# Patient Record
Sex: FEMALE | Race: WHITE | NOT HISPANIC OR LATINO | Employment: OTHER | URBAN - METROPOLITAN AREA
[De-identification: names, ages, dates, MRNs, and addresses within clinical notes are randomized per-mention and may not be internally consistent; named-entity substitution may affect disease eponyms.]

---

## 2019-03-14 ENCOUNTER — RECORDS - HEALTHEAST (OUTPATIENT)
Dept: LAB | Facility: CLINIC | Age: 84
End: 2019-03-14

## 2019-03-14 LAB
ALBUMIN UR-MCNC: ABNORMAL MG/DL
APPEARANCE UR: ABNORMAL
BACTERIA #/AREA URNS HPF: ABNORMAL HPF
BILIRUB UR QL STRIP: NEGATIVE
COLOR UR AUTO: ABNORMAL
GLUCOSE UR STRIP-MCNC: NEGATIVE MG/DL
HGB UR QL STRIP: ABNORMAL
KETONES UR STRIP-MCNC: NEGATIVE MG/DL
LEUKOCYTE ESTERASE UR QL STRIP: ABNORMAL
NITRATE UR QL: NEGATIVE
PH UR STRIP: 5.5 [PH] (ref 4.5–8)
RBC #/AREA URNS AUTO: >100 HPF
SP GR UR STRIP: 1.03 (ref 1–1.03)
SQUAMOUS #/AREA URNS AUTO: ABNORMAL LPF
TRANS CELLS #/AREA URNS HPF: ABNORMAL LPF
UROBILINOGEN UR STRIP-ACNC: ABNORMAL
WBC #/AREA URNS AUTO: >100 HPF
WBC CLUMPS #/AREA URNS HPF: PRESENT /[HPF]

## 2019-03-15 LAB — BACTERIA SPEC CULT: NO GROWTH

## 2019-10-03 ENCOUNTER — RECORDS - HEALTHEAST (OUTPATIENT)
Dept: LAB | Facility: CLINIC | Age: 84
End: 2019-10-03

## 2019-10-03 LAB
ANION GAP SERPL CALCULATED.3IONS-SCNC: 9 MMOL/L (ref 5–18)
BUN SERPL-MCNC: 17 MG/DL (ref 8–28)
CALCIUM SERPL-MCNC: 10.3 MG/DL (ref 8.5–10.5)
CHLORIDE BLD-SCNC: 101 MMOL/L (ref 98–107)
CO2 SERPL-SCNC: 25 MMOL/L (ref 22–31)
CREAT SERPL-MCNC: 0.97 MG/DL (ref 0.6–1.1)
ERYTHROCYTE [DISTWIDTH] IN BLOOD BY AUTOMATED COUNT: 16.2 % (ref 11–14.5)
GFR SERPL CREATININE-BSD FRML MDRD: 55 ML/MIN/1.73M2
GLUCOSE BLD-MCNC: 86 MG/DL (ref 70–125)
HCT VFR BLD AUTO: 39.3 % (ref 35–47)
HGB BLD-MCNC: 12.3 G/DL (ref 12–16)
MCH RBC QN AUTO: 27.2 PG (ref 27–34)
MCHC RBC AUTO-ENTMCNC: 31.3 G/DL (ref 32–36)
MCV RBC AUTO: 87 FL (ref 80–100)
PLATELET # BLD AUTO: 219 THOU/UL (ref 140–440)
PMV BLD AUTO: 11.7 FL (ref 8.5–12.5)
POTASSIUM BLD-SCNC: 4.6 MMOL/L (ref 3.5–5)
RBC # BLD AUTO: 4.53 MILL/UL (ref 3.8–5.4)
SODIUM SERPL-SCNC: 135 MMOL/L (ref 136–145)
WBC: 8.9 THOU/UL (ref 4–11)

## 2019-11-26 ENCOUNTER — RECORDS - HEALTHEAST (OUTPATIENT)
Dept: LAB | Facility: CLINIC | Age: 84
End: 2019-11-26

## 2019-11-26 LAB
ALBUMIN UR-MCNC: ABNORMAL MG/DL
AMORPH CRY #/AREA URNS HPF: ABNORMAL /[HPF]
APPEARANCE UR: ABNORMAL
BACTERIA #/AREA URNS HPF: ABNORMAL HPF
BILIRUB UR QL STRIP: NEGATIVE
COLOR UR AUTO: YELLOW
GLUCOSE UR STRIP-MCNC: NEGATIVE MG/DL
HGB UR QL STRIP: ABNORMAL
KETONES UR STRIP-MCNC: NEGATIVE MG/DL
LEUKOCYTE ESTERASE UR QL STRIP: ABNORMAL
NITRATE UR QL: NEGATIVE
PH UR STRIP: 6 [PH] (ref 4.5–8)
RBC #/AREA URNS AUTO: ABNORMAL HPF
SP GR UR STRIP: 1.01 (ref 1–1.03)
SQUAMOUS #/AREA URNS AUTO: ABNORMAL LPF
UROBILINOGEN UR STRIP-ACNC: ABNORMAL
WBC #/AREA URNS AUTO: >100 HPF
WBC CLUMPS #/AREA URNS HPF: PRESENT /[HPF]

## 2019-11-28 LAB — BACTERIA SPEC CULT: ABNORMAL

## 2020-12-23 ENCOUNTER — RECORDS - HEALTHEAST (OUTPATIENT)
Dept: LAB | Facility: CLINIC | Age: 85
End: 2020-12-23

## 2020-12-23 LAB
SARS-COV-2 PCR COMMENT: NORMAL
SARS-COV-2 RNA SPEC QL NAA+PROBE: NEGATIVE
SARS-COV-2 VIRUS SPECIMEN SOURCE: NORMAL

## 2021-09-03 ENCOUNTER — LAB REQUISITION (OUTPATIENT)
Dept: LAB | Facility: CLINIC | Age: 86
End: 2021-09-03
Payer: MEDICARE

## 2021-09-03 DIAGNOSIS — R76.12 NONSPECIFIC REACTION TO CELL MEDIATED IMMUNITY MEASUREMENT OF GAMMA INTERFERON ANTIGEN RESPONSE WITHOUT ACTIVE TUBERCULOSIS: ICD-10-CM

## 2021-09-04 PROCEDURE — P9603 ONE-WAY ALLOW PRORATED MILES: HCPCS | Mod: ORL | Performed by: NURSE PRACTITIONER

## 2021-09-07 ENCOUNTER — LAB REQUISITION (OUTPATIENT)
Dept: LAB | Facility: CLINIC | Age: 86
End: 2021-09-07

## 2021-09-07 DIAGNOSIS — R76.12 NONSPECIFIC REACTION TO CELL MEDIATED IMMUNITY MEASUREMENT OF GAMMA INTERFERON ANTIGEN RESPONSE WITHOUT ACTIVE TUBERCULOSIS: ICD-10-CM

## 2021-09-08 ENCOUNTER — LAB REQUISITION (OUTPATIENT)
Dept: LAB | Facility: CLINIC | Age: 86
End: 2021-09-08
Payer: COMMERCIAL

## 2021-09-08 DIAGNOSIS — N18.9 CHRONIC KIDNEY DISEASE, UNSPECIFIED: ICD-10-CM

## 2021-09-08 DIAGNOSIS — Z51.81 ENCOUNTER FOR THERAPEUTIC DRUG LEVEL MONITORING: ICD-10-CM

## 2021-09-08 PROCEDURE — 86481 TB AG RESPONSE T-CELL SUSP: CPT | Mod: ORL | Performed by: NURSE PRACTITIONER

## 2021-09-08 PROCEDURE — 36415 COLL VENOUS BLD VENIPUNCTURE: CPT | Performed by: NURSE PRACTITIONER

## 2021-09-08 PROCEDURE — P9604 ONE-WAY ALLOW PRORATED TRIP: HCPCS | Mod: ORL | Performed by: NURSE PRACTITIONER

## 2021-09-09 LAB
ALBUMIN SERPL-MCNC: 2.4 G/DL (ref 3.5–5)
ALP SERPL-CCNC: 87 U/L (ref 45–120)
ALT SERPL W P-5'-P-CCNC: 21 U/L (ref 0–45)
ANION GAP SERPL CALCULATED.3IONS-SCNC: 10 MMOL/L (ref 5–18)
AST SERPL W P-5'-P-CCNC: 22 U/L (ref 0–40)
BILIRUB DIRECT SERPL-MCNC: <0.1 MG/DL
BILIRUB SERPL-MCNC: 0.3 MG/DL (ref 0–1)
BUN SERPL-MCNC: 22 MG/DL (ref 8–28)
CALCIUM SERPL-MCNC: 9.7 MG/DL (ref 8.5–10.5)
CHLORIDE BLD-SCNC: 99 MMOL/L (ref 98–107)
CO2 SERPL-SCNC: 25 MMOL/L (ref 22–31)
CREAT SERPL-MCNC: 0.84 MG/DL (ref 0.6–1.1)
ERYTHROCYTE [DISTWIDTH] IN BLOOD BY AUTOMATED COUNT: 15.1 % (ref 10–15)
GAMMA INTERFERON BACKGROUND BLD IA-ACNC: 0.07 IU/ML
GFR SERPL CREATININE-BSD FRML MDRD: 63 ML/MIN/1.73M2
GLUCOSE BLD-MCNC: 84 MG/DL (ref 70–125)
HCT VFR BLD AUTO: 32.7 % (ref 35–47)
HGB BLD-MCNC: 10.8 G/DL (ref 11.7–15.7)
M TB IFN-G BLD-IMP: NEGATIVE
M TB IFN-G CD4+ BCKGRND COR BLD-ACNC: 1.85 IU/ML
MCH RBC QN AUTO: 28.7 PG (ref 26.5–33)
MCHC RBC AUTO-ENTMCNC: 33 G/DL (ref 31.5–36.5)
MCV RBC AUTO: 87 FL (ref 78–100)
MITOGEN IGNF BCKGRD COR BLD-ACNC: -0.01 IU/ML
MITOGEN IGNF BCKGRD COR BLD-ACNC: 0.04 IU/ML
PLATELET # BLD AUTO: 245 10E3/UL (ref 150–450)
POTASSIUM BLD-SCNC: 4.4 MMOL/L (ref 3.5–5)
PROT SERPL-MCNC: 6.1 G/DL (ref 6–8)
QUANTIFERON MITOGEN: 1.92 IU/ML
QUANTIFERON NIL TUBE: 0.07 IU/ML
QUANTIFERON TB1 TUBE: 0.11 IU/ML
QUANTIFERON TB2 TUBE: 0.06
RBC # BLD AUTO: 3.76 10E6/UL (ref 3.8–5.2)
SODIUM SERPL-SCNC: 134 MMOL/L (ref 136–145)
WBC # BLD AUTO: 9.5 10E3/UL (ref 4–11)

## 2021-09-09 PROCEDURE — P9603 ONE-WAY ALLOW PRORATED MILES: HCPCS | Performed by: NURSE PRACTITIONER

## 2021-09-09 PROCEDURE — 80053 COMPREHEN METABOLIC PANEL: CPT | Performed by: NURSE PRACTITIONER

## 2021-09-09 PROCEDURE — 85027 COMPLETE CBC AUTOMATED: CPT | Mod: ORL | Performed by: NURSE PRACTITIONER

## 2021-09-09 PROCEDURE — 36415 COLL VENOUS BLD VENIPUNCTURE: CPT | Mod: ORL | Performed by: NURSE PRACTITIONER

## 2021-09-09 PROCEDURE — 80053 COMPREHEN METABOLIC PANEL: CPT | Mod: ORL | Performed by: NURSE PRACTITIONER

## 2021-10-09 ENCOUNTER — LAB REQUISITION (OUTPATIENT)
Dept: LAB | Facility: CLINIC | Age: 86
End: 2021-10-09
Payer: MEDICARE

## 2021-10-09 DIAGNOSIS — D64.9 ANEMIA, UNSPECIFIED: ICD-10-CM

## 2021-10-09 DIAGNOSIS — N18.9 CHRONIC KIDNEY DISEASE, UNSPECIFIED: ICD-10-CM

## 2021-10-11 LAB
ANION GAP SERPL CALCULATED.3IONS-SCNC: 10 MMOL/L (ref 5–18)
BUN SERPL-MCNC: 23 MG/DL (ref 8–28)
CALCIUM SERPL-MCNC: 9.9 MG/DL (ref 8.5–10.5)
CHLORIDE BLD-SCNC: 95 MMOL/L (ref 98–107)
CO2 SERPL-SCNC: 23 MMOL/L (ref 22–31)
CREAT SERPL-MCNC: 0.89 MG/DL (ref 0.6–1.1)
ERYTHROCYTE [DISTWIDTH] IN BLOOD BY AUTOMATED COUNT: 16 % (ref 10–15)
GFR SERPL CREATININE-BSD FRML MDRD: 58 ML/MIN/1.73M2
GLUCOSE BLD-MCNC: 95 MG/DL (ref 70–125)
HCT VFR BLD AUTO: 33.9 % (ref 35–47)
HGB BLD-MCNC: 11.5 G/DL (ref 11.7–15.7)
MCH RBC QN AUTO: 30.3 PG (ref 26.5–33)
MCHC RBC AUTO-ENTMCNC: 33.9 G/DL (ref 31.5–36.5)
MCV RBC AUTO: 89 FL (ref 78–100)
PLATELET # BLD AUTO: 244 10E3/UL (ref 150–450)
POTASSIUM BLD-SCNC: 4.4 MMOL/L (ref 3.5–5)
RBC # BLD AUTO: 3.8 10E6/UL (ref 3.8–5.2)
SODIUM SERPL-SCNC: 128 MMOL/L (ref 136–145)
WBC # BLD AUTO: 7.2 10E3/UL (ref 4–11)

## 2021-10-11 PROCEDURE — 80048 BASIC METABOLIC PNL TOTAL CA: CPT | Mod: ORL | Performed by: NURSE PRACTITIONER

## 2021-10-11 PROCEDURE — 85027 COMPLETE CBC AUTOMATED: CPT | Mod: ORL | Performed by: NURSE PRACTITIONER

## 2021-10-11 PROCEDURE — 36415 COLL VENOUS BLD VENIPUNCTURE: CPT | Performed by: NURSE PRACTITIONER

## 2021-11-02 ENCOUNTER — LAB REQUISITION (OUTPATIENT)
Dept: LAB | Facility: CLINIC | Age: 86
End: 2021-11-02
Payer: MEDICARE

## 2021-11-02 DIAGNOSIS — E55.9 VITAMIN D DEFICIENCY, UNSPECIFIED: ICD-10-CM

## 2021-11-02 DIAGNOSIS — I12.9 HYPERTENSIVE CHRONIC KIDNEY DISEASE WITH STAGE 1 THROUGH STAGE 4 CHRONIC KIDNEY DISEASE, OR UNSPECIFIED CHRONIC KIDNEY DISEASE: ICD-10-CM

## 2021-11-02 DIAGNOSIS — E53.8 DEFICIENCY OF OTHER SPECIFIED B GROUP VITAMINS: ICD-10-CM

## 2021-11-02 DIAGNOSIS — I48.0 PAROXYSMAL ATRIAL FIBRILLATION (H): ICD-10-CM

## 2021-11-02 DIAGNOSIS — E78.5 HYPERLIPIDEMIA, UNSPECIFIED: ICD-10-CM

## 2021-11-03 PROCEDURE — 36415 COLL VENOUS BLD VENIPUNCTURE: CPT | Performed by: INTERNAL MEDICINE

## 2021-11-03 PROCEDURE — 82607 VITAMIN B-12: CPT | Performed by: INTERNAL MEDICINE

## 2021-11-03 PROCEDURE — 84443 ASSAY THYROID STIM HORMONE: CPT | Performed by: INTERNAL MEDICINE

## 2021-11-03 PROCEDURE — P9604 ONE-WAY ALLOW PRORATED TRIP: HCPCS | Performed by: INTERNAL MEDICINE

## 2021-11-03 PROCEDURE — 84443 ASSAY THYROID STIM HORMONE: CPT | Mod: ORL | Performed by: INTERNAL MEDICINE

## 2021-11-03 PROCEDURE — 85025 COMPLETE CBC W/AUTO DIFF WBC: CPT | Mod: ORL | Performed by: INTERNAL MEDICINE

## 2021-11-03 PROCEDURE — P9604 ONE-WAY ALLOW PRORATED TRIP: HCPCS | Mod: ORL | Performed by: INTERNAL MEDICINE

## 2021-11-03 PROCEDURE — 82306 VITAMIN D 25 HYDROXY: CPT | Performed by: INTERNAL MEDICINE

## 2021-11-03 PROCEDURE — 80061 LIPID PANEL: CPT | Mod: ORL | Performed by: INTERNAL MEDICINE

## 2021-11-03 PROCEDURE — 85025 COMPLETE CBC W/AUTO DIFF WBC: CPT | Performed by: INTERNAL MEDICINE

## 2021-11-03 PROCEDURE — 82607 VITAMIN B-12: CPT | Mod: ORL | Performed by: INTERNAL MEDICINE

## 2021-11-03 PROCEDURE — 80053 COMPREHEN METABOLIC PANEL: CPT | Performed by: INTERNAL MEDICINE

## 2021-11-03 PROCEDURE — 80053 COMPREHEN METABOLIC PANEL: CPT | Mod: ORL | Performed by: INTERNAL MEDICINE

## 2021-11-03 PROCEDURE — 80061 LIPID PANEL: CPT | Performed by: INTERNAL MEDICINE

## 2021-11-10 LAB
ALBUMIN SERPL-MCNC: 2.9 G/DL (ref 3.5–5)
ALP SERPL-CCNC: 86 U/L (ref 45–120)
ALT SERPL W P-5'-P-CCNC: 17 U/L (ref 0–45)
ANION GAP SERPL CALCULATED.3IONS-SCNC: 12 MMOL/L (ref 5–18)
AST SERPL W P-5'-P-CCNC: 22 U/L (ref 0–40)
BASOPHILS # BLD AUTO: 0 10E3/UL (ref 0–0.2)
BASOPHILS NFR BLD AUTO: 1 %
BILIRUB SERPL-MCNC: 0.3 MG/DL (ref 0–1)
BUN SERPL-MCNC: 14 MG/DL (ref 8–28)
CALCIUM SERPL-MCNC: 9.5 MG/DL (ref 8.5–10.5)
CHLORIDE BLD-SCNC: 106 MMOL/L (ref 98–107)
CHOLEST SERPL-MCNC: 205 MG/DL
CO2 SERPL-SCNC: 19 MMOL/L (ref 22–31)
CREAT SERPL-MCNC: 0.99 MG/DL (ref 0.6–1.1)
EOSINOPHIL # BLD AUTO: 0.3 10E3/UL (ref 0–0.7)
EOSINOPHIL NFR BLD AUTO: 4 %
ERYTHROCYTE [DISTWIDTH] IN BLOOD BY AUTOMATED COUNT: 15.2 % (ref 10–15)
FASTING STATUS PATIENT QL REPORTED: ABNORMAL
GFR SERPL CREATININE-BSD FRML MDRD: 51 ML/MIN/1.73M2
GLUCOSE BLD-MCNC: 68 MG/DL (ref 70–125)
HCT VFR BLD AUTO: 38 % (ref 35–47)
HDLC SERPL-MCNC: 96 MG/DL
HGB BLD-MCNC: 11.8 G/DL (ref 11.7–15.7)
IMM GRANULOCYTES # BLD: 0 10E3/UL
IMM GRANULOCYTES NFR BLD: 0 %
LDLC SERPL CALC-MCNC: 91 MG/DL
LYMPHOCYTES # BLD AUTO: 1 10E3/UL (ref 0.8–5.3)
LYMPHOCYTES NFR BLD AUTO: 16 %
MCH RBC QN AUTO: 28.6 PG (ref 26.5–33)
MCHC RBC AUTO-ENTMCNC: 31.1 G/DL (ref 31.5–36.5)
MCV RBC AUTO: 92 FL (ref 78–100)
MONOCYTES # BLD AUTO: 0.8 10E3/UL (ref 0–1.3)
MONOCYTES NFR BLD AUTO: 13 %
NEUTROPHILS # BLD AUTO: 4 10E3/UL (ref 1.6–8.3)
NEUTROPHILS NFR BLD AUTO: 66 %
NRBC # BLD AUTO: 0 10E3/UL
NRBC BLD AUTO-RTO: 0 /100
PLATELET # BLD AUTO: 229 10E3/UL (ref 150–450)
POTASSIUM BLD-SCNC: 4.5 MMOL/L (ref 3.5–5)
PROT SERPL-MCNC: 6.5 G/DL (ref 6–8)
RBC # BLD AUTO: 4.13 10E6/UL (ref 3.8–5.2)
SODIUM SERPL-SCNC: 137 MMOL/L (ref 136–145)
TRIGL SERPL-MCNC: 90 MG/DL
TSH SERPL DL<=0.005 MIU/L-ACNC: 1.98 UIU/ML (ref 0.3–5)
VIT B12 SERPL-MCNC: 1395 PG/ML (ref 213–816)
WBC # BLD AUTO: 6.1 10E3/UL (ref 4–11)

## 2021-11-11 LAB — DEPRECATED CALCIDIOL+CALCIFEROL SERPL-MC: 31 UG/L (ref 30–80)

## 2021-12-27 ENCOUNTER — LAB REQUISITION (OUTPATIENT)
Dept: LAB | Facility: CLINIC | Age: 86
End: 2021-12-27
Payer: MEDICARE

## 2021-12-27 DIAGNOSIS — I10 ESSENTIAL (PRIMARY) HYPERTENSION: ICD-10-CM

## 2021-12-29 LAB
ANION GAP SERPL CALCULATED.3IONS-SCNC: 9 MMOL/L (ref 5–18)
BUN SERPL-MCNC: 12 MG/DL (ref 8–28)
CALCIUM SERPL-MCNC: 10.2 MG/DL (ref 8.5–10.5)
CHLORIDE BLD-SCNC: 104 MMOL/L (ref 98–107)
CO2 SERPL-SCNC: 27 MMOL/L (ref 22–31)
CREAT SERPL-MCNC: 1.02 MG/DL (ref 0.6–1.1)
GFR SERPL CREATININE-BSD FRML MDRD: 53 ML/MIN/1.73M2
GLUCOSE BLD-MCNC: 103 MG/DL (ref 70–125)
POTASSIUM BLD-SCNC: 4.2 MMOL/L (ref 3.5–5)
SODIUM SERPL-SCNC: 140 MMOL/L (ref 136–145)

## 2021-12-29 PROCEDURE — 36415 COLL VENOUS BLD VENIPUNCTURE: CPT | Mod: ORL | Performed by: INTERNAL MEDICINE

## 2021-12-29 PROCEDURE — P9603 ONE-WAY ALLOW PRORATED MILES: HCPCS | Mod: ORL | Performed by: INTERNAL MEDICINE

## 2021-12-29 PROCEDURE — 80048 BASIC METABOLIC PNL TOTAL CA: CPT | Mod: ORL | Performed by: INTERNAL MEDICINE

## 2022-01-06 ENCOUNTER — LAB REQUISITION (OUTPATIENT)
Dept: LAB | Facility: CLINIC | Age: 87
End: 2022-01-06
Payer: COMMERCIAL

## 2022-01-06 DIAGNOSIS — R30.0 DYSURIA: ICD-10-CM

## 2022-01-06 LAB
ALBUMIN UR-MCNC: 20 MG/DL
APPEARANCE UR: ABNORMAL
BACTERIA #/AREA URNS HPF: ABNORMAL /HPF
BILIRUB UR QL STRIP: NEGATIVE
COLOR UR AUTO: ABNORMAL
GLUCOSE UR STRIP-MCNC: NEGATIVE MG/DL
HGB UR QL STRIP: ABNORMAL
KETONES UR STRIP-MCNC: NEGATIVE MG/DL
LEUKOCYTE ESTERASE UR QL STRIP: ABNORMAL
NITRATE UR QL: POSITIVE
PH UR STRIP: 6 [PH] (ref 5–7)
RBC URINE: 4 /HPF
SP GR UR STRIP: 1.01 (ref 1–1.03)
SQUAMOUS EPITHELIAL: 1 /HPF
UROBILINOGEN UR STRIP-MCNC: NORMAL MG/DL
WBC CLUMPS #/AREA URNS HPF: PRESENT /HPF
WBC URINE: >182 /HPF

## 2022-01-06 PROCEDURE — 87086 URINE CULTURE/COLONY COUNT: CPT | Mod: ORL | Performed by: INTERNAL MEDICINE

## 2022-01-06 PROCEDURE — 81001 URINALYSIS AUTO W/SCOPE: CPT | Mod: ORL | Performed by: INTERNAL MEDICINE

## 2022-01-07 LAB — BACTERIA UR CULT: NORMAL

## 2022-01-10 ENCOUNTER — LAB REQUISITION (OUTPATIENT)
Dept: LAB | Facility: CLINIC | Age: 87
End: 2022-01-10
Payer: COMMERCIAL

## 2022-01-10 DIAGNOSIS — R30.0 DYSURIA: ICD-10-CM

## 2022-01-10 LAB
ALBUMIN UR-MCNC: 100 MG/DL
APPEARANCE UR: ABNORMAL
BACTERIA #/AREA URNS HPF: ABNORMAL /HPF
BILIRUB UR QL STRIP: NEGATIVE
COLOR UR AUTO: YELLOW
GLUCOSE UR STRIP-MCNC: NEGATIVE MG/DL
HGB UR QL STRIP: ABNORMAL
KETONES UR STRIP-MCNC: NEGATIVE MG/DL
LEUKOCYTE ESTERASE UR QL STRIP: ABNORMAL
NITRATE UR QL: NEGATIVE
PH UR STRIP: 6.5 [PH] (ref 5–7)
RBC URINE: 45 /HPF
SP GR UR STRIP: 1.02 (ref 1–1.03)
UROBILINOGEN UR STRIP-MCNC: <2 MG/DL
WBC CLUMPS #/AREA URNS HPF: PRESENT /HPF
WBC URINE: >182 /HPF

## 2022-01-10 PROCEDURE — 81001 URINALYSIS AUTO W/SCOPE: CPT | Mod: ORL | Performed by: INTERNAL MEDICINE

## 2022-01-10 PROCEDURE — 87086 URINE CULTURE/COLONY COUNT: CPT | Mod: ORL | Performed by: INTERNAL MEDICINE

## 2022-01-12 LAB — BACTERIA UR CULT: NORMAL

## 2022-01-14 ENCOUNTER — LAB REQUISITION (OUTPATIENT)
Dept: LAB | Facility: CLINIC | Age: 87
End: 2022-01-14
Payer: COMMERCIAL

## 2022-01-14 DIAGNOSIS — Z03.818 ENCOUNTER FOR OBSERVATION FOR SUSPECTED EXPOSURE TO OTHER BIOLOGICAL AGENTS RULED OUT: ICD-10-CM

## 2022-01-17 PROCEDURE — U0003 INFECTIOUS AGENT DETECTION BY NUCLEIC ACID (DNA OR RNA); SEVERE ACUTE RESPIRATORY SYNDROME CORONAVIRUS 2 (SARS-COV-2) (CORONAVIRUS DISEASE [COVID-19]), AMPLIFIED PROBE TECHNIQUE, MAKING USE OF HIGH THROUGHPUT TECHNOLOGIES AS DESCRIBED BY CMS-2020-01-R: HCPCS | Mod: ORL | Performed by: INTERNAL MEDICINE

## 2022-01-18 ENCOUNTER — TRANSCRIBE ORDERS (OUTPATIENT)
Dept: OTHER | Age: 87
End: 2022-01-18
Payer: COMMERCIAL

## 2022-01-18 DIAGNOSIS — N32.81 OVERACTIVE BLADDER: Primary | ICD-10-CM

## 2022-01-19 LAB — SARS-COV-2 RNA RESP QL NAA+PROBE: NEGATIVE

## 2022-01-20 ENCOUNTER — LAB REQUISITION (OUTPATIENT)
Dept: LAB | Facility: CLINIC | Age: 87
End: 2022-01-20
Payer: COMMERCIAL

## 2022-01-20 DIAGNOSIS — Z03.818 ENCOUNTER FOR OBSERVATION FOR SUSPECTED EXPOSURE TO OTHER BIOLOGICAL AGENTS RULED OUT: ICD-10-CM

## 2022-01-24 PROCEDURE — U0005 INFEC AGEN DETEC AMPLI PROBE: HCPCS | Mod: ORL | Performed by: INTERNAL MEDICINE

## 2022-01-25 ENCOUNTER — LAB REQUISITION (OUTPATIENT)
Dept: LAB | Facility: CLINIC | Age: 87
End: 2022-01-25
Payer: COMMERCIAL

## 2022-01-25 DIAGNOSIS — Z03.818 ENCOUNTER FOR OBSERVATION FOR SUSPECTED EXPOSURE TO OTHER BIOLOGICAL AGENTS RULED OUT: ICD-10-CM

## 2022-01-25 LAB — SARS-COV-2 RNA RESP QL NAA+PROBE: NOT DETECTED

## 2022-01-27 ENCOUNTER — LAB REQUISITION (OUTPATIENT)
Dept: LAB | Facility: CLINIC | Age: 87
End: 2022-01-27
Payer: COMMERCIAL

## 2022-01-27 DIAGNOSIS — Z03.818 ENCOUNTER FOR OBSERVATION FOR SUSPECTED EXPOSURE TO OTHER BIOLOGICAL AGENTS RULED OUT: ICD-10-CM

## 2022-01-27 PROCEDURE — U0003 INFECTIOUS AGENT DETECTION BY NUCLEIC ACID (DNA OR RNA); SEVERE ACUTE RESPIRATORY SYNDROME CORONAVIRUS 2 (SARS-COV-2) (CORONAVIRUS DISEASE [COVID-19]), AMPLIFIED PROBE TECHNIQUE, MAKING USE OF HIGH THROUGHPUT TECHNOLOGIES AS DESCRIBED BY CMS-2020-01-R: HCPCS | Mod: ORL

## 2022-01-28 LAB — SARS-COV-2 RNA RESP QL NAA+PROBE: NOT DETECTED

## 2022-01-31 PROCEDURE — U0005 INFEC AGEN DETEC AMPLI PROBE: HCPCS | Mod: ORL

## 2022-02-01 LAB — SARS-COV-2 RNA RESP QL NAA+PROBE: NORMAL

## 2022-02-02 ENCOUNTER — LAB REQUISITION (OUTPATIENT)
Dept: LAB | Facility: CLINIC | Age: 87
End: 2022-02-02
Payer: COMMERCIAL

## 2022-02-02 DIAGNOSIS — Z03.818 ENCOUNTER FOR OBSERVATION FOR SUSPECTED EXPOSURE TO OTHER BIOLOGICAL AGENTS RULED OUT: ICD-10-CM

## 2022-02-02 LAB — SARS-COV-2 RNA RESP QL NAA+PROBE: NOT DETECTED

## 2022-02-03 PROCEDURE — U0003 INFECTIOUS AGENT DETECTION BY NUCLEIC ACID (DNA OR RNA); SEVERE ACUTE RESPIRATORY SYNDROME CORONAVIRUS 2 (SARS-COV-2) (CORONAVIRUS DISEASE [COVID-19]), AMPLIFIED PROBE TECHNIQUE, MAKING USE OF HIGH THROUGHPUT TECHNOLOGIES AS DESCRIBED BY CMS-2020-01-R: HCPCS | Mod: ORL

## 2022-02-06 LAB — SARS-COV-2 RNA RESP QL NAA+PROBE: NEGATIVE

## 2022-03-21 ENCOUNTER — LAB REQUISITION (OUTPATIENT)
Dept: LAB | Facility: CLINIC | Age: 87
End: 2022-03-21
Payer: COMMERCIAL

## 2022-03-21 ENCOUNTER — LAB REQUISITION (OUTPATIENT)
Dept: LAB | Facility: CLINIC | Age: 87
End: 2022-03-21

## 2022-03-21 DIAGNOSIS — R30.0 DYSURIA: ICD-10-CM

## 2022-03-21 LAB
ALBUMIN UR-MCNC: 20 MG/DL
APPEARANCE UR: ABNORMAL
BACTERIA #/AREA URNS HPF: ABNORMAL /HPF
BILIRUB UR QL STRIP: NEGATIVE
COLOR UR AUTO: ABNORMAL
GLUCOSE UR STRIP-MCNC: NEGATIVE MG/DL
HGB UR QL STRIP: ABNORMAL
KETONES UR STRIP-MCNC: NEGATIVE MG/DL
LEUKOCYTE ESTERASE UR QL STRIP: ABNORMAL
MUCOUS THREADS #/AREA URNS LPF: PRESENT /LPF
NITRATE UR QL: POSITIVE
PH UR STRIP: 7 [PH] (ref 5–7)
RBC URINE: 21 /HPF
SP GR UR STRIP: 1.01 (ref 1–1.03)
SQUAMOUS EPITHELIAL: 1 /HPF
UROBILINOGEN UR STRIP-MCNC: <2 MG/DL
WBC CLUMPS #/AREA URNS HPF: PRESENT /HPF
WBC URINE: >182 /HPF

## 2022-03-21 PROCEDURE — 87088 URINE BACTERIA CULTURE: CPT | Mod: ORL | Performed by: INTERNAL MEDICINE

## 2022-03-21 PROCEDURE — 81001 URINALYSIS AUTO W/SCOPE: CPT | Mod: ORL | Performed by: INTERNAL MEDICINE

## 2022-03-23 LAB — BACTERIA UR CULT: ABNORMAL

## 2022-04-22 ENCOUNTER — APPOINTMENT (OUTPATIENT)
Dept: CT IMAGING | Facility: HOSPITAL | Age: 87
End: 2022-04-22
Attending: STUDENT IN AN ORGANIZED HEALTH CARE EDUCATION/TRAINING PROGRAM
Payer: COMMERCIAL

## 2022-04-22 ENCOUNTER — APPOINTMENT (OUTPATIENT)
Dept: RADIOLOGY | Facility: HOSPITAL | Age: 87
End: 2022-04-22
Attending: STUDENT IN AN ORGANIZED HEALTH CARE EDUCATION/TRAINING PROGRAM
Payer: COMMERCIAL

## 2022-04-22 ENCOUNTER — HOSPITAL ENCOUNTER (EMERGENCY)
Facility: HOSPITAL | Age: 87
Discharge: HOME | End: 2022-04-22
Attending: STUDENT IN AN ORGANIZED HEALTH CARE EDUCATION/TRAINING PROGRAM | Admitting: STUDENT IN AN ORGANIZED HEALTH CARE EDUCATION/TRAINING PROGRAM
Payer: COMMERCIAL

## 2022-04-22 VITALS
OXYGEN SATURATION: 90 % | DIASTOLIC BLOOD PRESSURE: 75 MMHG | TEMPERATURE: 98.6 F | RESPIRATION RATE: 30 BRPM | HEART RATE: 97 BPM | SYSTOLIC BLOOD PRESSURE: 174 MMHG

## 2022-04-22 DIAGNOSIS — U07.1 INFECTION DUE TO 2019 NOVEL CORONAVIRUS: ICD-10-CM

## 2022-04-22 DIAGNOSIS — R06.02 SHORTNESS OF BREATH: ICD-10-CM

## 2022-04-22 LAB
ANION GAP SERPL CALCULATED.3IONS-SCNC: 12 MMOL/L (ref 5–18)
BUN SERPL-MCNC: 23 MG/DL (ref 8–28)
CALCIUM SERPL-MCNC: 9.3 MG/DL (ref 8.5–10.5)
CHLORIDE BLD-SCNC: 103 MMOL/L (ref 98–107)
CO2 SERPL-SCNC: 23 MMOL/L (ref 22–31)
CREAT SERPL-MCNC: 1.17 MG/DL (ref 0.6–1.1)
D DIMER PPP FEU-MCNC: 2.36 UG/ML FEU (ref 0–0.5)
ERYTHROCYTE [DISTWIDTH] IN BLOOD BY AUTOMATED COUNT: 15.6 % (ref 10–15)
GFR SERPL CREATININE-BSD FRML MDRD: 45 ML/MIN/1.73M2
GLUCOSE BLD-MCNC: 85 MG/DL (ref 70–125)
HCT VFR BLD AUTO: 34.2 % (ref 35–47)
HGB BLD-MCNC: 11 G/DL (ref 11.7–15.7)
HOLD SPECIMEN: NORMAL
MCH RBC QN AUTO: 27.6 PG (ref 26.5–33)
MCHC RBC AUTO-ENTMCNC: 32.2 G/DL (ref 31.5–36.5)
MCV RBC AUTO: 86 FL (ref 78–100)
PLATELET # BLD AUTO: 326 10E3/UL (ref 150–450)
POTASSIUM BLD-SCNC: 4.5 MMOL/L (ref 3.5–5)
RBC # BLD AUTO: 3.99 10E6/UL (ref 3.8–5.2)
SODIUM SERPL-SCNC: 138 MMOL/L (ref 136–145)
TROPONIN I SERPL-MCNC: 0.01 NG/ML (ref 0–0.29)
WBC # BLD AUTO: 7.7 10E3/UL (ref 4–11)

## 2022-04-22 PROCEDURE — 85379 FIBRIN DEGRADATION QUANT: CPT | Performed by: STUDENT IN AN ORGANIZED HEALTH CARE EDUCATION/TRAINING PROGRAM

## 2022-04-22 PROCEDURE — 36415 COLL VENOUS BLD VENIPUNCTURE: CPT | Performed by: STUDENT IN AN ORGANIZED HEALTH CARE EDUCATION/TRAINING PROGRAM

## 2022-04-22 PROCEDURE — 250N000011 HC RX IP 250 OP 636: Performed by: STUDENT IN AN ORGANIZED HEALTH CARE EDUCATION/TRAINING PROGRAM

## 2022-04-22 PROCEDURE — 71045 X-RAY EXAM CHEST 1 VIEW: CPT

## 2022-04-22 PROCEDURE — 93005 ELECTROCARDIOGRAM TRACING: CPT | Performed by: STUDENT IN AN ORGANIZED HEALTH CARE EDUCATION/TRAINING PROGRAM

## 2022-04-22 PROCEDURE — 99285 EMERGENCY DEPT VISIT HI MDM: CPT | Mod: 25

## 2022-04-22 PROCEDURE — 85014 HEMATOCRIT: CPT | Performed by: STUDENT IN AN ORGANIZED HEALTH CARE EDUCATION/TRAINING PROGRAM

## 2022-04-22 PROCEDURE — 80048 BASIC METABOLIC PNL TOTAL CA: CPT | Performed by: STUDENT IN AN ORGANIZED HEALTH CARE EDUCATION/TRAINING PROGRAM

## 2022-04-22 PROCEDURE — 71275 CT ANGIOGRAPHY CHEST: CPT

## 2022-04-22 PROCEDURE — 84484 ASSAY OF TROPONIN QUANT: CPT | Performed by: STUDENT IN AN ORGANIZED HEALTH CARE EDUCATION/TRAINING PROGRAM

## 2022-04-22 RX ORDER — IOPAMIDOL 755 MG/ML
75 INJECTION, SOLUTION INTRAVASCULAR ONCE
Status: COMPLETED | OUTPATIENT
Start: 2022-04-22 | End: 2022-04-22

## 2022-04-22 RX ORDER — BENZONATATE 100 MG/1
100 CAPSULE ORAL 3 TIMES DAILY PRN
Qty: 15 CAPSULE | Refills: 0 | Status: SHIPPED | OUTPATIENT
Start: 2022-04-22 | End: 2022-04-27

## 2022-04-22 RX ADMIN — IOPAMIDOL 75 ML: 755 INJECTION, SOLUTION INTRAVENOUS at 19:38

## 2022-04-22 ASSESSMENT — ENCOUNTER SYMPTOMS
FREQUENCY: 0
COUGH: 1
VOMITING: 0
FEVER: 0
CONSTIPATION: 1
ABDOMINAL PAIN: 0
DIARRHEA: 0
SHORTNESS OF BREATH: 1
DYSURIA: 0
HEMATURIA: 0

## 2022-04-22 NOTE — ED NOTES
Bed: JNED-23  Expected date: 4/22/22  Expected time: 4:17 PM  Means of arrival: Ambulance  Comments:  Radha 88 yo F COVID+

## 2022-04-22 NOTE — ED TRIAGE NOTES
Patient has increased cough and phlegm production, tested positive for covid on 4/18. Lives in assisted living, staff concerned and requesting chest x ray

## 2022-04-22 NOTE — ED PROVIDER NOTES
NAME: Bessy Sevilla  AGE: 87 year old female  YOB: 1934  MRN: [id number]  EVALUATION DATE & TIME: 2022  4:24 PM    PCP: Clementine Watts    ED PROVIDER: Teena Rodriguez MD.      Chief Complaint   Patient presents with     Shortness of Breath         FINAL IMPRESSION:  1. Infection due to 2019 novel coronavirus    2. Shortness of breath        MEDICAL DECISION MAKIN:55 PM I met with the patient, obtained history, performed an initial exam, and discussed options and plan for diagnostics and treatment here in the ED.   6:01 PM I called lab, there was an issue with labs, but they are now in process.  8:23 PM I updated patient on results.  Pertinent Labs & Imaging studies reviewed. (See chart for details)  ED Course as of 22   1706 87-year-old female with history of atrial fibrillation on Xarelto, COPD, hypertension, among others who presents with cough and shortness of breath.  Patient's been feeling unwell with nonproductive cough for about a week.  On Monday tested positive for COVID.  She was sent here from her assisted living for further evaluation.  She is nontoxic-appearing on exam, she does have frequent coughing episodes and becomes tachycardic during those episodes. Dx includes covid19, PE, ACS, bacterial pneumonia, CHF among others. No wheezing or increased sputum production to suggest COPD exacerbation. Plan for EKG, labs, CXR.     Her EKG did not show any concerning ischemic changes and her troponin is negative.  She has been having no chest pain, do not suspect ACS.  BMP showed mild increase in creat otherwise this and her CBC are near baseline. Did get a D-dimer which was elevated so proceeded with CT PE scan.  Discussed results with radiologist and there is a faint opacification in the peripheral aspect of the pulmonary arteries but overall no PE seen.  Discussed the incidental findings with her including possible left renal cyst for which she  will need outpatient follow-up.  Her imaging and exam do not suggest that she is volume up or has concerning CHF.  High suspicion is her symptoms are related to her known COVID infection. She does have some coughing fits that are distressing for her we will do a short trial of Tessalon Perles.  But overall recommended very close follow-up with her primary care doctor and returning with any worsening symptoms.  She is agreement the plan, endorses understanding her questions were answered.  Of note she has been hypertensive here but her work-up is not suggesting of any endorgan dysfunction and was downtrending during ED course.  She is also due for her evening blood pressure meds which she will take when she goes home.         MEDICATIONS GIVEN IN THE EMERGENCY:  Medications   iopamidol (ISOVUE-370) solution 75 mL (75 mLs Intravenous Given 4/22/22 1938)       NEW PRESCRIPTIONS STARTED AT TODAY'S ER VISIT:  New Prescriptions    BENZONATATE (TESSALON) 100 MG CAPSULE    Take 1 capsule (100 mg) by mouth 3 times daily as needed for cough          =================================================================    HPI    Patient information was obtained from: Patient    Use of : N/A         Bessy Sevilla is a 87 year old female with a past medical history of asthma, hypertension, atrial fibrillation (Xarelto), COPD, and COVID (4/18/22) who presents for evaluation of shortness of breath.     Patient reports she started to feel unwell on 4/12 (~10 days ago). She then tested positive for COVID on 4/18 (~4 days ago). Patient endorses a bad, nonproductive cough. She has mild associative shortness of breath with coughing. No known fevers. No vomiting, diarrhea, abdominal pain. She does mention some constipation and increased urination. The increased urination has been for the past year with no changes. Patient also notes some onset of leg swelling that started last night. Denies chest pain, or additional medical  concerns or complaints at this time.       REVIEW OF SYSTEMS   Review of Systems   Constitutional: Negative for fever.   Respiratory: Positive for cough (nonproductive) and shortness of breath (with cough).    Cardiovascular: Positive for leg swelling (some starting last night). Negative for chest pain.   Gastrointestinal: Positive for constipation. Negative for abdominal pain, diarrhea and vomiting.   Genitourinary: Negative for dysuria, frequency and hematuria.   All other systems reviewed and are negative.       PAST MEDICAL HISTORY:  No past medical history on file.    PAST SURGICAL HISTORY:  Past Surgical History:   Procedure Laterality Date     IR ABSCESS DRAIN INSERTION  7/30/2016     LAPAROSCOPIC CHOLECYSTECTOMY  07/19/2016     LAPAROSCOPIC CHOLECYSTECTOMY N/A 7/18/2016    Procedure: CHOLECYSTECTOMY LAPAROSCOPIC;  Surgeon: Brianna Cast MD;  Location: [facility];  Service:      SPINAL FUSION         CURRENT MEDICATIONS:    No current facility-administered medications for this encounter.    Current Outpatient Medications:      benzonatate (TESSALON) 100 MG capsule, Take 1 capsule (100 mg) by mouth 3 times daily as needed for cough, Disp: 15 capsule, Rfl: 0     albuterol (PROVENTIL HFA;VENTOLIN HFA) 90 mcg/actuation inhaler, [ALBUTEROL (PROVENTIL HFA;VENTOLIN HFA) 90 MCG/ACTUATION INHALER] Inhale 2 puffs 2 (two) times a day., Disp: , Rfl:      amLODIPine (NORVASC) 5 MG tablet, [AMLODIPINE (NORVASC) 5 MG TABLET] Take 5 mg by mouth every morning., Disp: , Rfl:      ascorbic acid, vitamin C, (VITAMIN C) 500 MG tablet, [ASCORBIC ACID, VITAMIN C, (VITAMIN C) 500 MG TABLET] Take by mouth 2 (two) times a day., Disp: , Rfl:      atorvastatin (LIPITOR) 20 MG tablet, [ATORVASTATIN (LIPITOR) 20 MG TABLET] Take 20 mg by mouth daily., Disp: , Rfl:      cetirizine (ZYRTEC) 10 MG tablet, [CETIRIZINE (ZYRTEC) 10 MG TABLET] Take 1 tablet (10 mg total) by mouth daily., Disp: 30 tablet, Rfl: 0     cranberry 400 mg  cap, [CRANBERRY 400 MG CAP] Take by mouth 2 (two) times a day., Disp: , Rfl:      cyanocobalamin 500 MCG tablet, [CYANOCOBALAMIN 500 MCG TABLET] Take by mouth daily., Disp: , Rfl:      DULoxetine (CYMBALTA) 60 MG capsule, [DULOXETINE (CYMBALTA) 60 MG CAPSULE] Take 60 mg by mouth daily., Disp: , Rfl:      GREEN TEA LEAF & TEA LEAF EXTR (GREEN TEA COMPLEX ORAL), [GREEN TEA LEAF & TEA LEAF EXTR (GREEN TEA COMPLEX ORAL)] Take by mouth daily., Disp: , Rfl:      losartan (COZAAR) 50 MG tablet, [LOSARTAN (COZAAR) 50 MG TABLET] Take 0.5 tablets (25 mg total) by mouth every evening., Disp: 30 tablet, Rfl: 0     lysine 500 mg Tab, [LYSINE 500 MG TAB] Take by mouth every morning., Disp: , Rfl:      mirabegron (MYRBETRIQ) 50 mg Tb24 ER tablet, [MIRABEGRON (MYRBETRIQ) 50 MG TB24 ER TABLET] Take 50 mg by mouth daily., Disp: , Rfl:      omeprazole (PRILOSEC) 20 MG capsule, [OMEPRAZOLE (PRILOSEC) 20 MG CAPSULE] Take 20 mg by mouth 2 (two) times a day before meals. Before breakfast and supper, Disp: , Rfl:      pyridoxine, vitamin B6, (B-6) 25 MG tablet, [PYRIDOXINE, VITAMIN B6, (B-6) 25 MG TABLET] Take by mouth daily. Pt is not sure of strength; MD-please choose strength if you order for pt here, Disp: , Rfl:      rivaroxaban 20 mg Tab, [RIVAROXABAN 20 MG TAB] Take 20 mg by mouth daily with supper., Disp: , Rfl:      senna-docusate (PERICOLACE) 8.6-50 mg tablet, [SENNA-DOCUSATE (PERICOLACE) 8.6-50 MG TABLET] Take 1 tablet by mouth 2 (two) times a day as needed for constipation., Disp: 30 tablet, Rfl: 0     tiotropium (SPIRIVA) 18 mcg inhalation capsule, [TIOTROPIUM (SPIRIVA) 18 MCG INHALATION CAPSULE] Place 1 capsule (2 puffs total) into inhaler and inhale once daily., Disp: 30 capsule, Rfl: 1     traZODone (DESYREL) 50 MG tablet, [TRAZODONE (DESYREL) 50 MG TABLET] Take 50 mg by mouth bedtime as needed for sleep., Disp: , Rfl:      UNABLE TO FIND, [UNABLE TO FIND] Take by mouth daily. Med Name vitamin A and D:, Disp: , Rfl:       UNABLE TO FIND, [UNABLE TO FIND] daily. Med Name:, Disp: , Rfl:      vitamin E 400 UNIT capsule, [VITAMIN E 400 UNIT CAPSULE] Take 400 Units by mouth daily., Disp: , Rfl:     ALLERGIES:  Allergies   Allergen Reactions     Sulfa (Sulfonamide Antibiotics) [Sulfa Drugs] Swelling       FAMILY HISTORY:  No family history on file.    SOCIAL HISTORY:   Social History     Socioeconomic History     Marital status: Single   Tobacco Use     Smoking status: Never Smoker     Smokeless tobacco: Never Used   Substance and Sexual Activity     Alcohol use: No     Drug use: No       PHYSICAL EXAM:    Vitals: BP (!) 168/91   Pulse 81   Temp 98.6  F (37  C) (Oral)   Resp 30   SpO2 92%    Constitutional: Well developed, well nourished. Comfortable appearing, coughing periodically.  HENT: Normocephalic, atraumatic, mucous membranes moist, nose normal. Neck- Supple, gross ROM intact.   Eyes: Pupils mid-range, sclera white, no discharge  Respiratory: Clear to auscultation bilaterally, no respiratory distress, no wheezing, speaks full sentences easily.  Cardiovascular: Tachycardic rate, regular rhythm. Trace lower extremity edema  GI: Soft, no tenderness to deep palpation in all quadrants, no masses.  Musculoskeletal: Moving all 4 extremities intentionally and without pain. No obvious deformity.  Skin: Warm, dry, no rash.  Neurologic: Alert & oriented x 3, speech clear, moving all extremities spontaneously   Psychiatric: Affect normal, cooperative.     LAB:  All pertinent labs reviewed and interpreted.  Labs Ordered and Resulted from Time of ED Arrival to Time of ED Departure   CBC WITH PLATELETS - Abnormal       Result Value    WBC Count 7.7      RBC Count 3.99      Hemoglobin 11.0 (*)     Hematocrit 34.2 (*)     MCV 86      MCH 27.6      MCHC 32.2      RDW 15.6 (*)     Platelet Count 326     BASIC METABOLIC PANEL - Abnormal    Sodium 138      Potassium 4.5      Chloride 103      Carbon Dioxide (CO2) 23      Anion Gap 12      Urea  Nitrogen 23      Creatinine 1.17 (*)     Calcium 9.3      Glucose 85      GFR Estimate 45 (*)    D DIMER QUANTITATIVE - Abnormal    D-Dimer Quantitative 2.36 (*)    TROPONIN I - Normal    Troponin I 0.01         RADIOLOGY:  CT Chest Pulmonary Embolism w Contrast   Final Result   IMPRESSION:   1.  Faint opacification of the peripheral aspect of the pulmonary arteries. No pulmonary embolism is seen up to the first division segmental branches. Dilated central pulmonary arteries suggesting pulmonary hypertension.   2.  Subsegmental atelectasis lingula and left lower lobe. Lungs are otherwise clear.   3.  Moderate cardiomegaly.   4.  Partially visualized large, exophytic left renal cyst. Left renal hilar cystic structure which which may represent parapelvic cysts versus hydronephrosis. If clinically indicated this may be further assessed with ultrasonography.      XR Chest Port 1 View   Final Result   IMPRESSION: Shallow inspiration and prominent cardiac fat pads further exaggerate mild/moderate generalized cardiac enlargement and borderline pulmonary venous congestion. No focal pulmonary consolidation. No visible pleural fluid. Opacity projected over    the right lateral costophrenic sulcus is unchanged, extra-pulmonary and could represent small fat-containing diaphragmatic hernia.          EKG:   Performed at: 17:13  Impression: Sinus rhythm with frequent premature ventricular complexes; left axis deviation, low voltage QRS and possible lateral infarct cited on or before July 18, 2016.  Rate: 94  Rhythm: Sinus rhythm with frequent premature ventricular complexes  QRS Interval: 84  QTc Interval: 437  Comparison: No significant changes when compared to July 18, 2016  I have independently reviewed and interpreted the EKG(s) documented above.     PROCEDURES:   Procedures       I, Katlyn Sosa, am serving as a scribe to document services personally performed by Dr. Teena Rodriguez based on my observation and the  provider's statements to me. I, Teena Rodriguez MD attest that Katlyn Sosa is acting in a scribe capacity, has observed my performance of the services and has documented them in accordance with my direction.      Teena Rodriguez M.D.  Emergency Medicine  [facility] EMERGENCY DEPARTMENT  [address] [place] [id number]  [phone]  Dept: [phone]       Teena Rodriguez MD  04/22/22 2111

## 2022-04-23 LAB
ATRIAL RATE - MUSE: 94 BPM
DIASTOLIC BLOOD PRESSURE - MUSE: 95 MMHG
INTERPRETATION ECG - MUSE: NORMAL
P AXIS - MUSE: 34 DEGREES
PR INTERVAL - MUSE: 182 MS
QRS DURATION - MUSE: 84 MS
QT - MUSE: 350 MS
QTC - MUSE: 437 MS
R AXIS - MUSE: -50 DEGREES
SYSTOLIC BLOOD PRESSURE - MUSE: 206 MMHG
T AXIS - MUSE: 52 DEGREES
VENTRICULAR RATE- MUSE: 94 BPM

## 2022-04-23 NOTE — DISCHARGE INSTRUCTIONS
Your workup today was generally reassuring.  Imaging did not show any obvious pneumonia or blood clots in the lungs. It did show cardiomegaly (enlarged heart) and possible left renal cyst (will need ultrasound to further evaluate).    For fevers  You can take 650 mg of tylenol every 6-8 hours (no more than 3000 mg in 24 hours).    I will start you on some tessalon pearles for cough as well    Follow-up closely with a primary care doctor and return to the emergency department with worsening difficulty breathing, unable to keep down food or fluids, chest pain or any other concerns.

## 2022-04-23 NOTE — ED NOTES
Spoke with Kaitlin at [facility] and updated staff that patient will be return to facility. Asked patient if she would like family updated and patient declined.

## 2022-04-27 ENCOUNTER — OFFICE VISIT (OUTPATIENT)
Dept: FAMILY MEDICINE | Facility: CLINIC | Age: 87
End: 2022-04-27
Attending: STUDENT IN AN ORGANIZED HEALTH CARE EDUCATION/TRAINING PROGRAM
Payer: COMMERCIAL

## 2022-04-27 VITALS — DIASTOLIC BLOOD PRESSURE: 88 MMHG | HEART RATE: 104 BPM | OXYGEN SATURATION: 95 % | SYSTOLIC BLOOD PRESSURE: 122 MMHG

## 2022-04-27 DIAGNOSIS — U07.1 SARS-COV-2 POSITIVE: ICD-10-CM

## 2022-04-27 DIAGNOSIS — R05.9 COUGH: Primary | ICD-10-CM

## 2022-04-27 DIAGNOSIS — J44.9 OBSTRUCTIVE LUNG DISEASE (H): ICD-10-CM

## 2022-04-27 DIAGNOSIS — R06.02 SHORTNESS OF BREATH: ICD-10-CM

## 2022-04-27 PROCEDURE — 99204 OFFICE O/P NEW MOD 45 MIN: CPT | Performed by: FAMILY MEDICINE

## 2022-04-27 RX ORDER — DOXYCYCLINE HYCLATE 100 MG
100 TABLET ORAL 2 TIMES DAILY
Qty: 20 TABLET | Refills: 0 | Status: SHIPPED | OUTPATIENT
Start: 2022-04-27 | End: 2022-08-14

## 2022-04-27 NOTE — PROGRESS NOTES
Assessment & Plan     Cough  - XR Chest 2 Views  - doxycycline hyclate (VIBRA-TABS) 100 MG tablet  Dispense: 20 tablet; Refill: 0    Shortness of breath  - Primary Care Referral    SARS-CoV-2 positive    Obstructive lung disease (H)  - doxycycline hyclate (VIBRA-TABS) 100 MG tablet  Dispense: 20 tablet; Refill: 0  She continues to cough, and the cough does sound distressing.  Examination did show some crepitations noted on the left side.  Based off on that I am going to have her started on antibiotics.  I did review the evaluations that was done at the emergency room.  CT scan did show probable atelectasis on the left lower zone.  I did encourage her to use her medication as prescribed at the emergency room.  If she is not feeling better within the next couple of days or getting worse she will need to be seen for further evaluation.  I did offer to get an x-ray but she does not want to do that at this time.  I did not cancel it and if she does want to do the x-ray another time she can do that.                  Return in about 2 weeks (around 5/11/2022) for Follow up.    Regine Hamilton MD  [facility]    Chely Doherty is a 87 year old who presents for the following health issues     HPI   She comes in today for follow-up of emergency room visit where she had evaluation for shortness of breath and was found to have positive COVID-19 infection.  She was managed symptomatically and discharged home.  Evaluation included some lab including D-dimer that was slightly high as well as CT scan and x-ray.  CT scan did show fibrosis and subsegmental atelectasis around the inferior aspect of the lingula and the left lower lobe.  She otherwise did not have pulmonary embolism.  She noted that she has been having some cough since then and cough had been very constant and distressing.  She noted no chest pain, does not feel that she is short of breath more than she was before.  She does  have a history of COPD.  She does have runny nose as well as some congestion.  Has had no fevers and no chills.  Appetite is normal.    No family history on file.   Social History     Socioeconomic History     Marital status: Single     Spouse name: Not on file     Number of children: Not on file     Years of education: Not on file     Highest education level: Not on file   Occupational History     Not on file   Tobacco Use     Smoking status: Never Smoker     Smokeless tobacco: Never Used   Substance and Sexual Activity     Alcohol use: No     Drug use: No     Sexual activity: Not on file   Other Topics Concern     Not on file   Social History Narrative     Not on file     Social Determinants of Health     Financial Resource Strain: Not on file   Food Insecurity: Not on file   Transportation Needs: Not on file   Physical Activity: Not on file   Stress: Not on file   Social Connections: Not on file   Intimate Partner Violence: Not on file   Housing Stability: Not on file      Past Surgical History:   Procedure Laterality Date     IR ABSCESS DRAIN INSERTION  7/30/2016     LAPAROSCOPIC CHOLECYSTECTOMY  07/19/2016     LAPAROSCOPIC CHOLECYSTECTOMY N/A 7/18/2016    Procedure: CHOLECYSTECTOMY LAPAROSCOPIC;  Surgeon: Brianna Cast MD;  Location: [facility];  Service:      SPINAL FUSION        No past medical history on file.         Review of Systems   CONSTITUTIONAL: NEGATIVE for fever, chills, change in weight  ENT/MOUTH: POSITIVE for hoarseness and postnasal drainage and NEGATIVE for fever  RESP:POSITIVE for cough-non productive  CV: NEGATIVE for chest pain, palpitations or peripheral edema  PSYCHIATRIC: NEGATIVE for changes in mood or affect  ROS otherwise negative      Objective    /88 (BP Location: Left arm, Patient Position: Sitting, Cuff Size: Adult Large)   Pulse 104   SpO2 95%   There is no height or weight on file to calculate BMI.  Physical Exam   GENERAL: healthy, alert, obese and does have  consistent coughing that is dry sounding.  NECK: no adenopathy, no asymmetry, masses, or scars and thyroid normal to palpation  RESP: Auscultation did show some crepitations noted on the left lung zones.  There was also diffuse wheezing.  CV: regular rate and rhythm, normal S1 S2,no murmur, click or rub, no peripheral edema and peripheral pulses strong  ABDOMEN: soft, nontender, no hepatosplenomegaly, no masses and bowel sounds normal  MS: no gross musculoskeletal defects noted, no edema

## 2022-08-14 ENCOUNTER — APPOINTMENT (OUTPATIENT)
Dept: RADIOLOGY | Facility: HOSPITAL | Age: 87
DRG: 988 | End: 2022-08-14
Attending: UROLOGY
Payer: COMMERCIAL

## 2022-08-14 ENCOUNTER — ANESTHESIA EVENT (OUTPATIENT)
Dept: SURGERY | Facility: HOSPITAL | Age: 87
DRG: 988 | End: 2022-08-14
Payer: COMMERCIAL

## 2022-08-14 ENCOUNTER — HOSPITAL ENCOUNTER (INPATIENT)
Facility: HOSPITAL | Age: 87
LOS: 4 days | Discharge: NURSING, REHAB OR LONG-TERM CARE | DRG: 988 | End: 2022-08-18
Attending: EMERGENCY MEDICINE | Admitting: INTERNAL MEDICINE
Payer: COMMERCIAL

## 2022-08-14 ENCOUNTER — APPOINTMENT (OUTPATIENT)
Dept: CT IMAGING | Facility: HOSPITAL | Age: 87
DRG: 988 | End: 2022-08-14
Attending: EMERGENCY MEDICINE
Payer: COMMERCIAL

## 2022-08-14 ENCOUNTER — ANESTHESIA (OUTPATIENT)
Dept: SURGERY | Facility: HOSPITAL | Age: 87
DRG: 988 | End: 2022-08-14
Payer: COMMERCIAL

## 2022-08-14 DIAGNOSIS — N43.3 LEFT HYDROCELE: Primary | ICD-10-CM

## 2022-08-14 DIAGNOSIS — K92.2 GASTROINTESTINAL HEMORRHAGE, UNSPECIFIED GASTROINTESTINAL HEMORRHAGE TYPE: ICD-10-CM

## 2022-08-14 DIAGNOSIS — N30.00 ACUTE CYSTITIS WITHOUT HEMATURIA: ICD-10-CM

## 2022-08-14 DIAGNOSIS — N13.30 HYDRONEPHROSIS OF LEFT KIDNEY: ICD-10-CM

## 2022-08-14 LAB
ABO/RH(D): NORMAL
ALBUMIN SERPL-MCNC: 3.1 G/DL (ref 3.5–5)
ALBUMIN UR-MCNC: 100 MG/DL
ALP SERPL-CCNC: 114 U/L (ref 45–120)
ALT SERPL W P-5'-P-CCNC: 51 U/L (ref 0–45)
ANION GAP SERPL CALCULATED.3IONS-SCNC: 16 MMOL/L (ref 5–18)
ANION GAP SERPL CALCULATED.3IONS-SCNC: 23 MMOL/L (ref 5–18)
ANTIBODY SCREEN: NEGATIVE
APPEARANCE UR: ABNORMAL
APTT PPP: 32 SECONDS (ref 22–38)
AST SERPL W P-5'-P-CCNC: 110 U/L (ref 0–40)
BASOPHILS # BLD AUTO: 0.1 10E3/UL (ref 0–0.2)
BASOPHILS NFR BLD AUTO: 0 %
BILIRUB SERPL-MCNC: 0.3 MG/DL (ref 0–1)
BILIRUB UR QL STRIP: NEGATIVE
BUN SERPL-MCNC: 37 MG/DL (ref 8–28)
BUN SERPL-MCNC: 38 MG/DL (ref 8–28)
CALCIUM SERPL-MCNC: 8.9 MG/DL (ref 8.5–10.5)
CALCIUM SERPL-MCNC: 9.6 MG/DL (ref 8.5–10.5)
CHLORIDE BLD-SCNC: 101 MMOL/L (ref 98–107)
CHLORIDE BLD-SCNC: 104 MMOL/L (ref 98–107)
CO2 SERPL-SCNC: 14 MMOL/L (ref 22–31)
CO2 SERPL-SCNC: 18 MMOL/L (ref 22–31)
COLOR UR AUTO: YELLOW
CREAT SERPL-MCNC: 1.89 MG/DL (ref 0.6–1.1)
CREAT SERPL-MCNC: 2.28 MG/DL (ref 0.6–1.1)
EOSINOPHIL # BLD AUTO: 0.1 10E3/UL (ref 0–0.7)
EOSINOPHIL NFR BLD AUTO: 0 %
ERYTHROCYTE [DISTWIDTH] IN BLOOD BY AUTOMATED COUNT: 15.4 % (ref 10–15)
GFR SERPL CREATININE-BSD FRML MDRD: 20 ML/MIN/1.73M2
GFR SERPL CREATININE-BSD FRML MDRD: 25 ML/MIN/1.73M2
GLUCOSE BLD-MCNC: 112 MG/DL (ref 70–125)
GLUCOSE BLD-MCNC: 214 MG/DL (ref 70–125)
GLUCOSE UR STRIP-MCNC: NEGATIVE MG/DL
HCT VFR BLD AUTO: 33.6 % (ref 35–47)
HGB BLD-MCNC: 10 G/DL (ref 11.7–15.7)
HGB BLD-MCNC: 10.6 G/DL (ref 11.7–15.7)
HGB BLD-MCNC: 9.2 G/DL (ref 11.7–15.7)
HGB BLD-MCNC: 9.5 G/DL (ref 11.7–15.7)
HGB UR QL STRIP: ABNORMAL
IMM GRANULOCYTES # BLD: 0.8 10E3/UL
IMM GRANULOCYTES NFR BLD: 4 %
INR PPP: 2.04 (ref 0.85–1.15)
KETONES UR STRIP-MCNC: NEGATIVE MG/DL
LEUKOCYTE ESTERASE UR QL STRIP: ABNORMAL
LYMPHOCYTES # BLD AUTO: 1.8 10E3/UL (ref 0.8–5.3)
LYMPHOCYTES NFR BLD AUTO: 9 %
MAGNESIUM SERPL-MCNC: 1.7 MG/DL (ref 1.8–2.6)
MCH RBC QN AUTO: 29.1 PG (ref 26.5–33)
MCHC RBC AUTO-ENTMCNC: 31.5 G/DL (ref 31.5–36.5)
MCV RBC AUTO: 92 FL (ref 78–100)
MONOCYTES # BLD AUTO: 0.8 10E3/UL (ref 0–1.3)
MONOCYTES NFR BLD AUTO: 4 %
NEUTROPHILS # BLD AUTO: 18 10E3/UL (ref 1.6–8.3)
NEUTROPHILS NFR BLD AUTO: 83 %
NITRATE UR QL: NEGATIVE
NRBC # BLD AUTO: 0 10E3/UL
NRBC BLD AUTO-RTO: 0 /100
PH UR STRIP: 6 [PH] (ref 5–7)
PLATELET # BLD AUTO: 396 10E3/UL (ref 150–450)
POTASSIUM BLD-SCNC: 4.9 MMOL/L (ref 3.5–5)
POTASSIUM BLD-SCNC: 5.2 MMOL/L (ref 3.5–5)
PROT SERPL-MCNC: 7.5 G/DL (ref 6–8)
RBC # BLD AUTO: 3.64 10E6/UL (ref 3.8–5.2)
RBC URINE: 71 /HPF
SARS-COV-2 RNA RESP QL NAA+PROBE: NEGATIVE
SODIUM SERPL-SCNC: 138 MMOL/L (ref 136–145)
SODIUM SERPL-SCNC: 138 MMOL/L (ref 136–145)
SP GR UR STRIP: 1.04 (ref 1–1.03)
SPECIMEN EXPIRATION DATE: NORMAL
UROBILINOGEN UR STRIP-MCNC: <2 MG/DL
WBC # BLD AUTO: 20.6 10E3/UL (ref 4–11)
WBC CLUMPS #/AREA URNS HPF: PRESENT /HPF
WBC URINE: >182 /HPF

## 2022-08-14 PROCEDURE — 370N000017 HC ANESTHESIA TECHNICAL FEE, PER MIN: Performed by: UROLOGY

## 2022-08-14 PROCEDURE — BW111ZZ FLUOROSCOPY OF ABDOMEN AND PELVIS USING LOW OSMOLAR CONTRAST: ICD-10-PCS | Performed by: UROLOGY

## 2022-08-14 PROCEDURE — 250N000009 HC RX 250: Performed by: EMERGENCY MEDICINE

## 2022-08-14 PROCEDURE — 258N000003 HC RX IP 258 OP 636: Performed by: INTERNAL MEDICINE

## 2022-08-14 PROCEDURE — 250N000011 HC RX IP 250 OP 636: Performed by: INTERNAL MEDICINE

## 2022-08-14 PROCEDURE — 250N000013 HC RX MED GY IP 250 OP 250 PS 637: Performed by: INTERNAL MEDICINE

## 2022-08-14 PROCEDURE — P9045 ALBUMIN (HUMAN), 5%, 250 ML: HCPCS | Performed by: NURSE ANESTHETIST, CERTIFIED REGISTERED

## 2022-08-14 PROCEDURE — 0T778DZ DILATION OF LEFT URETER WITH INTRALUMINAL DEVICE, VIA NATURAL OR ARTIFICIAL OPENING ENDOSCOPIC: ICD-10-PCS | Performed by: UROLOGY

## 2022-08-14 PROCEDURE — 86923 COMPATIBILITY TEST ELECTRIC: CPT | Performed by: INTERNAL MEDICINE

## 2022-08-14 PROCEDURE — 99223 1ST HOSP IP/OBS HIGH 75: CPT | Performed by: INTERNAL MEDICINE

## 2022-08-14 PROCEDURE — 86901 BLOOD TYPING SEROLOGIC RH(D): CPT | Performed by: EMERGENCY MEDICINE

## 2022-08-14 PROCEDURE — 86920 COMPATIBILITY TEST SPIN: CPT | Performed by: HOSPITALIST

## 2022-08-14 PROCEDURE — C1769 GUIDE WIRE: HCPCS | Performed by: UROLOGY

## 2022-08-14 PROCEDURE — 258N000003 HC RX IP 258 OP 636: Performed by: EMERGENCY MEDICINE

## 2022-08-14 PROCEDURE — 85730 THROMBOPLASTIN TIME PARTIAL: CPT | Performed by: EMERGENCY MEDICINE

## 2022-08-14 PROCEDURE — 85610 PROTHROMBIN TIME: CPT | Performed by: EMERGENCY MEDICINE

## 2022-08-14 PROCEDURE — C9803 HOPD COVID-19 SPEC COLLECT: HCPCS

## 2022-08-14 PROCEDURE — C2617 STENT, NON-COR, TEM W/O DEL: HCPCS | Performed by: UROLOGY

## 2022-08-14 PROCEDURE — 86920 COMPATIBILITY TEST SPIN: CPT | Performed by: INTERNAL MEDICINE

## 2022-08-14 PROCEDURE — C9113 INJ PANTOPRAZOLE SODIUM, VIA: HCPCS | Performed by: EMERGENCY MEDICINE

## 2022-08-14 PROCEDURE — 83735 ASSAY OF MAGNESIUM: CPT | Performed by: INTERNAL MEDICINE

## 2022-08-14 PROCEDURE — G0378 HOSPITAL OBSERVATION PER HR: HCPCS

## 2022-08-14 PROCEDURE — 250N000009 HC RX 250: Performed by: NURSE ANESTHETIST, CERTIFIED REGISTERED

## 2022-08-14 PROCEDURE — 250N000011 HC RX IP 250 OP 636: Performed by: EMERGENCY MEDICINE

## 2022-08-14 PROCEDURE — 74174 CTA ABD&PLVS W/CONTRAST: CPT

## 2022-08-14 PROCEDURE — 85025 COMPLETE CBC W/AUTO DIFF WBC: CPT | Performed by: EMERGENCY MEDICINE

## 2022-08-14 PROCEDURE — 96365 THER/PROPH/DIAG IV INF INIT: CPT | Mod: 59

## 2022-08-14 PROCEDURE — 250N000011 HC RX IP 250 OP 636: Performed by: NURSE ANESTHETIST, CERTIFIED REGISTERED

## 2022-08-14 PROCEDURE — 36415 COLL VENOUS BLD VENIPUNCTURE: CPT | Performed by: EMERGENCY MEDICINE

## 2022-08-14 PROCEDURE — 82310 ASSAY OF CALCIUM: CPT | Performed by: EMERGENCY MEDICINE

## 2022-08-14 PROCEDURE — 80053 COMPREHEN METABOLIC PANEL: CPT | Performed by: EMERGENCY MEDICINE

## 2022-08-14 PROCEDURE — 255N000002 HC RX 255 OP 636: Performed by: UROLOGY

## 2022-08-14 PROCEDURE — 96361 HYDRATE IV INFUSION ADD-ON: CPT

## 2022-08-14 PROCEDURE — 36415 COLL VENOUS BLD VENIPUNCTURE: CPT | Performed by: INTERNAL MEDICINE

## 2022-08-14 PROCEDURE — 87040 BLOOD CULTURE FOR BACTERIA: CPT | Performed by: INTERNAL MEDICINE

## 2022-08-14 PROCEDURE — 272N000001 HC OR GENERAL SUPPLY STERILE: Performed by: UROLOGY

## 2022-08-14 PROCEDURE — 210N000001 HC R&B IMCU HEART CARE

## 2022-08-14 PROCEDURE — 85018 HEMOGLOBIN: CPT | Performed by: INTERNAL MEDICINE

## 2022-08-14 PROCEDURE — 258N000003 HC RX IP 258 OP 636: Performed by: ANESTHESIOLOGY

## 2022-08-14 PROCEDURE — 81001 URINALYSIS AUTO W/SCOPE: CPT | Performed by: INTERNAL MEDICINE

## 2022-08-14 PROCEDURE — 94640 AIRWAY INHALATION TREATMENT: CPT

## 2022-08-14 PROCEDURE — 87086 URINE CULTURE/COLONY COUNT: CPT | Performed by: INTERNAL MEDICINE

## 2022-08-14 PROCEDURE — 258N000003 HC RX IP 258 OP 636: Performed by: NURSE ANESTHETIST, CERTIFIED REGISTERED

## 2022-08-14 PROCEDURE — 99285 EMERGENCY DEPT VISIT HI MDM: CPT | Mod: 25

## 2022-08-14 PROCEDURE — 360N000082 HC SURGERY LEVEL 2 W/ FLUORO, PER MIN: Performed by: UROLOGY

## 2022-08-14 PROCEDURE — 85018 HEMOGLOBIN: CPT | Performed by: EMERGENCY MEDICINE

## 2022-08-14 PROCEDURE — U0005 INFEC AGEN DETEC AMPLI PROBE: HCPCS | Performed by: EMERGENCY MEDICINE

## 2022-08-14 PROCEDURE — 999N000180 XR SURGERY CARM FLUORO LESS THAN 5 MIN

## 2022-08-14 PROCEDURE — 96375 TX/PRO/DX INJ NEW DRUG ADDON: CPT

## 2022-08-14 PROCEDURE — 999N000141 HC STATISTIC PRE-PROCEDURE NURSING ASSESSMENT: Performed by: UROLOGY

## 2022-08-14 DEVICE — URETERAL STENT
Type: IMPLANTABLE DEVICE | Site: URETER | Status: FUNCTIONAL
Brand: PERCUFLEX™ PLUS

## 2022-08-14 RX ORDER — CETIRIZINE HYDROCHLORIDE 5 MG/1
5 TABLET ORAL DAILY
COMMUNITY

## 2022-08-14 RX ORDER — BENZONATATE 100 MG/1
100 CAPSULE ORAL 3 TIMES DAILY PRN
Status: DISCONTINUED | OUTPATIENT
Start: 2022-08-14 | End: 2022-08-18 | Stop reason: HOSPADM

## 2022-08-14 RX ORDER — PIPERACILLIN SODIUM, TAZOBACTAM SODIUM 3; .375 G/15ML; G/15ML
3.38 INJECTION, POWDER, LYOPHILIZED, FOR SOLUTION INTRAVENOUS EVERY 8 HOURS
Status: DISCONTINUED | OUTPATIENT
Start: 2022-08-14 | End: 2022-08-16

## 2022-08-14 RX ORDER — ONDANSETRON 2 MG/ML
4 INJECTION INTRAMUSCULAR; INTRAVENOUS EVERY 6 HOURS PRN
Status: DISCONTINUED | OUTPATIENT
Start: 2022-08-14 | End: 2022-08-14

## 2022-08-14 RX ORDER — SODIUM CHLORIDE 9 MG/ML
INJECTION, SOLUTION INTRAVENOUS CONTINUOUS
Status: DISCONTINUED | OUTPATIENT
Start: 2022-08-14 | End: 2022-08-14

## 2022-08-14 RX ORDER — ONDANSETRON 2 MG/ML
4 INJECTION INTRAMUSCULAR; INTRAVENOUS EVERY 30 MIN PRN
Status: DISCONTINUED | OUTPATIENT
Start: 2022-08-14 | End: 2022-08-14 | Stop reason: HOSPADM

## 2022-08-14 RX ORDER — LANOLIN ALCOHOL/MO/W.PET/CERES
3 CREAM (GRAM) TOPICAL
Status: DISCONTINUED | OUTPATIENT
Start: 2022-08-14 | End: 2022-08-18 | Stop reason: HOSPADM

## 2022-08-14 RX ORDER — BENZOCAINE/MENTHOL 6 MG-10 MG
LOZENGE MUCOUS MEMBRANE 2 TIMES DAILY PRN
Status: ON HOLD | COMMUNITY
End: 2023-03-20

## 2022-08-14 RX ORDER — DEXAMETHASONE SODIUM PHOSPHATE 10 MG/ML
INJECTION, SOLUTION INTRAMUSCULAR; INTRAVENOUS PRN
Status: DISCONTINUED | OUTPATIENT
Start: 2022-08-14 | End: 2022-08-14

## 2022-08-14 RX ORDER — CLOTRIMAZOLE 1 %
CREAM (GRAM) TOPICAL 2 TIMES DAILY
Status: ON HOLD | COMMUNITY
End: 2023-03-20

## 2022-08-14 RX ORDER — SODIUM CHLORIDE, SODIUM LACTATE, POTASSIUM CHLORIDE, CALCIUM CHLORIDE 600; 310; 30; 20 MG/100ML; MG/100ML; MG/100ML; MG/100ML
INJECTION, SOLUTION INTRAVENOUS CONTINUOUS PRN
Status: DISCONTINUED | OUTPATIENT
Start: 2022-08-14 | End: 2022-08-14

## 2022-08-14 RX ORDER — HYDROCORTISONE ACETATE 25 MG/1
25 SUPPOSITORY RECTAL 2 TIMES DAILY
Status: DISCONTINUED | OUTPATIENT
Start: 2022-08-14 | End: 2022-08-17 | Stop reason: ALTCHOICE

## 2022-08-14 RX ORDER — LIDOCAINE HYDROCHLORIDE 10 MG/ML
INJECTION, SOLUTION INFILTRATION; PERINEURAL PRN
Status: DISCONTINUED | OUTPATIENT
Start: 2022-08-14 | End: 2022-08-14

## 2022-08-14 RX ORDER — PROCHLORPERAZINE MALEATE 5 MG
5 TABLET ORAL EVERY 6 HOURS PRN
Status: DISCONTINUED | OUTPATIENT
Start: 2022-08-14 | End: 2022-08-18 | Stop reason: HOSPADM

## 2022-08-14 RX ORDER — LOSARTAN POTASSIUM 100 MG/1
100 TABLET ORAL DAILY
Status: ON HOLD | COMMUNITY
End: 2023-03-21

## 2022-08-14 RX ORDER — AMOXICILLIN 250 MG
1 CAPSULE ORAL AT BEDTIME
Status: ON HOLD | COMMUNITY
End: 2023-03-20

## 2022-08-14 RX ORDER — ONDANSETRON 2 MG/ML
INJECTION INTRAMUSCULAR; INTRAVENOUS PRN
Status: DISCONTINUED | OUTPATIENT
Start: 2022-08-14 | End: 2022-08-14

## 2022-08-14 RX ORDER — SODIUM CHLORIDE, SODIUM LACTATE, POTASSIUM CHLORIDE, CALCIUM CHLORIDE 600; 310; 30; 20 MG/100ML; MG/100ML; MG/100ML; MG/100ML
INJECTION, SOLUTION INTRAVENOUS CONTINUOUS
Status: DISCONTINUED | OUTPATIENT
Start: 2022-08-14 | End: 2022-08-14 | Stop reason: HOSPADM

## 2022-08-14 RX ORDER — AMOXICILLIN 250 MG
1 CAPSULE ORAL 2 TIMES DAILY PRN
Status: ON HOLD | COMMUNITY
End: 2023-03-21

## 2022-08-14 RX ORDER — FENTANYL CITRATE 50 UG/ML
25 INJECTION, SOLUTION INTRAMUSCULAR; INTRAVENOUS EVERY 5 MIN PRN
Status: DISCONTINUED | OUTPATIENT
Start: 2022-08-14 | End: 2022-08-14 | Stop reason: HOSPADM

## 2022-08-14 RX ORDER — ACETAMINOPHEN 500 MG
1000 TABLET ORAL 3 TIMES DAILY PRN
COMMUNITY

## 2022-08-14 RX ORDER — ESTRADIOL 0.1 MG/G
1 CREAM VAGINAL
Status: ON HOLD | COMMUNITY
End: 2023-03-20

## 2022-08-14 RX ORDER — NITROGLYCERIN 0.4 MG/1
0.4 TABLET SUBLINGUAL EVERY 5 MIN PRN
Status: DISCONTINUED | OUTPATIENT
Start: 2022-08-14 | End: 2022-08-18 | Stop reason: HOSPADM

## 2022-08-14 RX ORDER — ALBUTEROL SULFATE 90 UG/1
2 AEROSOL, METERED RESPIRATORY (INHALATION) EVERY 4 HOURS PRN
Status: DISCONTINUED | OUTPATIENT
Start: 2022-08-14 | End: 2022-08-18 | Stop reason: HOSPADM

## 2022-08-14 RX ORDER — OXYCODONE HYDROCHLORIDE 5 MG/1
5 TABLET ORAL EVERY 4 HOURS PRN
Status: DISCONTINUED | OUTPATIENT
Start: 2022-08-14 | End: 2022-08-14 | Stop reason: HOSPADM

## 2022-08-14 RX ORDER — PROCHLORPERAZINE 25 MG
12.5 SUPPOSITORY, RECTAL RECTAL EVERY 12 HOURS PRN
Status: DISCONTINUED | OUTPATIENT
Start: 2022-08-14 | End: 2022-08-18 | Stop reason: HOSPADM

## 2022-08-14 RX ORDER — ATORVASTATIN CALCIUM 10 MG/1
20 TABLET, FILM COATED ORAL AT BEDTIME
Status: DISCONTINUED | OUTPATIENT
Start: 2022-08-14 | End: 2022-08-18 | Stop reason: HOSPADM

## 2022-08-14 RX ORDER — LOSARTAN POTASSIUM 50 MG/1
100 TABLET ORAL DAILY
Status: DISCONTINUED | OUTPATIENT
Start: 2022-08-14 | End: 2022-08-18 | Stop reason: HOSPADM

## 2022-08-14 RX ORDER — IPRATROPIUM BROMIDE AND ALBUTEROL 20; 100 UG/1; UG/1
1 SPRAY, METERED RESPIRATORY (INHALATION) 4 TIMES DAILY PRN
COMMUNITY

## 2022-08-14 RX ORDER — ONDANSETRON 4 MG/1
4 TABLET, ORALLY DISINTEGRATING ORAL EVERY 6 HOURS PRN
Status: DISCONTINUED | OUTPATIENT
Start: 2022-08-14 | End: 2022-08-14

## 2022-08-14 RX ORDER — CEFAZOLIN SODIUM/WATER 3 G/30 ML
3 SYRINGE (ML) INTRAVENOUS SEE ADMIN INSTRUCTIONS
Status: DISCONTINUED | OUTPATIENT
Start: 2022-08-14 | End: 2022-08-14 | Stop reason: HOSPADM

## 2022-08-14 RX ORDER — SODIUM CHLORIDE 9 MG/ML
INJECTION, SOLUTION INTRAVENOUS CONTINUOUS
Status: DISCONTINUED | OUTPATIENT
Start: 2022-08-14 | End: 2022-08-15

## 2022-08-14 RX ORDER — ACETAMINOPHEN 325 MG/1
650 TABLET ORAL EVERY 6 HOURS PRN
Status: DISCONTINUED | OUTPATIENT
Start: 2022-08-14 | End: 2022-08-18 | Stop reason: HOSPADM

## 2022-08-14 RX ORDER — CEFAZOLIN SODIUM/WATER 3 G/30 ML
3 SYRINGE (ML) INTRAVENOUS
Status: DISCONTINUED | OUTPATIENT
Start: 2022-08-14 | End: 2022-08-14 | Stop reason: HOSPADM

## 2022-08-14 RX ORDER — PYRIDOXINE HCL (VITAMIN B6) 25 MG
200 TABLET ORAL DAILY
Status: DISCONTINUED | OUTPATIENT
Start: 2022-08-14 | End: 2022-08-17

## 2022-08-14 RX ORDER — LIDOCAINE 40 MG/G
CREAM TOPICAL
Status: DISCONTINUED | OUTPATIENT
Start: 2022-08-14 | End: 2022-08-14 | Stop reason: HOSPADM

## 2022-08-14 RX ORDER — CHOLECALCIFEROL (VITAMIN D3) 50 MCG
2 TABLET ORAL DAILY
COMMUNITY

## 2022-08-14 RX ORDER — ONDANSETRON 4 MG/1
4 TABLET, ORALLY DISINTEGRATING ORAL EVERY 30 MIN PRN
Status: DISCONTINUED | OUTPATIENT
Start: 2022-08-14 | End: 2022-08-14 | Stop reason: HOSPADM

## 2022-08-14 RX ORDER — BECLOMETHASONE DIPROPIONATE HFA 80 UG/1
2 AEROSOL, METERED RESPIRATORY (INHALATION) 2 TIMES DAILY
COMMUNITY

## 2022-08-14 RX ORDER — MAGNESIUM HYDROXIDE/ALUMINUM HYDROXICE/SIMETHICONE 120; 1200; 1200 MG/30ML; MG/30ML; MG/30ML
30 SUSPENSION ORAL EVERY 4 HOURS PRN
Status: DISCONTINUED | OUTPATIENT
Start: 2022-08-14 | End: 2022-08-18 | Stop reason: HOSPADM

## 2022-08-14 RX ORDER — SODIUM CHLORIDE 9 MG/ML
INJECTION, SOLUTION INTRAVENOUS ONCE
Status: COMPLETED | OUTPATIENT
Start: 2022-08-14 | End: 2022-08-14

## 2022-08-14 RX ORDER — LOPERAMIDE HCL 2 MG
2 CAPSULE ORAL 3 TIMES DAILY PRN
Status: ON HOLD | COMMUNITY
End: 2023-03-20

## 2022-08-14 RX ORDER — IOPAMIDOL 755 MG/ML
80 INJECTION, SOLUTION INTRAVASCULAR ONCE
Status: COMPLETED | OUTPATIENT
Start: 2022-08-14 | End: 2022-08-14

## 2022-08-14 RX ORDER — PIPERACILLIN SODIUM, TAZOBACTAM SODIUM 3; .375 G/15ML; G/15ML
3.38 INJECTION, POWDER, LYOPHILIZED, FOR SOLUTION INTRAVENOUS ONCE
Status: COMPLETED | OUTPATIENT
Start: 2022-08-14 | End: 2022-08-14

## 2022-08-14 RX ORDER — PROPOFOL 10 MG/ML
INJECTION, EMULSION INTRAVENOUS CONTINUOUS PRN
Status: DISCONTINUED | OUTPATIENT
Start: 2022-08-14 | End: 2022-08-14

## 2022-08-14 RX ORDER — CALCIUM CARBONATE/VITAMIN D3 600 MG-10
500 TABLET ORAL DAILY
Status: DISCONTINUED | OUTPATIENT
Start: 2022-08-14 | End: 2022-08-18 | Stop reason: HOSPADM

## 2022-08-14 RX ORDER — CETIRIZINE HYDROCHLORIDE 5 MG/1
5 TABLET ORAL DAILY
Status: DISCONTINUED | OUTPATIENT
Start: 2022-08-14 | End: 2022-08-15

## 2022-08-14 RX ORDER — EMOLLIENT BASE
CREAM (GRAM) TOPICAL DAILY
Status: DISCONTINUED | OUTPATIENT
Start: 2022-08-14 | End: 2022-08-18 | Stop reason: HOSPADM

## 2022-08-14 RX ORDER — EMOLLIENT BASE
CREAM (GRAM) TOPICAL DAILY
Status: ON HOLD | COMMUNITY
End: 2023-03-20

## 2022-08-14 RX ORDER — DULOXETIN HYDROCHLORIDE 60 MG/1
60 CAPSULE, DELAYED RELEASE ORAL DAILY
Status: DISCONTINUED | OUTPATIENT
Start: 2022-08-14 | End: 2022-08-14

## 2022-08-14 RX ORDER — NYSTATIN 100000 [USP'U]/G
POWDER TOPICAL DAILY
Status: ON HOLD | COMMUNITY
End: 2023-03-20

## 2022-08-14 RX ORDER — AMOXICILLIN 250 MG
1 CAPSULE ORAL AT BEDTIME
Status: DISCONTINUED | OUTPATIENT
Start: 2022-08-14 | End: 2022-08-15

## 2022-08-14 RX ORDER — BENZONATATE 100 MG/1
100 CAPSULE ORAL 3 TIMES DAILY PRN
Status: ON HOLD | COMMUNITY
End: 2023-03-20

## 2022-08-14 RX ORDER — CLOTRIMAZOLE 1 %
CREAM (GRAM) TOPICAL 2 TIMES DAILY
Status: DISCONTINUED | OUTPATIENT
Start: 2022-08-14 | End: 2022-08-15

## 2022-08-14 RX ORDER — MAGNESIUM SULFATE HEPTAHYDRATE 40 MG/ML
2 INJECTION, SOLUTION INTRAVENOUS ONCE
Status: COMPLETED | OUTPATIENT
Start: 2022-08-14 | End: 2022-08-14

## 2022-08-14 RX ORDER — NITROGLYCERIN 0.4 MG/1
0.4 TABLET SUBLINGUAL EVERY 5 MIN PRN
Status: ON HOLD | COMMUNITY
End: 2023-03-20

## 2022-08-14 RX ADMIN — PHENYLEPHRINE HYDROCHLORIDE 200 MCG: 10 INJECTION INTRAVENOUS at 17:43

## 2022-08-14 RX ADMIN — LIDOCAINE HYDROCHLORIDE 20 MG: 10 INJECTION, SOLUTION INFILTRATION; PERINEURAL at 17:19

## 2022-08-14 RX ADMIN — PHENYLEPHRINE HYDROCHLORIDE 100 MCG: 10 INJECTION INTRAVENOUS at 17:39

## 2022-08-14 RX ADMIN — SODIUM CHLORIDE: 9 INJECTION, SOLUTION INTRAVENOUS at 09:49

## 2022-08-14 RX ADMIN — ATORVASTATIN CALCIUM 20 MG: 10 TABLET, FILM COATED ORAL at 22:26

## 2022-08-14 RX ADMIN — Medication 500 MCG: at 13:30

## 2022-08-14 RX ADMIN — Medication 200 MG: at 13:29

## 2022-08-14 RX ADMIN — PANTOPRAZOLE SODIUM 40 MG: 40 INJECTION, POWDER, FOR SOLUTION INTRAVENOUS at 07:44

## 2022-08-14 RX ADMIN — PIPERACILLIN AND TAZOBACTAM 3.38 G: 3; .375 INJECTION, POWDER, LYOPHILIZED, FOR SOLUTION INTRAVENOUS at 14:53

## 2022-08-14 RX ADMIN — PIPERACILLIN AND TAZOBACTAM 3.38 G: 3; .375 INJECTION, POWDER, LYOPHILIZED, FOR SOLUTION INTRAVENOUS at 22:26

## 2022-08-14 RX ADMIN — SODIUM CHLORIDE: 9 INJECTION, SOLUTION INTRAVENOUS at 03:33

## 2022-08-14 RX ADMIN — VANCOMYCIN HYDROCHLORIDE 1500 MG: 5 INJECTION, POWDER, LYOPHILIZED, FOR SOLUTION INTRAVENOUS at 20:49

## 2022-08-14 RX ADMIN — SODIUM CHLORIDE: 9 INJECTION, SOLUTION INTRAVENOUS at 14:56

## 2022-08-14 RX ADMIN — SODIUM BICARBONATE 50 MEQ: 84 INJECTION, SOLUTION INTRAVENOUS at 07:44

## 2022-08-14 RX ADMIN — UMECLIDINIUM 1 PUFF: 62.5 AEROSOL, POWDER ORAL at 13:30

## 2022-08-14 RX ADMIN — PHENYLEPHRINE HYDROCHLORIDE 100 MCG: 10 INJECTION INTRAVENOUS at 17:36

## 2022-08-14 RX ADMIN — HYDROCORTISONE ACETATE 25 MG: 25 SUPPOSITORY RECTAL at 20:50

## 2022-08-14 RX ADMIN — FLUTICASONE FUROATE 1 PUFF: 100 POWDER RESPIRATORY (INHALATION) at 13:31

## 2022-08-14 RX ADMIN — PROPOFOL 75 MCG/KG/MIN: 10 INJECTION, EMULSION INTRAVENOUS at 17:20

## 2022-08-14 RX ADMIN — PHENYLEPHRINE HYDROCHLORIDE 100 MCG: 10 INJECTION INTRAVENOUS at 17:34

## 2022-08-14 RX ADMIN — SODIUM CHLORIDE, POTASSIUM CHLORIDE, SODIUM LACTATE AND CALCIUM CHLORIDE 100 ML/HR: 600; 310; 30; 20 INJECTION, SOLUTION INTRAVENOUS at 17:01

## 2022-08-14 RX ADMIN — ALBUMIN HUMAN: 0.05 INJECTION, SOLUTION INTRAVENOUS at 17:40

## 2022-08-14 RX ADMIN — ONDANSETRON 4 MG: 2 INJECTION INTRAMUSCULAR; INTRAVENOUS at 17:20

## 2022-08-14 RX ADMIN — CETIRIZINE HYDROCHLORIDE 5 MG: 5 TABLET ORAL at 13:30

## 2022-08-14 RX ADMIN — IOPAMIDOL 75 ML: 755 INJECTION, SOLUTION INTRAVENOUS at 05:05

## 2022-08-14 RX ADMIN — MICONAZOLE NITRATE: 20 POWDER TOPICAL at 14:55

## 2022-08-14 RX ADMIN — PIPERACILLIN AND TAZOBACTAM 3.38 G: 3; .375 INJECTION, POWDER, LYOPHILIZED, FOR SOLUTION INTRAVENOUS at 07:44

## 2022-08-14 RX ADMIN — Medication: at 14:54

## 2022-08-14 RX ADMIN — MAGNESIUM SULFATE IN WATER 2 G: 40 INJECTION, SOLUTION INTRAVENOUS at 13:29

## 2022-08-14 RX ADMIN — SODIUM CHLORIDE, POTASSIUM CHLORIDE, SODIUM LACTATE AND CALCIUM CHLORIDE: 600; 310; 30; 20 INJECTION, SOLUTION INTRAVENOUS at 17:16

## 2022-08-14 ASSESSMENT — ACTIVITIES OF DAILY LIVING (ADL)
ADLS_ACUITY_SCORE: 37
ADLS_ACUITY_SCORE: 35
ADLS_ACUITY_SCORE: 35
ADLS_ACUITY_SCORE: 37
ADLS_ACUITY_SCORE: 37
ADLS_ACUITY_SCORE: 35

## 2022-08-14 ASSESSMENT — ENCOUNTER SYMPTOMS: DYSRHYTHMIAS: 1

## 2022-08-14 ASSESSMENT — COPD QUESTIONNAIRES: COPD: 1

## 2022-08-14 NOTE — CONSULTS
[facility] CONSULTATION    Type of Consult: inpatient  Place of Service: [facility]   Reason for consult: MELONY, gas in bladder, L hydroureteronephrosis  Request for consult by: Dr. VALLADARES    History of present illness:   Bessy Sevilla is a 88 year old female with a history of A. fib on Xarelto, known left hydronephrosis with a history of ureteral stone that was admitted for Gastrointestinal hemorrhage, unspecified gastrointestinal hemorrhage type. Urology is being consulted for MELONY, left hydroureteronephrosis seen on CT imaging from today.  History obtained through patient, and chart review.     Patient was brought into the emergency department today from her assisted living facility due to dark red blood saturating her bedding.  In the ED, patient hypertensive but vitals otherwise normal.  Labs notable for a drop in hemoglobin from 10.6-->9.2 following IV fluid bolus.  Now 10.0.  WBC 20.6, creatinine elevated at 2.28, now 1.89 (1.34 on 5/2/2022). UA pending at this time.  CT Abdo/pelvis does not show active GI bleed, although this is suspected to be the cause for rectal bleeding, anemia. Imaging urologically notable for progressive left renal cortical atrophy and chronic left renal pelvis and ureteral dilatation.  No discrete obstructing stone or mass lesion identified, though pelvic ureteral assessment is limited due to streak artifact from hip arthroplasties.  Left renal appearance is similar to 2016. Small nonobstructing R renal stone. Gas also seen in the bladder. Patient admitted to the hospitalist service with GI and urology consults pending. Conservative management of GI bleed per GI note at this time.     Patient follows with Dr. Barber with  Urology. She had cystolitholapaxy 8/2021 for a bladder stone (magnesium ammonium phosphate/struvite stone), as well as placement of a left ureteral stent for hydronephrosis though to be secondary to this large bladder stone near the UVJ vs ureteral  stricture in the setting of a history of radiation for cervical cancer. Other differential for hydro at that time includes noncompliant small capacity bladder as she did have evidence of reflux on intraop evaluation. Ureteral stent was removed 9/16/2021. Follow up CT IVP w/wo IV cont from 10/25/2021 showed worsening severe L hydronephrosis with tapering of the far distal ureter without definitive distal ureteral stone, possibly stricturing distally. Small nonobstructive left renal calculus. Patient was referred to [facility] for urinary incontinence and recurrent UTIs. She saw Dominique Grady PA-C, 1/11/2022. Patient ultimately decided on pelvic floor physical therapy and timed voiding for her symptoms. She has an appointment scheduled with Dominique for a recheck of symptoms on 8/16/2022.     At this time, patient states she's feeling okay. She is able to provide some history but has a little difficulty with recall. Denies flank or abdominal pain.     Past Medical History:  History reviewed. No pertinent past medical history.    Past Surgical History:  Past Surgical History:   Procedure Laterality Date     IR ABSCESS DRAIN INSERTION  7/30/2016     LAPAROSCOPIC CHOLECYSTECTOMY  07/19/2016     LAPAROSCOPIC CHOLECYSTECTOMY N/A 7/18/2016    Procedure: CHOLECYSTECTOMY LAPAROSCOPIC;  Surgeon: Brianna Cast MD;  Location: [facility];  Service:      SPINAL FUSION         Social History:  Social History     Socioeconomic History     Marital status: Single     Spouse name: Not on file     Number of children: Not on file     Years of education: Not on file     Highest education level: Not on file   Occupational History     Not on file   Tobacco Use     Smoking status: Never Smoker     Smokeless tobacco: Never Used   Substance and Sexual Activity     Alcohol use: No     Drug use: No     Sexual activity: Not on file   Other Topics Concern     Not on file   Social History Narrative     Not on file     Social  Determinants of Health     Financial Resource Strain: Not on file   Food Insecurity: Not on file   Transportation Needs: Not on file   Physical Activity: Not on file   Stress: Not on file   Social Connections: Not on file   Intimate Partner Violence: Not on file   Housing Stability: Not on file       Medications:  Current Facility-Administered Medications   Medication     alum & mag hydroxide-simethicone (MAALOX) suspension 30 mL     melatonin tablet 3 mg     ondansetron (ZOFRAN ODT) ODT tab 4 mg    Or     ondansetron (ZOFRAN) injection 4 mg     Patient is already receiving anticoagulation with heparin, enoxaparin (LOVENOX), warfarin (COUMADIN)  or other anticoagulant medication     sodium chloride 0.9% infusion     Current Outpatient Medications   Medication     albuterol (PROVENTIL HFA;VENTOLIN HFA) 90 mcg/actuation inhaler     amLODIPine (NORVASC) 5 MG tablet     ascorbic acid, vitamin C, (VITAMIN C) 500 MG tablet     atorvastatin (LIPITOR) 20 MG tablet     cetirizine (ZYRTEC) 10 MG tablet     cranberry 400 mg cap     cyanocobalamin 500 MCG tablet     doxycycline hyclate (VIBRA-TABS) 100 MG tablet     DULoxetine (CYMBALTA) 60 MG capsule     GREEN TEA LEAF & TEA LEAF EXTR (GREEN TEA COMPLEX ORAL)     losartan (COZAAR) 50 MG tablet     lysine 500 mg Tab     mirabegron (MYRBETRIQ) 50 mg Tb24 ER tablet     omeprazole (PRILOSEC) 20 MG capsule     pyridoxine, vitamin B6, (B-6) 25 MG tablet     rivaroxaban 20 mg Tab     senna-docusate (PERICOLACE) 8.6-50 mg tablet     tiotropium (SPIRIVA) 18 mcg inhalation capsule     traZODone (DESYREL) 50 MG tablet     UNABLE TO FIND     UNABLE TO FIND     vitamin E 400 UNIT capsule       Allergies:   Allergies   Allergen Reactions     Sulfa (Sulfonamide Antibiotics) [Sulfa Drugs] Swelling       Review of Systems:   A full 12 point review of systems was taken and is negative aside from what is noted above in the HPI    Physical Exam:  Temp:  [97.7  F (36.5  C)] 97.7  F (36.5   C)  Pulse:  [63-95] 95  Resp:  [16-26] 23  BP: ()/(53-98) 142/77  SpO2:  [96 %-100 %] 100 %  General: NAD, alert but somnolent, cooperative  Head: normocephalic, without abnormality / atraumatic  Abdomen: soft, non tender, non distended. No suprapubic fullness/tenderness. No CVA tenderness noted  Geniturinary: Rajput in place draining yellow urine, no clots in tubing or bag  Skin: no rashes or lesions  Psychological: alert and oriented, answers most questions appropriately, some difficulty with history recall      Labs:   Creatinine   Date Value Ref Range Status   08/14/2022 1.89 (H) 0.60 - 1.10 mg/dL Final       Lab Results   Component Value Date    WBC 20.6 08/14/2022     Lab Results   Component Value Date    HGB 9.2 08/14/2022     Lab Results   Component Value Date     08/14/2022       UA RESULTS:  Recent Labs   Lab Test 03/21/22  1630 01/06/22  1300 11/26/19  1549   COLOR Light Yellow   < > Yellow   APPEARANCE Turbid*   < > Cloudy*   URINEGLC Negative   < > Negative   URINEBILI Negative   < > Negative   URINEKETONE Negative   < > Negative   SG 1.010   < > 1.015   UBLD 0.06 mg/dL*   < > Large*   URINEPH 7.0   < > 6.0   PROTEIN 20 *   < > 30 mg/dL*   UROBILINOGEN  --   --  <2.0 E.U./dL   NITRITE Positive*   < > Negative   LEUKEST 500 Sadie/uL*   < > Large*   RBCU 21*   < > 10-25*   WBCU >182*   < > >100*    < > = values in this interval not displayed.     Color Urine (no units)   Date Value   08/14/2022 Yellow     Appearance Urine (no units)   Date Value   08/14/2022 Cloudy (A)     Glucose Urine (mg/dL)   Date Value   08/14/2022 Negative     Bilirubin Urine (no units)   Date Value   08/14/2022 Negative     Ketones Urine (mg/dL)   Date Value   08/14/2022 Negative     Specific Gravity Urine (no units)   Date Value   08/14/2022 1.037 (H)     pH Urine (no units)   Date Value   08/14/2022 6.0     Protein Albumin Urine (mg/dL)   Date Value   08/14/2022 100  (A)     Urobilinogen Urine (no units)   Date Value    11/26/2019 <2.0 E.U./dL     Nitrite Urine (no units)   Date Value   08/14/2022 Negative     Leukocyte Esterase Urine (no units)   Date Value   08/14/2022 500 Sadie/uL (A)       Urine culture: pending    Blood culture: pending    Lab Results: personally reviewed     Imaging:  EXAM: CTA ABDOMEN PELVIS WITH CONTRAST  LOCATION: [facility]  DATE/TIME: 8/14/2022 5:02 AM     FINDINGS:             AORTO-ILIAC: No intramural hematoma. No dissection or aneurysm. No significant focal stenosis or branch vessel occlusion.     There is classic hepatic arterial anatomy.      There are single bilateral renal arteries.     LOWER CHEST: Several small noncalcified pulmonary nodules, largest measuring 5 mm in the right lower lobe, axial image 107. Mild dependent atelectasis.     Moderate cardiomegaly. Atheromatous calcified location of coronary arteries and the visualized descending thoracic aorta.     HEPATOBILIARY: Liver enhances normally and is normal in size.     Cholecystectomy.     No intrahepatic or intrahepatic biliary ductal dilatation.           PANCREAS: Enhances normally. No peripancreatic inflammatory fat stranding.     SPLEEN: Enhances normally. Normal size.     ADRENAL GLANDS: Normal.     KIDNEYS: Progressive left renal cortical atrophy and chronic left renal pelvis and left ureteral dilatation with diffuse urothelial thickening and asymmetric left renal enhancement. No discrete obstructing stone or mass lesion is not identified, though   assessment within the pelvis is limited due to extensive beam hardening and streak artifact from bilateral hip arthroplasties. This appearance is similar to 2016 and may reflect sequela of previous obstruction.     Multifocal bilateral renal cortical scarring. There is a small nonobstructing right renal stone.           There is gas within the urinary bladder; the absence of any recent instrumentation, infection cannot be excluded. Correlation with urinalysis  recommended.     PELVIC ORGANS: Uterus is surgically absent.     BOWEL: No evidence of acute gastrointestinal inflammation or obstruction.      Colonic diverticulosis. No evidence of active gastrointestinal bleeding. Normal appendix.     No intraperitoneal free fluid or free air.     LYMPH NODES: No suspicious abdominal or pelvic lymphadenopathy.     VASCULATURE: See arterial findings, as detailed above. Mild to moderate atheromatous disease.     MUSCULOSKELETAL: Bilateral sacroiliac joint erosions, which are age indeterminate. Instrumented posterior spinal fusion from L3 to L5.     OTHER: No additionally significant abnormalities.                                                                      IMPRESSION:   1.  No CT evidence of acute gastrointestinal bleeding.  2.  Gas within the urinary bladder. In the absence of any recent instrumentation, this may reflect underlying cystitis. Correlation urinalysis recommended.  3.  Capacious appearance of the left renal pelvis and left ureter, similar to prior and likely a chronic finding. Discrete obstructing stone or mass lesion is not identified. Of note, the distal left ureter is difficult to assess, due to extensive beam   hardening and streak artifact from bilateral hip arthroplasties.  4.  Age-indeterminate, bilateral sacroiliitis.  5.  Diverticulosis.  6.  Small, nonobstructing right renal stone.  7.  Small noncalcified pulmonary nodules, measuring no greater than 5 mm. Follow-up is recommended according to Fleischner criteria, as detailed below.    I have personally reviewed the imaging reports above.     Assessment / Plan : Bessy Sevilla is being seen by [facility] for MELONY, gas in bladder, L hydroureteronephrosis    - 88-year-old female on chronic anticoagulation is admitted with suspected GI bleed, acute blood loss anemia. Hemoglobin drop 10.6-->9.2 in the ED, hgb now 10 without transfusion. No sign of recurrent GI bleed here. GI consulted and  recommending consersative measures at this time.    - Patient underwent cystolitholapaxy and L ureteral stent placement 8/2021 with  urology. Ureteral stent removed 9/2021. Repeat CT (10/21) showed worsening L hydroureteronephrosis with possible distal ureteral stricture. No further measures taken then.   - CT today shows chronic left hydroureteronephrosis and renal atrophy. No discrete obstructing stone or mass lesion identified, though pelvic ureteral assessment is limited due to streak artifact from hip arthroplasties. Small nonobstructing R renal stone.  - CT also showing gas in the bladder, no recent instrumentation. UA infectious, UC/BC pending. Patient with emphysematous cystitis. Please place alex catheter for optimal drainage.  - Recommend urgent L renal decompression given hydro, UTI, MELONY (creat 2.28, now 1.89), WBC of 20. Plans for cystoscopy, L ureteral stent placement, L retrograde pyelogram at 5p this evening with Dr. Viveros. Continue NPO status.  - Agree with broad spectrum abx, appreciate primary team to follow UC/BC and adjust abx as needed.   - Urology will continue to follow.    This case was discussed with:  Dr Hilario Viveros     Thank you for consulting [facility] regarding this patient's care. Please contact us with questions or concerns.     Corazon Dawkins PA-C  [facility]   [phone]

## 2022-08-14 NOTE — PHARMACY-ADMISSION MEDICATION HISTORY
Pharmacy Note - Admission Medication History    Pertinent Provider Information: none     ______________________________________________________________________    Prior To Admission (PTA) med list completed and updated in EMR.       PTA Med List   Medication Sig Last Dose     acetaminophen (TYLENOL) 500 MG tablet Take 1,000 mg by mouth 3 times daily as needed for mild pain Unknown at Unknown time     albuterol (PROVENTIL HFA;VENTOLIN HFA) 90 mcg/actuation inhaler Inhale 2 puffs into the lungs every 4 hours as needed Unknown at Unknown time     ascorbic acid, vitamin C, (VITAMIN C) 500 MG tablet Take 1,000 mg by mouth daily 8/13/2022 at am     atorvastatin (LIPITOR) 20 MG tablet Take 20 mg by mouth At Bedtime 8/13/2022 at Unknown time     beclomethasone HFA (QVAR REDIHALER) 80 MCG/ACT inhaler Inhale 2 puffs into the lungs 2 times daily 8/13/2022 at Unknown time     benzonatate (TESSALON) 100 MG capsule Take 100 mg by mouth 3 times daily as needed for cough Unknown at Unknown time     cetirizine (ZYRTEC) 5 MG tablet Take 5 mg by mouth daily 8/13/2022 at am     clotrimazole (LOTRIMIN) 1 % external cream Apply topically 2 times daily To rash on left arm until healed Unknown at Unknown time     cyanocobalamin 500 MCG tablet Take 1 tablet by mouth daily 8/13/2022 at am     DULoxetine (CYMBALTA) 60 MG capsule [DULOXETINE (CYMBALTA) 60 MG CAPSULE] Take 60 mg by mouth daily. 8/13/2022 at am     emollient (VANICREAM) external cream Apply topically daily To right lower extremity 8/13/2022 at am     estradiol (ESTRACE) 0.1 MG/GM vaginal cream Place 1 g vaginally three times a week Unknown at Unknown time     hydrocortisone (CORTAID) 1 % external cream Apply topically 2 times daily as needed Apply to rash on arm as needed Unknown at Unknown time     ipratropium-albuterol (COMBIVENT RESPIMAT)  MCG/ACT inhaler Inhale 1 puff into the lungs 4 times daily as needed for shortness of breath / dyspnea or wheezing Unknown at  Unknown time     loperamide (IMODIUM) 2 MG capsule Take 2 mg by mouth 3 times daily as needed for diarrhea Unknown at Unknown time     losartan (COZAAR) 100 MG tablet Take 100 mg by mouth daily 8/13/2022 at am     Menthol, Topical Analgesic, (BIOFREEZE) 4 % GEL Externally apply 1 Application topically 2 times daily To the right side of the neck 8/13/2022 at Unknown time     nitroGLYcerin (NITROSTAT) 0.4 MG sublingual tablet Place 0.4 mg under the tongue every 5 minutes as needed for chest pain For chest pain place 1 tablet under the tongue every 5 minutes for 3 doses. If symptoms persist 5 minutes after 1st dose call 911. Unknown at Unknown time     nystatin (MYCOSTATIN) 679901 UNIT/GM external powder Apply topically daily Apply powder to reddened area on abdominal folds and bilateral breasts until redness has gone Unknown at Unknown time     pyridOXINE (VITAMIN B6) 100 MG TABS Take 200 mg by mouth daily 8/13/2022 at am     rivaroxaban ANTICOAGULANT (XARELTO) 15 MG TABS tablet Take 15 mg by mouth daily 8/13/2022 at am     senna-docusate (SENOKOT-S/PERICOLACE) 8.6-50 MG tablet Take 1 tablet by mouth At Bedtime 8/13/2022 at Unknown time     senna-docusate (SENOKOT-S/PERICOLACE) 8.6-50 MG tablet Take 1 tablet by mouth 2 times daily as needed for constipation Unknown at Unknown time     tiotropium (SPIRIVA RESPIMAT) 2.5 MCG/ACT inhaler Inhale 2 puffs into the lungs daily 8/13/2022 at am     traZODone (DESYREL) 50 MG tablet Take 75 mg by mouth At Bedtime 8/13/2022 at Unknown time     vitamin D3 (CHOLECALCIFEROL) 50 mcg (2000 units) tablet Take 2 tablets by mouth daily 8/13/2022 at am       Information source(s): Patient and CareEverywhere/SureScripts  Method of interview communication: in-person    Patient was asked about OTC/herbal products specifically.  PTA med list reflects this.    In the past week, patient estimated taking medication this percent of the time:  greater than 90%.    Allergies were reviewed, assessed,  and updated with the patient.      Patient did not bring any medications to the hospital and can't retrieve from home. No multi-dose medications are available for use during hospital stay.     The information provided in this note is only as accurate as the sources available at the time of the update(s).    Thank you for the opportunity to participate in the care of this patient.    Adwoa Holliday RPH  8/14/2022 11:21 AM

## 2022-08-14 NOTE — ED NOTES
"[facility] ED Handoff Report    ED Chief Complaint: Bloody stool    ED Diagnosis:  (N43.3) Left hydrocele  (primary encounter diagnosis)  Comment:  Plan:     (K92.2) Gastrointestinal hemorrhage, unspecified gastrointestinal hemorrhage type  Comment:   Plan: Monitor hgb    (N13.30) Hydronephrosis of left kidney  Comment:Consult  Plan: Case Request: CYSTOSCOPY, WITH RETROGRADE         PYELOGRAM AND URETERAL STENT REPLACEMENT              PMH:  History reviewed. No pertinent past medical history.     Code Status:  Full Code     Falls Risk: Yes Band: Applied    Current Living Situation/Residence: lives in an assisted living facility     Elimination Status: Continent: indwelling catheter     Activity Level: 2 assist    Patients Preferred Language:  English     Needed: No    Vital Signs:  BP (!) 142/77   Pulse 95   Temp 97.7  F (36.5  C) (Oral)   Resp 23   Ht 1.702 m (5' 7\")   Wt 120.7 kg (266 lb)   SpO2 100%   BMI 41.66 kg/m       Cardiac Rhythm: NSR/ST    Pain Score: 0/10    Is the Patient Confused:  Yes    Last Food or Drink: 08/14/22 at lunch    Focused Assessment:  Pt was found with burgandy stool while Assisted living staff did rounds. They were unable to get a BP. Here for eval. Incidentally found \"air in bladder.\" Plan for Cysto. Pt is confused but does not attempt to climb out of bed. Very [place]    Tests Performed: Done: Labs and Imaging    Treatments Provided:      Family Dynamics/Concerns: No    Family Updated On Visitor Policy: No    Plan of Care Communicated to Family: No    Who Was Updated about Plan of Care: attempted to reach family    Belongings Checklist Done and Signed by Patient: Yes    Medications sent with patient: yes    Covid: asymptomatic , negative    Additional Information:     RN: Agatha Perez RN   8/14/2022 12:37 PM         "

## 2022-08-14 NOTE — H&P
ADMISSION HISTORY & PHYSICAL    CODE STATUS:  Full Code    Clementine Watts, None    Patient YOB: 1934  Admit date: 8/14/2022  Date of Service: 8/14/2022    ASSESSMENT AND PLAN:  88 year old female with past medical history of A. fib on Xarelto, CVA, LANRE, HTN who resides in assisted living facility, when staff came to check on her, found her in the bed with dark red blood saturating bedding and brought to ED for evaluation, found to have drop in hemoglobin, significant increase in creatinine compared to before and admitted for further management.    Acute GI bleeding in the setting of anticoagulation;  Acute blood loss anemia due to GI bleed;  -- Per history, assisted living facility staff found patient in her bed with dark red blood saturating bedding   On admission hemoglobin 10.6, dropped to 9.2 after IV fluid bolus.  No recurrence of bleeding since brought to ED/hospital.  --Per previous hospitalization record from 8/2016, reported history of hemorrhoidal bleeding  --Previous hemoglobin in 11 range per records  --On admission CTA abdomen pelvis with no evidence of active GI bleeding  -- Serial hemoglobin, transfuse if hemoglobin less than 7 or hemodynamically unstable  --Hold home Xarelto for now and resume when okay with GI team  --N.p.o., IV fluid, PPI.  Defer to GI when okay to resume oral diet  -- Appreciated GI input    Addendum;  -- Finally able to get hold of family members, discussed with patient's daughter Rani, reported she is a decision-maker, reported she visited her mother about 3 days ago and no complaints, denied past history of GI bleeding, does not recall when was the last time patient had a colonoscopy.  Denied recent febrile illness, GI//respiratory symptoms.  However, reported issues with kidney stone and was in process of getting removal as an outpatient.  Also reported patient is wheelchair-bound at baseline.  Discussed ongoing medical issues, management.  Patient's  daughter gave consent for blood transfusion just in case.  She also confirmed CODE STATUS full code.  Discussed with ED charge nurse.    Addendum at 2:40 PM;  -- Discussed with patient's nurse, reported 2 episodes of bloody stool however, reported no complaints of abdominal pain and hemodynamically stable.  -- Recheck hemoglobin at noon was 10 which is higher than previous hemoglobin at 6.  Check a stat hemoglobin now, will discuss with GI with results    Addendum at 3:40 PM;  -- Recheck hemoglobin 9.5.  Patient seen and examined again, patient looks significantly better than this morning, alert and awake following simple commands appropriately, denied abdominal pain, short of breath or chest pain  -- Despite aggressive hydration, hemoglobin fairly stable and CTA on admission negative for acute GI bleeding, IR angiogram likely unable to find bleeding rather will get more contrast and given creatinine around 2 range, already had contrast this morning concern for more risks than benefit.  --Concern for hemorrhoidal bleeding given history of hemorrhoids and now fresh blood but not much drop in hgb.  Ordered Anusol-HC suppository  --Consider IR consult if significant bleeding and/or drop in hemoglobin.  Consider Kcentra also at that time given patient was on Xarelto prior to admission.  --Discussed with GI, Dr. Jurado and agreed with above plan of care  -- Discussed with nursing staffs.    Acute kidney injury on CKD stage III;  Metabolic acidosis; likely due to MELONY and GI bleed, improving  Mild hyperkalemia; resolved  Abnormal CT with gas within the bladder, concern for cystitis and also some abnormal finding on left renal pelvis and left ureter  --Check UA/UC  -- Given significant leukocytosis and CT findings, will start on IV Zosyn and urology consulted  -- Creatinine improving with IV hydration, continue IV fluid   -- Avoid nephrotoxic medications   -- Monitor labs, intake/output, weight closely  Addendum;  --  Appreciate urology input, reported possibly placement to cystitis, plan for left ureteral stent placement.  -- Add vancomycin.  De-escalate antibiotics with UC and BC results    Essential hypertension;  -- Reported difficult to find blood pressure by EMS but upon arrival to ED blood pressure 128/78, after that had 1 episode of low BP, better with 500 mL IV fluid bolus per ED provider  -- Currently blood pressure running high  -- Home blood pressure medications with holding parameter once reconciled by pharmacy    History of PAF;  -- Heart rate controlled.    --Home Xarelto on hold due to GI bleed    History of CVA with left-sided weakness, wheelchair-bound per previous medical record;  -- Reviewed previous medical records from 8/2016.  --Xarelto on hold due to GI bleeding  -- PT OT consult when medically stable    Hx of COPD; not on exacerbation; home inhalers. IS    GI prophylaxis; IV PPI  DVT prophylaxis; SCDs, no pharmacological agent given GI bleeding      Disposition:  -Anticipated Length of Stay in midnights and medical necessity (including a midnight in the Emergency Department after triage if applicable): >2      CHIEF COMPLAINT:  GI bleeding    HISTORY OF PRESENTING ILLNESS:  Patient hard of hearing and seems has underlying dementia and cannot provide reliable history.  History obtained from ED provider, previous medical records.    Patient is a 88 year old female with PMH significant for A. fib on Xarelto, CVA hyperlipidemia, GERD, CKD, HTN who resides in assisted living facility, when staff came to check on her, found her in the bed with dark red blood saturating bedding and brought to ED for evaluation, found to have drop in hemoglobin, significant increase in creatinine compared to before and admitted for further management.    As per available information, patient resides on assisted living facility, on morning of admission, assisted living facility found patient with her bleeding completely saturated  with dark red blood.  Also reported that patient normally alert and oriented and able to self ambulate but seemed more confused.  Per ED note, EMS was unable to get IV access, blood pressure, radial pulse was weak and heart rate in 60s, saturation 96% on room air.  Per ED provider note, upon arrival to ED, reported blood pressure 128/70, patient appeared relatively alert and following simple commands and denied abdominal pain.    Patient seen and examined in ED room 1, during my visit, patient looked comfortable and sleeping, easily arousable but seems very hard of hearing.  Patient does reports she is in the hospital but not sure why she is in the hospital.  Patient only complaint is feeling cold and requesting more blanket otherwise no other complaints and no complaint of pain in any part of her body.    Discussed with patient's nurse at bedside and reported very hard of hearing.    In ED, CTA abdomen and pelvis negative for active bleeding.  Hemoglobin 10.6, did receive some IV fluid and recheck 9.2.  Also found to have significant increase in her creatinine compared to a few months ago, elevated WBC count, abnormal UA and CT reported abnormal findings on her kidneys.    I did call numbers listed on emergency contact, [phone] and asked for Mr Gil-female person picked up the phone and reported wrong number.  I also called other #[phone] but nobody picked up the phone.  Discussed with patient's nurse, reported no family members contacted yet.  Care manager consult placed to help with getting emergency  name and number      PMH/PSH:  Patient Active Problem List   Diagnosis     Acute cholecystitis     Essential hypertension     Dyslipidemia     Obstructive lung disease (H)     Overactive bladder     S/P laparoscopic cholecystectomy     Volume overload     Acute postoperative respiratory failure (H)     Elevated LFTs     Infection due to Enterobacter cloacae     UTI (lower urinary tract  infection)     Leukocytosis     Atrial fibrillation (H)     MELONY (acute kidney injury) (H)     Abdominal abscess     Allergic rhinitis     Hemorrhoid     Asthma     Gastrointestinal hemorrhage, unspecified gastrointestinal hemorrhage type       History reviewed. No pertinent past medical history.     Past Surgical History:   Procedure Laterality Date     IR ABSCESS DRAIN INSERTION  7/30/2016     LAPAROSCOPIC CHOLECYSTECTOMY  07/19/2016     LAPAROSCOPIC CHOLECYSTECTOMY N/A 7/18/2016    Procedure: CHOLECYSTECTOMY LAPAROSCOPIC;  Surgeon: Brianna Cast MD;  Location: [facility];  Service:      SPINAL FUSION            ALLERGIES:  Allergies   Allergen Reactions     Sulfa (Sulfonamide Antibiotics) [Sulfa Drugs] Swelling       MEDICATIONS:  Reviewed.  Current Facility-Administered Medications   Medication     alum & mag hydroxide-simethicone (MAALOX) suspension 30 mL     melatonin tablet 3 mg     ondansetron (ZOFRAN ODT) ODT tab 4 mg    Or     ondansetron (ZOFRAN) injection 4 mg     Patient is already receiving anticoagulation with heparin, enoxaparin (LOVENOX), warfarin (COUMADIN)  or other anticoagulant medication     sodium chloride 0.9% infusion     Current Outpatient Medications   Medication     albuterol (PROVENTIL HFA;VENTOLIN HFA) 90 mcg/actuation inhaler     amLODIPine (NORVASC) 5 MG tablet     ascorbic acid, vitamin C, (VITAMIN C) 500 MG tablet     atorvastatin (LIPITOR) 20 MG tablet     cranberry 400 mg cap     cyanocobalamin 500 MCG tablet     doxycycline hyclate (VIBRA-TABS) 100 MG tablet     DULoxetine (CYMBALTA) 60 MG capsule     GREEN TEA LEAF & TEA LEAF EXTR (GREEN TEA COMPLEX ORAL)     lysine 500 mg Tab     mirabegron (MYRBETRIQ) 50 mg Tb24 ER tablet     omeprazole (PRILOSEC) 20 MG capsule     pyridoxine, vitamin B6, (B-6) 25 MG tablet     rivaroxaban 20 mg Tab     traZODone (DESYREL) 50 MG tablet     UNABLE TO FIND     UNABLE TO FIND     vitamin E 400 UNIT capsule       Current  Facility-Administered Medications:      alum & mag hydroxide-simethicone (MAALOX) suspension 30 mL, 30 mL, Oral, Q4H PRN, Tim VALLADARES MD     melatonin tablet 3 mg, 3 mg, Oral, At Bedtime PRN, Tim VALLADARES MD     ondansetron (ZOFRAN ODT) ODT tab 4 mg, 4 mg, Oral, Q6H PRN **OR** ondansetron (ZOFRAN) injection 4 mg, 4 mg, Intravenous, Q6H PRN, Tim VALLADARES MD     Patient is already receiving anticoagulation with heparin, enoxaparin (LOVENOX), warfarin (COUMADIN)  or other anticoagulant medication, , Does not apply, Continuous PRN, Tim VALLADARES MD     sodium chloride 0.9% infusion, , Intravenous, Continuous, Tim VALLADARES MD, Last Rate: 125 mL/hr at 08/14/22 0949, New Bag at 08/14/22 0949    Current Outpatient Medications:      albuterol (PROVENTIL HFA;VENTOLIN HFA) 90 mcg/actuation inhaler, [ALBUTEROL (PROVENTIL HFA;VENTOLIN HFA) 90 MCG/ACTUATION INHALER] Inhale 2 puffs 2 (two) times a day., Disp: , Rfl:      amLODIPine (NORVASC) 5 MG tablet, [AMLODIPINE (NORVASC) 5 MG TABLET] Take 5 mg by mouth every morning., Disp: , Rfl:      ascorbic acid, vitamin C, (VITAMIN C) 500 MG tablet, [ASCORBIC ACID, VITAMIN C, (VITAMIN C) 500 MG TABLET] Take by mouth 2 (two) times a day., Disp: , Rfl:      atorvastatin (LIPITOR) 20 MG tablet, Take 20 mg by mouth At Bedtime, Disp: , Rfl:      cranberry 400 mg cap, [CRANBERRY 400 MG CAP] Take by mouth 2 (two) times a day., Disp: , Rfl:      cyanocobalamin 500 MCG tablet, [CYANOCOBALAMIN 500 MCG TABLET] Take by mouth daily., Disp: , Rfl:      doxycycline hyclate (VIBRA-TABS) 100 MG tablet, Take 1 tablet (100 mg) by mouth 2 times daily, Disp: 20 tablet, Rfl: 0     DULoxetine (CYMBALTA) 60 MG capsule, [DULOXETINE (CYMBALTA) 60 MG CAPSULE] Take 60 mg by mouth daily., Disp: , Rfl:      GREEN TEA LEAF & TEA LEAF EXTR (GREEN TEA COMPLEX ORAL), [GREEN TEA LEAF & TEA LEAF EXTR (GREEN TEA COMPLEX ORAL)] Take by mouth daily., Disp: , Rfl:      lysine 500 mg Tab, [LYSINE 500 MG TAB] Take by mouth every morning.,  Disp: , Rfl:      mirabegron (MYRBETRIQ) 50 mg Tb24 ER tablet, [MIRABEGRON (MYRBETRIQ) 50 MG TB24 ER TABLET] Take 50 mg by mouth daily., Disp: , Rfl:      omeprazole (PRILOSEC) 20 MG capsule, [OMEPRAZOLE (PRILOSEC) 20 MG CAPSULE] Take 20 mg by mouth 2 (two) times a day before meals. Before breakfast and supper, Disp: , Rfl:      pyridoxine, vitamin B6, (B-6) 25 MG tablet, [PYRIDOXINE, VITAMIN B6, (B-6) 25 MG TABLET] Take by mouth daily. Pt is not sure of strength; MD-please choose strength if you order for pt here, Disp: , Rfl:      rivaroxaban 20 mg Tab, [RIVAROXABAN 20 MG TAB] Take 20 mg by mouth daily with supper., Disp: , Rfl:      traZODone (DESYREL) 50 MG tablet, [TRAZODONE (DESYREL) 50 MG TABLET] Take 50 mg by mouth bedtime as needed for sleep., Disp: , Rfl:      UNABLE TO FIND, [UNABLE TO FIND] Take by mouth daily. Med Name vitamin A and D:, Disp: , Rfl:      UNABLE TO FIND, [UNABLE TO FIND] daily. Med Name:, Disp: , Rfl:      vitamin E 400 UNIT capsule, [VITAMIN E 400 UNIT CAPSULE] Take 400 Units by mouth daily., Disp: , Rfl:    Scheduled Meds:  Continuous Infusions:    - MEDICATION INSTRUCTIONS -       sodium chloride 125 mL/hr at 08/14/22 0949     PRN Meds:.    SOCIAL HISTORY:  Social History     Socioeconomic History     Marital status: Single     Spouse name: Not on file     Number of children: Not on file     Years of education: Not on file     Highest education level: Not on file   Occupational History     Not on file   Tobacco Use     Smoking status: Never Smoker     Smokeless tobacco: Never Used   Substance and Sexual Activity     Alcohol use: No     Drug use: No     Sexual activity: Not on file   Other Topics Concern     Not on file   Social History Narrative     Not on file     Social Determinants of Health     Financial Resource Strain: Not on file   Food Insecurity: Not on file   Transportation Needs: Not on file   Physical Activity: Not on file   Stress: Not on file   Social Connections: Not on  "file   Intimate Partner Violence: Not on file   Housing Stability: Not on file       FAMILY HISTORY:  Patient cannot provide family history due to very hard of hearing and seems has underlying dementia    ROS:  Patient cannot provide detailed ROS given very hard of hearing and seems has underlying dementia    PHYSICAL EXAM:  GENRL:  Not in acute distress  BP (!) 142/77   Pulse 95   Temp 97.7  F (36.5  C) (Oral)   Resp 23   Ht 1.702 m (5' 7\")   Wt 120.7 kg (266 lb)   SpO2 100%   BMI 41.66 kg/m    No intake/output data recorded.  No intake/output data recorded.  HEENT: NC/AT      Eyes-  Pupil round and reactive to light bilaterally      Neck- supple, no JVP elevation,       Sclera- anicteric      Oropharynx- dry and pink  CHEST: Clear to auscultation bilateral anteriorly, no ronchi or wheezing  HEART: S1S2 normal, regular.   ABDMN: Soft.  No tenderness appreciated.  No guarding or rigidity. Bowel sounds- active  EXTRM: + pedal edema  INTGM: No skin rash, no cyanosis or clubbing  MUSCULOSKELETAL: no joint tenderness or swelling on upper and lower extremities  NEURO: Sleeping, easily arousable, try to follow simple commands but very hard of hearing and giving different answers, spontaneously moving both upper extremities, did bend her knees when trickling her sole   PSYCH: Calm and cooperative    DIAGNOSTIC DATA:    Recent Labs   Lab 08/14/22  0631 08/14/22 0331   WBC  --  20.6*   HGB 9.2* 10.6*   HCT  --  33.6*   PLT  --  396       Recent Labs   Lab 08/14/22  0721 08/14/22 0331    138   CO2 18* 14*   BUN 38* 37*       Recent Labs   Lab 08/14/22 0331   INR 2.04*       Recent Labs   Lab 08/14/22  0721 08/14/22 0331    138   CO2 18* 14*   BUN 38* 37*   ALBUMIN  --  3.1*   ALKPHOS  --  114   ALT  --  51*   AST  --  110*       CTA Abdomen Pelvis with Contrast    Result Date: 8/14/2022  EXAM: CTA ABDOMEN PELVIS WITH CONTRAST LOCATION: [facility] DATE/TIME: 8/14/2022 5:02 AM " INDICATION: Gastrointestinal bleeding COMPARISON: None. TECHNIQUE: CT angiogram abdomen pelvis during arterial phase of injection of IV contrast. 2D and 3D MIP reconstructions were performed by the CT technologist. Dose reduction techniques were used. CONTRAST: 5 mL Isovue 370 FINDINGS:  AORTO-ILIAC: No intramural hematoma. No dissection or aneurysm. No significant focal stenosis or branch vessel occlusion. There is classic hepatic arterial anatomy. There are single bilateral renal arteries. LOWER CHEST: Several small noncalcified pulmonary nodules, largest measuring 5 mm in the right lower lobe, axial image 107. Mild dependent atelectasis. Moderate cardiomegaly. Atheromatous calcified location of coronary arteries and the visualized descending thoracic aorta. HEPATOBILIARY: Liver enhances normally and is normal in size. Cholecystectomy. No intrahepatic or intrahepatic biliary ductal dilatation. PANCREAS: Enhances normally. No peripancreatic inflammatory fat stranding. SPLEEN: Enhances normally. Normal size. ADRENAL GLANDS: Normal. KIDNEYS: Progressive left renal cortical atrophy and chronic left renal pelvis and left ureteral dilatation with diffuse urothelial thickening and asymmetric left renal enhancement. No discrete obstructing stone or mass lesion is not identified, though assessment within the pelvis is limited due to extensive beam hardening and streak artifact from bilateral hip arthroplasties. This appearance is similar to 2016 and may reflect sequela of previous obstruction. Multifocal bilateral renal cortical scarring. There is a small nonobstructing right renal stone. There is gas within the urinary bladder; the absence of any recent instrumentation, infection cannot be excluded. Correlation with urinalysis recommended. PELVIC ORGANS: Uterus is surgically absent. BOWEL: No evidence of acute gastrointestinal inflammation or obstruction. Colonic diverticulosis. No evidence of active gastrointestinal  bleeding. Normal appendix. No intraperitoneal free fluid or free air. LYMPH NODES: No suspicious abdominal or pelvic lymphadenopathy. VASCULATURE: See arterial findings, as detailed above. Mild to moderate atheromatous disease. MUSCULOSKELETAL: Bilateral sacroiliac joint erosions, which are age indeterminate. Instrumented posterior spinal fusion from L3 to L5. OTHER: No additionally significant abnormalities.     IMPRESSION: 1.  No CT evidence of acute gastrointestinal bleeding. 2.  Gas within the urinary bladder. In the absence of any recent instrumentation, this may reflect underlying cystitis. Correlation urinalysis recommended. 3.  Capacious appearance of the left renal pelvis and left ureter, similar to prior and likely a chronic finding. Discrete obstructing stone or mass lesion is not identified. Of note, the distal left ureter is difficult to assess, due to extensive beam hardening and streak artifact from bilateral hip arthroplasties. 4.  Age-indeterminate, bilateral sacroiliitis. 5.  Diverticulosis. 6.  Small, nonobstructing right renal stone. 7.  Small noncalcified pulmonary nodules, measuring no greater than 5 mm. Follow-up is recommended according to Fleischner criteria, as detailed below. Fleischner Society Recommendations for Pulmonary Nodules Nodule size less than 6 mm: Single or multiple nodules: Nodules < 6 mm do not require routine follow-up, but certain patients at high risk with suspicious nodule morphology, upper lobe location, or both may warrant 12-month follow-up.      Patient's new lab studies reviewed personally.  Patient's new radiology reports reviewed personally.  I personally viewed and personally interpreted patient's EKG: Poor quality EKG, she in sinus rhythm, bedside monitor with sinus rhythm    Note created using dragon voice recognition software.  Errors in spelling or words which seems out of context are unintentional.  Sounds alike errors may have escaped editing.      08/14/2022  FLOR FERNANDEZ MD  HOSPITALIST, [facility]  PAGER NO. [phone]

## 2022-08-14 NOTE — PROGRESS NOTES
"Primary RN asked CM to find family contact for MD. Pt lives at the [facility] in assisted living, dtr Rani states that son Jeffery is  and she has POA/HCD, she just found out 10 minutes that pt is here from assisted lvg facility. Rani saw Bessy on Thursday and noticed she was declining and her thoughts were all over the place and was \"flightly\".     Dr VALLADARES was paged w/phone number for Rani.  Registration notified to remove phone # for Jeffery.   "

## 2022-08-14 NOTE — ANESTHESIA CARE TRANSFER NOTE
Patient: Bessy Sevilla    Procedure: Procedure(s):  CYSTOSCOPY, WITH RETROGRADE PYELOGRAM AND URETERAL STENT REPLACEMENT       Diagnosis: Hydronephrosis of left kidney [N13.30]  Diagnosis Additional Information: No value filed.    Anesthesia Type:   MAC     Note:    Oropharynx: spontaneously breathing  Level of Consciousness: awake  Oxygen Supplementation: nasal cannula  Level of Supplemental Oxygen (L/min / FiO2): 2  Independent Airway: airway patency satisfactory and stable  Dentition: dentition unchanged  Vital Signs Stable: post-procedure vital signs reviewed and stable  Report to RN Given: handoff report given  Patient transferred to: Medical/Surgical Unit  Comments: Pt. Pink and breathing spontaneously.  Vitals stable.  Report given to oncoming nurse.  Transfer of care occurred.   Handoff Report: Identifed the Patient, Identified the Reponsible Provider, Reviewed the pertinent medical history, Discussed the surgical course, Reviewed Intra-OP anesthesia mangement and issues during anesthesia, Set expectations for post-procedure period and Allowed opportunity for questions and acknowledgement of understanding      Vitals:  Vitals Value Taken Time   /70    Temp 98.6    Pulse 97 08/14/22 1807   Resp 23 08/14/22 1806   SpO2 83 % 08/14/22 1804   Vitals shown include unvalidated device data.    Electronically Signed By: ARMEN Fermin CRNA  August 14, 2022  6:09 PM

## 2022-08-14 NOTE — CONSULTS
[facility] Gastroenterology Consultation    Bessy Sevilla MRN# [id number]   Age: 88 year old YOB: 1934     Date of Admission:  8/14/2022    Reason for consult: Lower GI bleed       Requesting physician: Tim       Level of consult: Consult, follow and place orders           Assessment and Plan:   Assessment:   Lower GI bleed: This is a confused movement of recent assisted living and found to have dark red blood in her bed this morning.  Apparently she was hypotensive when found.  She had revived by the time she got to the ER.  She has dropped her hemoglobin from 11 earlier this year down to 9.2.  She was 10.6 on admission.  White count is elevated but she has air within the bladder suggesting cystitis.  That is being looked at by urology.  She has not had further bleeding since admission.  She is anticoagulated with an INR of 2.25.  I do not see that we have ever performed a colonoscopy on her.  She is unable to tell me if she has had 1.  She does have diverticuli per the CT scan.  She denies any abdominal or rectal pain.  No shortness of breath.  Denies chest pain.  Her answers are somewhat confused however.    Assessment likely diverticular bleed, but outlet bleeding from hemorrhoids are fissure possible.  Given the volume I suspect more likely this is diverticular bleed.      Plan:   I would observe at this point time.  If she has a rebleed consider tagged red cell scan or CTA with IR involvement.  When we can get a hold of family member and find out if she has had a colonoscopy.  If not we would consider that.  It appears she will be admitted for her cystitis and the bleed.             Chief Complaint:   Lower GI bleed     History is obtained from the patient  History of some the assisted living and the ER note.  The patient is confused and answers questions differently each time mask.    -           Past Medical History:   I have reviewed this patient's past medical history           Past Surgical History:   I have reviewed this patient's past surgical history          Social History:   I have reviewed this patient's social history          Family History:   I have reviewed this patient's family history          Immunizations:   Immunizations are current          Allergies:   All allergies reviewed and addressed          Medications:     Current Facility-Administered Medications   Medication     alum & mag hydroxide-simethicone (MAALOX) suspension 30 mL     melatonin tablet 3 mg     ondansetron (ZOFRAN ODT) ODT tab 4 mg    Or     ondansetron (ZOFRAN) injection 4 mg     Patient is already receiving anticoagulation with heparin, enoxaparin (LOVENOX), warfarin (COUMADIN)  or other anticoagulant medication     sodium chloride 0.9% infusion     Current Outpatient Medications   Medication Sig     albuterol (PROVENTIL HFA;VENTOLIN HFA) 90 mcg/actuation inhaler [ALBUTEROL (PROVENTIL HFA;VENTOLIN HFA) 90 MCG/ACTUATION INHALER] Inhale 2 puffs 2 (two) times a day.     amLODIPine (NORVASC) 5 MG tablet [AMLODIPINE (NORVASC) 5 MG TABLET] Take 5 mg by mouth every morning.     ascorbic acid, vitamin C, (VITAMIN C) 500 MG tablet [ASCORBIC ACID, VITAMIN C, (VITAMIN C) 500 MG TABLET] Take by mouth 2 (two) times a day.     atorvastatin (LIPITOR) 20 MG tablet [ATORVASTATIN (LIPITOR) 20 MG TABLET] Take 20 mg by mouth daily.     cetirizine (ZYRTEC) 10 MG tablet [CETIRIZINE (ZYRTEC) 10 MG TABLET] Take 1 tablet (10 mg total) by mouth daily.     cranberry 400 mg cap [CRANBERRY 400 MG CAP] Take by mouth 2 (two) times a day.     cyanocobalamin 500 MCG tablet [CYANOCOBALAMIN 500 MCG TABLET] Take by mouth daily.     doxycycline hyclate (VIBRA-TABS) 100 MG tablet Take 1 tablet (100 mg) by mouth 2 times daily     DULoxetine (CYMBALTA) 60 MG capsule [DULOXETINE (CYMBALTA) 60 MG CAPSULE] Take 60 mg by mouth daily.     GREEN TEA LEAF & TEA LEAF EXTR (GREEN TEA COMPLEX ORAL) [GREEN TEA LEAF & TEA LEAF EXTR (GREEN TEA  COMPLEX ORAL)] Take by mouth daily.     losartan (COZAAR) 50 MG tablet [LOSARTAN (COZAAR) 50 MG TABLET] Take 0.5 tablets (25 mg total) by mouth every evening.     lysine 500 mg Tab [LYSINE 500 MG TAB] Take by mouth every morning.     mirabegron (MYRBETRIQ) 50 mg Tb24 ER tablet [MIRABEGRON (MYRBETRIQ) 50 MG TB24 ER TABLET] Take 50 mg by mouth daily.     omeprazole (PRILOSEC) 20 MG capsule [OMEPRAZOLE (PRILOSEC) 20 MG CAPSULE] Take 20 mg by mouth 2 (two) times a day before meals. Before breakfast and supper     pyridoxine, vitamin B6, (B-6) 25 MG tablet [PYRIDOXINE, VITAMIN B6, (B-6) 25 MG TABLET] Take by mouth daily. Pt is not sure of strength; MD-please choose strength if you order for pt here     rivaroxaban 20 mg Tab [RIVAROXABAN 20 MG TAB] Take 20 mg by mouth daily with supper.     senna-docusate (PERICOLACE) 8.6-50 mg tablet [SENNA-DOCUSATE (PERICOLACE) 8.6-50 MG TABLET] Take 1 tablet by mouth 2 (two) times a day as needed for constipation.     tiotropium (SPIRIVA) 18 mcg inhalation capsule [TIOTROPIUM (SPIRIVA) 18 MCG INHALATION CAPSULE] Place 1 capsule (2 puffs total) into inhaler and inhale once daily. (Patient not taking: Reported on 4/27/2022)     traZODone (DESYREL) 50 MG tablet [TRAZODONE (DESYREL) 50 MG TABLET] Take 50 mg by mouth bedtime as needed for sleep.     UNABLE TO FIND [UNABLE TO FIND] Take by mouth daily. Med Name vitamin A and D:     UNABLE TO FIND [UNABLE TO FIND] daily. Med Name:     vitamin E 400 UNIT capsule [VITAMIN E 400 UNIT CAPSULE] Take 400 Units by mouth daily.             Review of Systems:   A comprehensive review of systems was performed and found to be negative except as described in this note  Unable to obtain reasonable review of systems.     -On exam she is confused and answers all questions with yes no.  Airway is intact.  HEENT is otherwise normal.  Chest is clear to auscultation.  Cardiovascular exam shows a regular rate and rhythm.  Abdomen is soft without tenderness to  palpation.  Bowel sounds are positive.  No peripheral edema.  She does have scattered ecchymoses.       Data:   All laboratory data reviewed  -  All imaging studies reviewed by me.    Attestation:  I have reviewed today's vital signs, notes, medications, labs and imaging.    Jaden Jurado MD

## 2022-08-14 NOTE — ED NOTES
Notified Dr. Dumont of pt having 2 govind blood stools. Advised to alert GI, Dr. Jurado. Spoke with Dr. Jurado who stated that it was an IR issue. No orders had been placed for IR to be involved. Spoke with Charge RN and called Dr. Dumont back. STAT Hgb ordered and Dr. Dumont will proceed from there.

## 2022-08-14 NOTE — OP NOTE
OPERATIVE REPORT    Place of Service:  [facility]    Pre-operative Diagnosis: left hydronephrosos    Post-operative Diagnosis: Same    Operative Date:8/14/2022    Procedure:   1.) cystoscopy  2.) left retrograde pyelogram  3.) left ureteral stent placement  4.) cystogram    Surgeon: Hilaroi Viveros    Anesthesia: General    Estimated Blood Loss:  <5 ml    Complications: None    Findings: Hydroureteronephrosis with narrowing at the distal 2 cm of ureter.  Bladder small capacity.  Left ureteral orifice is quite capacious.    I debated on whether to leave a stent at all.  It is unclear if the hydroureteronephrosis is from high pressure bladder storage or from narrowing of the distal ureter.  However given the appearance of the decompressed distal segment of ureter on CT scan and narrowing on retrograde pyelogram, despite the gaping ureteral orifice, I elected to leave a stent.    Summary of Procedure:    After informed consent was obtained the patient was brought back to the operating room.  The patient was prepped and draped in standard sterile fashion.  IV antibiotics were administered for prophylaxis.  A time out was performed.    The rigid cystoscope was introduced through the urethral meatus.  The bladder was completely inspected.  Findings are noted above.  The left UO was gaping.  A 5 Tajik open-ended catheter was easily advanced into the ureter.    A retrograde pyelogram was performed.  The findings are noted above.  I performed a cystogram but did not see any contrast go retrograde up the ureter.      After some deliberation I placed a Wikinvestson guidewire into the left renal pelvis and over this a 6 x 24 cm double-J left ureteral stent.  There was a good curl noted in the kidney and a good curl noted in the bladder.     The patient's bladder was drained with a Rajput catheter.  She tolerated the procedure well.  She was brought to the recovery room in stable condition      iHlario Viveros,  MD

## 2022-08-14 NOTE — ED NOTES
Patient cleaned up by ER staff, IV's placed, labs drawn and sent. Patient given warm blankets for comfort.

## 2022-08-14 NOTE — ED NOTES
"[facility] ED Handoff Report    ED Chief Complaint: Bloody stool    ED Diagnosis:  (N43.3) Left hydrocele  (primary encounter diagnosis)    (K92.2) Gastrointestinal hemorrhage, unspecified gastrointestinal hemorrhage type    (N13.30) Hydronephrosis of left kidney  Plan: Case Request: CYSTOSCOPY, WITH RETROGRADE         PYELOGRAM AND URETERAL STENT REPLACEMENT     PMH:  History reviewed. No pertinent past medical history.     Code Status:  Full Code     Falls Risk: Yes Band: Applied    Current Living Situation/Residence: [facility]     Elimination Status: Continent: bowel and bladder program     Activity Level: 2 assist    Patients Preferred Language:  English     Needed: No    Vital Signs:  BP (!) 150/69   Pulse 98   Temp 97.7  F (36.5  C) (Oral)   Resp 21   Ht 1.702 m (5' 7\")   Wt 120.7 kg (266 lb)   SpO2 99%   BMI 41.66 kg/m       Cardiac Rhythm: NSR/ST    Pain Score: 0/10    Is the Patient Confused:  Yes    Last Food or Drink: 08/14/22 at noon    Focused Assessment:  Improved alertness, able to follow commands and mostly follow conversation.  Still redness in stool.  [place].    Tests Performed: Done: Labs and Imaging    Treatments Provided:  2 units of blood    Covid: asymptomatic , negative    RN: Manasa Aparicio 8/14/2022 3:42 PM         "

## 2022-08-14 NOTE — CONSULTS
Brief Remote Urology Note    88-year-old female with a history of known L hydro, a fib on Xarelto who presented to the ER with dark red blood saturating her bedding.     In the ED, patient had a hemoglobin drop 10.6-->9.2 after IVFs. Concern for lower GI bleed. No sign of rebleeding in the ED. GI opting to monitor for now, no intervention planned. Vitals stable. Labs notable for WBC 20.6, creatinine elevated at 1.89. UA pending. CT abdo/pelvis urologically notable for chronic L renal pelvis and ureteral dilation, left renal cortical atrophy. No discrete obstructing stone or mass seen. Gas within urinary bladder. Urology consulted for further management recommendations.    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  No recent instrumentation. Please place alex catheter and obtain UA/UC. L hydro appears chronic (seen on 2021 imaging) but patient needs urgent renal decompression given associated urinary infection, MELONY, leukocytosis. Patient is scheduled for a L ureteral stent placement with Dr. Viveros this evening at 5p. Please continue NPO status and IV antibiotics.     Formal consult to follow shortly. Please reach out with any questions in the meantime.    Thank you for consulting [facility].     Corazon Dawkins PA-C  [facility]

## 2022-08-14 NOTE — ED TRIAGE NOTES
Pt brought in by [facility].  Pt lives in assisted living.  Staff came to check on her and found her in bed with dark red blood saturating the bedding.  EMS unable to get BP, stated she was mottled and new confusion.  Pt arrives alert with bp 128/78, no mottling noted.      Triage Assessment     Row Name 08/14/22 0314       Triage Assessment (Adult)    Airway WDL WDL       Respiratory WDL    Respiratory WDL WDL       Skin Circulation/Temperature WDL    Skin Circulation/Temperature WDL WDL       Cardiac WDL    Cardiac WDL WDL       Peripheral/Neurovascular WDL    Peripheral Neurovascular WDL WDL       Cognitive/Neuro/Behavioral WDL    Cognitive/Neuro/Behavioral WDL WDL

## 2022-08-14 NOTE — PHARMACY-VANCOMYCIN DOSING SERVICE
"Pharmacy Vancomycin Initial Note  Date of Service 2022  Patient's  1934  88 year old, female    Indication: Urinary Tract Infection, possible sepsis    Current estimated CrCl = Estimated Creatinine Clearance: 27.7 mL/min (A) (based on SCr of 1.89 mg/dL (H)).    Creatinine for last 3 days  2022:  3:31 AM Creatinine 2.28 mg/dL;  7:21 AM Creatinine 1.89 mg/dL    Recent Vancomycin Level(s) for last 3 days  No results found for requested labs within last 72 hours.      Vancomycin IV Administrations (past 72 hours)      No vancomycin orders with administrations in past 72 hours.                Nephrotoxins and other renal medications (From now, onward)    Start     Dose/Rate Route Frequency Ordered Stop    22 1900  vancomycin (VANCOCIN) 1,500 mg in sodium chloride 0.9 % 250 mL intermittent infusion         1,500 mg  over 90 Minutes Intravenous ONCE 22 18422 1841  vancomycin place farfan - receiving intermittent dosing         1 each Intravenous SEE ADMIN INSTRUCTIONS 22 18422 1400  piperacillin-tazobactam (ZOSYN) 3.375 g vial to attach to  mL bag        Note to Pharmacy: For [facility], [facility] and [facility]: For Zosyn-naive patients, use the \"Zosyn initial dose + extended infusion\" order panel.    3.375 g  over 240 Minutes Intravenous EVERY 8 HOURS 22 1202            Contrast Orders - past 72 hours (72h ago, onward)    Start     Dose/Rate Route Frequency Stop    22 1740  iohexol (OMNIPAQUE) 300 mg/mL injection  Status:  Discontinued           PRN 22 1801    22 0530  iopamidol (ISOVUE-370) solution 80 mL         80 mL Intravenous ONCE 22 0505                Plan:  1. Start vancomycin  1500 mg IV once.  Patient has acute kidney injury.    2. Recheck serum creatinine levels in am and order future doses as appropriate.     3. Serum creatinine levels will be ordered daily for the first week of therapy and at least twice weekly for " subsequent weeks.      Nya Alvarez, [facility]

## 2022-08-14 NOTE — ED NOTES
Bed: JNED-01  Expected date: 8/14/22  Expected time: 3:07 AM  Means of arrival: Ambulance  Comments:  Radha Loza F--GI Bleed

## 2022-08-14 NOTE — ANESTHESIA PREPROCEDURE EVALUATION
Anesthesia Pre-Procedure Evaluation    Patient: Bessy Sevilla   MRN: [id number] : 1934        Procedure : Procedure(s):  CYSTOSCOPY, WITH RETROGRADE PYELOGRAM AND URETERAL STENT REPLACEMENT          History reviewed. No pertinent past medical history.   Past Surgical History:   Procedure Laterality Date     IR ABSCESS DRAIN INSERTION  2016     LAPAROSCOPIC CHOLECYSTECTOMY  2016     LAPAROSCOPIC CHOLECYSTECTOMY N/A 2016    Procedure: CHOLECYSTECTOMY LAPAROSCOPIC;  Surgeon: Brianna Cast MD;  Location: [facility];  Service:      SPINAL FUSION        Allergies   Allergen Reactions     Sulfa (Sulfonamide Antibiotics) [Sulfa Drugs] Swelling      Social History     Tobacco Use     Smoking status: Never Smoker     Smokeless tobacco: Never Used   Substance Use Topics     Alcohol use: No      Wt Readings from Last 1 Encounters:   22 120.7 kg (266 lb)        Anesthesia Evaluation   Pt has had prior anesthetic. Type: General.    No history of anesthetic complications       ROS/MED HX  ENT/Pulmonary:     (+) asthma COPD,     Neurologic:     (+) CVA, with deficits, - residual left-sided weakness.     Cardiovascular:     (+) Dyslipidemia hypertension-----dysrhythmias (xarelto), a-fib,  (-) murmur   METS/Exercise Tolerance:     Hematologic:       Musculoskeletal:  - neg musculoskeletal ROS     GI/Hepatic:       Renal/Genitourinary:     (+) renal disease (CKD stage 3), type: CRI, Nephrolithiasis ,     Endo:       Psychiatric/Substance Use:  - neg psychiatric ROS     Infectious Disease:       Malignancy:       Other:            Physical Exam    Airway        Mallampati: II   TM distance: > 3 FB   Neck ROM: full   Mouth opening: > 3 cm    Respiratory Devices and Support         Dental  no notable dental history         Cardiovascular          Rhythm and rate: regular and tachycardia (-) no murmur    Pulmonary           breath sounds clear to auscultation           OUTSIDE LABS:  CBC:   Lab  Results   Component Value Date    WBC 20.6 (H) 08/14/2022    WBC 7.7 04/22/2022    HGB 9.5 (L) 08/14/2022    HGB 10.0 (L) 08/14/2022    HCT 33.6 (L) 08/14/2022    HCT 34.2 (L) 04/22/2022     08/14/2022     04/22/2022     BMP:   Lab Results   Component Value Date     08/14/2022     08/14/2022    POTASSIUM 4.9 08/14/2022    POTASSIUM 5.2 (H) 08/14/2022    CHLORIDE 104 08/14/2022    CHLORIDE 101 08/14/2022    CO2 18 (L) 08/14/2022    CO2 14 (L) 08/14/2022    BUN 38 (H) 08/14/2022    BUN 37 (H) 08/14/2022    CR 1.89 (H) 08/14/2022    CR 2.28 (H) 08/14/2022     08/14/2022     (H) 08/14/2022     COAGS:   Lab Results   Component Value Date    PTT 32 08/14/2022    INR 2.04 (H) 08/14/2022     POC: No results found for: BGM, HCG, HCGS  HEPATIC:   Lab Results   Component Value Date    ALBUMIN 3.1 (L) 08/14/2022    PROTTOTAL 7.5 08/14/2022    ALT 51 (H) 08/14/2022     (H) 08/14/2022    ALKPHOS 114 08/14/2022    BILITOTAL 0.3 08/14/2022     OTHER:   Lab Results   Component Value Date    MOHINDER 8.9 08/14/2022    MAG 1.7 (L) 08/14/2022       Anesthesia Plan    ASA Status:  3, emergent    NPO Status:  NPO Appropriate    Anesthesia Type: MAC.     - Reason for MAC: chronic cardiopulmonary disease, immobility needed, straight local not clinically adequate              Consents    Anesthesia Plan(s) and associated risks, benefits, and realistic alternatives discussed. Questions answered and patient/representative(s) expressed understanding.     - Discussed: Risks, Benefits and Alternatives for BOTH SEDATION and the PROCEDURE were discussed     - Discussed with:  Patient, Healthcare Power of  (daughter, Rani, BENNETT)         Postoperative Care    Pain management: IV analgesics, Oral pain medications.   PONV prophylaxis: Ondansetron (or other 5HT-3)     Comments:                Gold Burciaga MD

## 2022-08-15 ENCOUNTER — DOCUMENTATION ONLY (OUTPATIENT)
Dept: OTHER | Facility: CLINIC | Age: 87
End: 2022-08-15

## 2022-08-15 ENCOUNTER — APPOINTMENT (OUTPATIENT)
Dept: CARDIOLOGY | Facility: HOSPITAL | Age: 87
DRG: 988 | End: 2022-08-15
Attending: INTERNAL MEDICINE
Payer: COMMERCIAL

## 2022-08-15 ENCOUNTER — APPOINTMENT (OUTPATIENT)
Dept: CT IMAGING | Facility: HOSPITAL | Age: 87
DRG: 988 | End: 2022-08-15
Attending: INTERNAL MEDICINE
Payer: COMMERCIAL

## 2022-08-15 LAB
ALBUMIN SERPL-MCNC: 2.4 G/DL (ref 3.5–5)
ALP SERPL-CCNC: 57 U/L (ref 45–120)
ALT SERPL W P-5'-P-CCNC: 39 U/L (ref 0–45)
ANION GAP SERPL CALCULATED.3IONS-SCNC: 8 MMOL/L (ref 5–18)
AST SERPL W P-5'-P-CCNC: 52 U/L (ref 0–40)
ATRIAL RATE - MUSE: 85 BPM
BACTERIA UR CULT: ABNORMAL
BILIRUB DIRECT SERPL-MCNC: <0.1 MG/DL
BILIRUB SERPL-MCNC: 0.2 MG/DL (ref 0–1)
BLD PROD TYP BPU: NORMAL
BLD PROD TYP BPU: NORMAL
BLOOD COMPONENT TYPE: NORMAL
BLOOD COMPONENT TYPE: NORMAL
BUN SERPL-MCNC: 46 MG/DL (ref 8–28)
CALCIUM SERPL-MCNC: 8.1 MG/DL (ref 8.5–10.5)
CHLORIDE BLD-SCNC: 111 MMOL/L (ref 98–107)
CK SERPL-CCNC: 268 U/L (ref 30–190)
CO2 SERPL-SCNC: 20 MMOL/L (ref 22–31)
CODING SYSTEM: NORMAL
CODING SYSTEM: NORMAL
CREAT SERPL-MCNC: 1.86 MG/DL (ref 0.6–1.1)
CROSSMATCH: NORMAL
CROSSMATCH: NORMAL
DIASTOLIC BLOOD PRESSURE - MUSE: NORMAL MMHG
ERYTHROCYTE [DISTWIDTH] IN BLOOD BY AUTOMATED COUNT: 15.8 % (ref 10–15)
GFR SERPL CREATININE-BSD FRML MDRD: 26 ML/MIN/1.73M2
GLUCOSE BLD-MCNC: 100 MG/DL (ref 70–125)
HCT VFR BLD AUTO: 20.3 % (ref 35–47)
HGB BLD-MCNC: 6.6 G/DL (ref 11.7–15.7)
HGB BLD-MCNC: 7.2 G/DL (ref 11.7–15.7)
HGB BLD-MCNC: 7.9 G/DL (ref 11.7–15.7)
HGB BLD-MCNC: 8 G/DL (ref 11.7–15.7)
INR PPP: 1.35 (ref 0.85–1.15)
INTERPRETATION ECG - MUSE: NORMAL
ISSUE DATE AND TIME: NORMAL
ISSUE DATE AND TIME: NORMAL
LACTATE SERPL-SCNC: 1.4 MMOL/L (ref 0.7–2)
LVEF ECHO: NORMAL
MAGNESIUM SERPL-MCNC: 2.1 MG/DL (ref 1.8–2.6)
MCH RBC QN AUTO: 28.4 PG (ref 26.5–33)
MCHC RBC AUTO-ENTMCNC: 32.5 G/DL (ref 31.5–36.5)
MCV RBC AUTO: 88 FL (ref 78–100)
P AXIS - MUSE: -10 DEGREES
PHOSPHATE SERPL-MCNC: 4.6 MG/DL (ref 2.5–4.5)
PLATELET # BLD AUTO: 202 10E3/UL (ref 150–450)
POTASSIUM BLD-SCNC: 4.3 MMOL/L (ref 3.5–5)
PR INTERVAL - MUSE: 164 MS
PROT SERPL-MCNC: 4.9 G/DL (ref 6–8)
QRS DURATION - MUSE: 90 MS
QT - MUSE: 378 MS
QTC - MUSE: 449 MS
R AXIS - MUSE: -31 DEGREES
RBC # BLD AUTO: 2.32 10E6/UL (ref 3.8–5.2)
SODIUM SERPL-SCNC: 139 MMOL/L (ref 136–145)
SYSTOLIC BLOOD PRESSURE - MUSE: NORMAL MMHG
T AXIS - MUSE: 87 DEGREES
TROPONIN I SERPL-MCNC: 0.09 NG/ML (ref 0–0.29)
UNIT ABO/RH: NORMAL
UNIT ABO/RH: NORMAL
UNIT NUMBER: NORMAL
UNIT NUMBER: NORMAL
UNIT STATUS: NORMAL
UNIT STATUS: NORMAL
UNIT TYPE ISBT: 7300
UNIT TYPE ISBT: 7300
VANCOMYCIN SERPL-MCNC: 13.2 MG/L
VENTRICULAR RATE- MUSE: 85 BPM
WBC # BLD AUTO: 17.8 10E3/UL (ref 4–11)

## 2022-08-15 PROCEDURE — 93306 TTE W/DOPPLER COMPLETE: CPT | Mod: 26 | Performed by: INTERNAL MEDICINE

## 2022-08-15 PROCEDURE — 84484 ASSAY OF TROPONIN QUANT: CPT | Performed by: INTERNAL MEDICINE

## 2022-08-15 PROCEDURE — 82248 BILIRUBIN DIRECT: CPT | Performed by: INTERNAL MEDICINE

## 2022-08-15 PROCEDURE — 93005 ELECTROCARDIOGRAM TRACING: CPT

## 2022-08-15 PROCEDURE — 250N000011 HC RX IP 250 OP 636: Performed by: INTERNAL MEDICINE

## 2022-08-15 PROCEDURE — 85018 HEMOGLOBIN: CPT | Performed by: INTERNAL MEDICINE

## 2022-08-15 PROCEDURE — 80202 ASSAY OF VANCOMYCIN: CPT | Performed by: INTERNAL MEDICINE

## 2022-08-15 PROCEDURE — 85610 PROTHROMBIN TIME: CPT | Performed by: INTERNAL MEDICINE

## 2022-08-15 PROCEDURE — C9113 INJ PANTOPRAZOLE SODIUM, VIA: HCPCS | Performed by: INTERNAL MEDICINE

## 2022-08-15 PROCEDURE — 74174 CTA ABD&PLVS W/CONTRAST: CPT

## 2022-08-15 PROCEDURE — P9016 RBC LEUKOCYTES REDUCED: HCPCS | Performed by: INTERNAL MEDICINE

## 2022-08-15 PROCEDURE — 36415 COLL VENOUS BLD VENIPUNCTURE: CPT | Performed by: INTERNAL MEDICINE

## 2022-08-15 PROCEDURE — 83735 ASSAY OF MAGNESIUM: CPT | Performed by: INTERNAL MEDICINE

## 2022-08-15 PROCEDURE — 250N000013 HC RX MED GY IP 250 OP 250 PS 637: Performed by: INTERNAL MEDICINE

## 2022-08-15 PROCEDURE — 82550 ASSAY OF CK (CPK): CPT | Performed by: INTERNAL MEDICINE

## 2022-08-15 PROCEDURE — 82310 ASSAY OF CALCIUM: CPT | Performed by: INTERNAL MEDICINE

## 2022-08-15 PROCEDURE — 999N000208 ECHOCARDIOGRAM COMPLETE

## 2022-08-15 PROCEDURE — 999N000127 HC STATISTIC PERIPHERAL IV START W US GUIDANCE

## 2022-08-15 PROCEDURE — 258N000003 HC RX IP 258 OP 636: Performed by: INTERNAL MEDICINE

## 2022-08-15 PROCEDURE — 99221 1ST HOSP IP/OBS SF/LOW 40: CPT | Performed by: INTERNAL MEDICINE

## 2022-08-15 PROCEDURE — 93010 ELECTROCARDIOGRAM REPORT: CPT | Performed by: INTERNAL MEDICINE

## 2022-08-15 PROCEDURE — 258N000001 HC RX 258: Performed by: INTERNAL MEDICINE

## 2022-08-15 PROCEDURE — 210N000001 HC R&B IMCU HEART CARE

## 2022-08-15 PROCEDURE — 255N000002 HC RX 255 OP 636: Performed by: INTERNAL MEDICINE

## 2022-08-15 PROCEDURE — 83605 ASSAY OF LACTIC ACID: CPT | Performed by: INTERNAL MEDICINE

## 2022-08-15 PROCEDURE — 84100 ASSAY OF PHOSPHORUS: CPT | Performed by: INTERNAL MEDICINE

## 2022-08-15 RX ORDER — IOPAMIDOL 755 MG/ML
75 INJECTION, SOLUTION INTRAVASCULAR ONCE
Status: COMPLETED | OUTPATIENT
Start: 2022-08-15 | End: 2022-08-15

## 2022-08-15 RX ADMIN — PIPERACILLIN AND TAZOBACTAM 3.38 G: 3; .375 INJECTION, POWDER, LYOPHILIZED, FOR SOLUTION INTRAVENOUS at 23:15

## 2022-08-15 RX ADMIN — UMECLIDINIUM 1 PUFF: 62.5 AEROSOL, POWDER ORAL at 10:50

## 2022-08-15 RX ADMIN — PANTOPRAZOLE SODIUM 40 MG: 40 INJECTION, POWDER, FOR SOLUTION INTRAVENOUS at 10:52

## 2022-08-15 RX ADMIN — DEXTROSE AND SODIUM CHLORIDE: 5; 450 INJECTION, SOLUTION INTRAVENOUS at 20:01

## 2022-08-15 RX ADMIN — MICONAZOLE NITRATE: 20 POWDER TOPICAL at 10:51

## 2022-08-15 RX ADMIN — PANTOPRAZOLE SODIUM 40 MG: 40 INJECTION, POWDER, FOR SOLUTION INTRAVENOUS at 20:01

## 2022-08-15 RX ADMIN — PIPERACILLIN AND TAZOBACTAM 3.38 G: 3; .375 INJECTION, POWDER, LYOPHILIZED, FOR SOLUTION INTRAVENOUS at 06:04

## 2022-08-15 RX ADMIN — PERFLUTREN 3 ML: 6.52 INJECTION, SUSPENSION INTRAVENOUS at 13:54

## 2022-08-15 RX ADMIN — SODIUM CHLORIDE: 9 INJECTION, SOLUTION INTRAVENOUS at 06:04

## 2022-08-15 RX ADMIN — VANCOMYCIN HYDROCHLORIDE 750 MG: 1 INJECTION, POWDER, LYOPHILIZED, FOR SOLUTION INTRAVENOUS at 20:01

## 2022-08-15 RX ADMIN — IOPAMIDOL 75 ML: 755 INJECTION, SOLUTION INTRAVENOUS at 17:16

## 2022-08-15 RX ADMIN — DEXTROSE AND SODIUM CHLORIDE: 5; 450 INJECTION, SOLUTION INTRAVENOUS at 10:44

## 2022-08-15 RX ADMIN — PIPERACILLIN AND TAZOBACTAM 3.38 G: 3; .375 INJECTION, POWDER, LYOPHILIZED, FOR SOLUTION INTRAVENOUS at 15:11

## 2022-08-15 RX ADMIN — FLUTICASONE FUROATE 1 PUFF: 100 POWDER RESPIRATORY (INHALATION) at 10:51

## 2022-08-15 ASSESSMENT — ACTIVITIES OF DAILY LIVING (ADL)
ADLS_ACUITY_SCORE: 43
ADLS_ACUITY_SCORE: 37
ADLS_ACUITY_SCORE: 43

## 2022-08-15 NOTE — CONSULTS
Care Management Initial Consult    General Information  Assessment completed with: Rani Michelle  Type of CM/SW Visit: Initial Assessment    Primary Care Provider verified and updated as needed:     Readmission within the last 30 days:        Reason for Consult: discharge planning  Advance Care Planning:            Communication Assessment  Patient's communication style: spoken language (English or Bilingual)             Cognitive  Cognitive/Neuro/Behavioral: WDL  Level of Consciousness: alert     Orientation: disoriented to, time  Mood/Behavior: calm, cooperative  Best Language: 0 - No aphasia  Speech: clear, spontaneous    Living Environment:   People in home:       Current living Arrangements: assisted living, other (see comments) (memory care)  Name of Facility: [facility]   Able to return to prior arrangements:         Family/Social Support:  Care provided by: other (see comments) (Facility staff)  Provides care for: no one, unable/limited ability to care for self                Description of Support System:           Current Resources:   Patient receiving home care services:       Community Resources:    Equipment currently used at home:    Supplies currently used at home:      Employment/Financial:  Employment Status:          Financial Concerns:             Lifestyle & Psychosocial Needs:  Social Determinants of Health     Tobacco Use: Low Risk      Smoking Tobacco Use: Never Smoker     Smokeless Tobacco Use: Never Used   Alcohol Use: Not on file   Financial Resource Strain: Not on file   Food Insecurity: Not on file   Transportation Needs: Not on file   Physical Activity: Not on file   Stress: Not on file   Social Connections: Not on file   Intimate Partner Violence: Not on file   Depression: Not on file   Housing Stability: Not on file       Functional Status:  Prior to admission patient needed assistance:              Mental Health Status:          Chemical Dependency Status:                 Values/Beliefs:  Spiritual, Cultural Beliefs, [employer] Practices, Values that affect care:                 Additional Information:  Called patients daughter Rani, completed initial assessment. Requested update on patient also. RNCM stated would call back after discussion with patients care team this afternoon.     Hanna Osorio RN

## 2022-08-15 NOTE — PLAN OF CARE
Goal Outcome Evaluation:      Patient up from surgery, alert and orient has some forgetfulness, has alex intact urine pale yellow, with a large amount of sediment, abdomen soft bowel sounds active

## 2022-08-15 NOTE — PROGRESS NOTES
"  [facility] - POSTOP PROGRESS NOTE    PLACE OF SERVICE:  [facility]     SURGERY: POD #1 after cystoscopy, left retrograde pyelogram, left ureteral stent placement, cystogram by Dr Hilario Viveros for left hydronephrosis      SUBJECTIVE:   Events: no acute events overnight    Patient reports she is feeling ok today. Denies abdominal and bilateral flank pain, fever, chills. No problems with alex catheter overnight.     OBJECTIVE:  PHYSICAL EXAM  Vital signs:  Temp: 98.4  F (36.9  C) Temp src: Oral BP: (!) 157/63 Pulse: 101   Resp: 20 SpO2: 95 % O2 Device: Nasal cannula Oxygen Delivery: 1.5 LPM Height: 170.2 cm (5' 7\") Weight: 91.6 kg (202 lb)  Estimated body mass index is 31.64 kg/m  as calculated from the following:    Height as of this encounter: 1.702 m (5' 7\").    Weight as of this encounter: 91.6 kg (202 lb).    General: NAD, alert, cooperative  Abdomen: Soft, non-tender, no bilateral CVA tenderness, no SP fullness or tenderness.  : 16 fr catheter in place draining clear mitesh urine mixed with large quantities of purulent sediment in catheter tubing.     LABS:  INR   Date Value Ref Range Status   08/15/2022 1.35 (H) 0.85 - 1.15 Final      Lab Results   Component Value Date    WBC 17.8 08/15/2022     Lab Results   Component Value Date    HGB 6.6 08/15/2022     Lab Results   Component Value Date     08/15/2022     Creatinine   Date Value Ref Range Status   08/15/2022 1.86 (H) 0.60 - 1.10 mg/dL Final     Sodium   Date Value Ref Range Status   08/15/2022 139 136 - 145 mmol/L Final     Potassium   Date Value Ref Range Status   08/15/2022 4.3 3.5 - 5.0 mmol/L Final     Magnesium   Date Value Ref Range Status   08/15/2022 2.1 1.8 - 2.6 mg/dL Final     CULTURES:  Blood x 2 8/14/22: NGTD  Urine 8/14/22, prelim: >100k gram neg bacilli     Lab Results: personally reviewed.     ASSESSMENT/PLAN:  POD #1 after cystoscopy, left retrograde pyelogram, left ureteral stent placement, cystogram by Dr" Hilario Viveros for left hydronephrosis in setting of MELONY and emphysematous cystitis     - Tolerating stent, no pain.   - Creatinine 1.86 today, 1.89 pre-op 8/14, baseline 1.17 on 4/22/22. Avoid nephrotoxins. Hydration per primary team. Monitor.   - Urine culture prelim: >100k gram neg bacilli. Purulent drainage persists in urine. BC's: NGTD. Last fever 100.5 on 8/14/22 afternoon. Continue broad spectrum antibiotic, adjust as needed pending final cultures/sensitivities. Recommend 10 day course.   - Hx of chronic urinary incontinence, recurrent UTIs, OAB. Consider maintaining alex chronically with monthly exchanges (will need to discuss further with daughter and patient). Has appt with GABRIELE Grady PA-C 8/16/2022 at 1400, will send email to reschedule within 2-3 weeks and notified GABRIELE Grady. [facility].  [facility] contact info placed in discharge orders.   - Will also arrange for cysto/left stent exchange in approximately 4 months per Dr. Viveros's request.   - Will continue to follow.       Discussed patient with Dr Hilario Rudd, APRN, CNP  [facility]   [phone]

## 2022-08-15 NOTE — PROGRESS NOTES
[facility] Digestive Health Progress Note      Impression:    Patient admitted yesterday with lower GI bleeding and drop in hemoglobin.  Hgb 6.6 today. She has been on Xarelto for history of CVA and A. fib which was held yesterday.  Patient today does not recall having rectal bleeding and does not know the present year.  She does know she is in [facility].  At this point, I suspect the Xarelto is contributing to her GI blood loss.  I do not have any records of past colonoscopy in our office. It would be helpful to determine if she has had endoscopic procedures in last 3 to 5 years with her family.  I told the patient and nurse that we should continue to hold Xarelto and determine how aggressive we are going to be with potential endoscopic therapy with the family today.  She would need to have her consent signed by the POA.  If they want to proceed with colonoscopy and upper GI endoscopy, she would need to have a bowel prep started tomorrow and this would likely have to be arranged in the OR on Wednesday or Thursday this week.  Patient appears comfortable at this time and has no abdominal pain.    Recommendations:    1.  Transfuse as needed per hospitalist.  2.  Continue to hold Xarelto.  3.  Clarify with family any recent endoscopic procedures over the last 3 to 5 years and determine how aggressive we with them we would not be with performing possible colonoscopy/endoscopy this admission.  She would need to have consent signed by her power of  and procedures would likely be attempted again midweek if she tolerates a bowel prep.  We could start bowel prep tomorrow if that is the direction we are heading.  4.  Clear liquids today.  5.  We will clinically reassess tomorrow.    Telephone 16:15-Spoke with daughter, Rani Osborne and updated on issues.  She is unaware of any endoscopic history-mother kept her health care private,  She would be the one who would sign consent for her, if needed.  Told her about Hgb today,  transfusion and bleeding this afternoon with repeat CTA and ? Angio if positive.  We'll reassess tomorrow regarding EGD and colonoscopy plans..      Subjective:     Patient states she feels well and has no abdominal pain.  She states she is in the hospital for issues with her urine.  She does not recall having rectal bleeding.  She knows she is at [facility] but does not know the year.  She appears comfortable. Left ureteral stent placed yesterday for hydroureteronephrosis.    Objective:  Vital signs in last 24 hours    Temp:  [97.8  F (36.6  C)-100.5  F (38.1  C)] 98.4  F (36.9  C)  Pulse:  [] 101  Resp:  [18-24] 20  BP: (108-172)/(56-94) 157/63  SpO2:  [86 %-100 %] 95 % [unfilled] O2 Device: Nasal cannula      Gen: Awake, no acute distress  Pulmonary: Lungs clear to ausculation bilaterally  Cardiovascular: Regular  Gastrointestinal: Positive bowel sounds, soft, non-tender, non-distended, no rebound or guarding  Neuro:  Mental status some confusion to clinical issues and year; moves all extremities    Patient Active Problem List   Diagnosis     Acute cholecystitis     Essential hypertension     Dyslipidemia     Obstructive lung disease (H)     Overactive bladder     S/P laparoscopic cholecystectomy     Volume overload     Acute postoperative respiratory failure (H)     Elevated LFTs     Infection due to Enterobacter cloacae     UTI (lower urinary tract infection)     Leukocytosis     Atrial fibrillation (H)     MELONY (acute kidney injury) (H)     Abdominal abscess     Allergic rhinitis     Hemorrhoid     Asthma     Gastrointestinal hemorrhage, unspecified gastrointestinal hemorrhage type     Left hydrocele     Hydronephrosis of left kidney       Labs:  CMP Results:   Recent Labs   Lab Test 08/15/22  0555      POTASSIUM 4.3   CHLORIDE 111*   CO2 20*   ANIONGAP 8      BUN 46*   CR 1.86*   BILITOTAL 0.2   ALKPHOS 57   ALT 39   AST 52*      CBC  Recent Labs   Lab 08/15/22  0555 08/15/22  0020  08/14/22  1528 08/14/22  1214 08/14/22  0631 08/14/22  0331   WBC 17.8*  --   --   --   --  20.6*   RBC 2.32*  --   --   --   --  3.64*   HGB 6.6* 7.2* 9.5* 10.0*   < > 10.6*   HCT 20.3*  --   --   --   --  33.6*   MCV 88  --   --   --   --  92   MCH 28.4  --   --   --   --  29.1   MCHC 32.5  --   --   --   --  31.5   RDW 15.8*  --   --   --   --  15.4*     --   --   --   --  396    < > = values in this interval not displayed.     INR  Recent Labs   Lab 08/15/22  0932 08/14/22  0331   INR 1.35* 2.04*      No results found for: LIPASE    Radiology:    CTA Abdomen Pelvis with Contrast    Result Date: 8/14/2022  EXAM: CTA ABDOMEN PELVIS WITH CONTRAST LOCATION: [facility] DATE/TIME: 8/14/2022 5:02 AM INDICATION: Gastrointestinal bleeding COMPARISON: None. TECHNIQUE: CT angiogram abdomen pelvis during arterial phase of injection of IV contrast. 2D and 3D MIP reconstructions were performed by the CT technologist. Dose reduction techniques were used. CONTRAST: 5 mL Isovue 370 FINDINGS:  AORTO-ILIAC: No intramural hematoma. No dissection or aneurysm. No significant focal stenosis or branch vessel occlusion. There is classic hepatic arterial anatomy. There are single bilateral renal arteries. LOWER CHEST: Several small noncalcified pulmonary nodules, largest measuring 5 mm in the right lower lobe, axial image 107. Mild dependent atelectasis. Moderate cardiomegaly. Atheromatous calcified location of coronary arteries and the visualized descending thoracic aorta. HEPATOBILIARY: Liver enhances normally and is normal in size. Cholecystectomy. No intrahepatic or intrahepatic biliary ductal dilatation. PANCREAS: Enhances normally. No peripancreatic inflammatory fat stranding. SPLEEN: Enhances normally. Normal size. ADRENAL GLANDS: Normal. KIDNEYS: Progressive left renal cortical atrophy and chronic left renal pelvis and left ureteral dilatation with diffuse urothelial thickening and asymmetric left  renal enhancement. No discrete obstructing stone or mass lesion is not identified, though assessment within the pelvis is limited due to extensive beam hardening and streak artifact from bilateral hip arthroplasties. This appearance is similar to 2016 and may reflect sequela of previous obstruction. Multifocal bilateral renal cortical scarring. There is a small nonobstructing right renal stone. There is gas within the urinary bladder; the absence of any recent instrumentation, infection cannot be excluded. Correlation with urinalysis recommended. PELVIC ORGANS: Uterus is surgically absent. BOWEL: No evidence of acute gastrointestinal inflammation or obstruction. Colonic diverticulosis. No evidence of active gastrointestinal bleeding. Normal appendix. No intraperitoneal free fluid or free air. LYMPH NODES: No suspicious abdominal or pelvic lymphadenopathy. VASCULATURE: See arterial findings, as detailed above. Mild to moderate atheromatous disease. MUSCULOSKELETAL: Bilateral sacroiliac joint erosions, which are age indeterminate. Instrumented posterior spinal fusion from L3 to L5. OTHER: No additionally significant abnormalities.     IMPRESSION: 1.  No CT evidence of acute gastrointestinal bleeding. 2.  Gas within the urinary bladder. In the absence of any recent instrumentation, this may reflect underlying cystitis. Correlation urinalysis recommended. 3.  Capacious appearance of the left renal pelvis and left ureter, similar to prior and likely a chronic finding. Discrete obstructing stone or mass lesion is not identified. Of note, the distal left ureter is difficult to assess, due to extensive beam hardening and streak artifact from bilateral hip arthroplasties. 4.  Age-indeterminate, bilateral sacroiliitis. 5.  Diverticulosis. 6.  Small, nonobstructing right renal stone. 7.  Small noncalcified pulmonary nodules, measuring no greater than 5 mm. Follow-up is recommended according to Fleischner criteria, as detailed  below. Fleischner Society Recommendations for Pulmonary Nodules Nodule size less than 6 mm: Single or multiple nodules: Nodules < 6 mm do not require routine follow-up, but certain patients at high risk with suspicious nodule morphology, upper lobe location, or both may warrant 12-month follow-up.    XR Surgery LAURA L/T 5 Min Fluoro    Result Date: 8/14/2022  This exam was marked as non-reportable because it will not be read by a radiologist or a [facility] non-radiologist provider.         I spent 25 minutes reviewing the Epic chart, talking with and examining the patient, synthesizing the information and formulating an impression/recommendation.                                                  Dallas Rahman MD  Thank you for the opportunity to participate in the care of this patient.   Please feel free to call me with any questions or concerns.  Phone number [phone].

## 2022-08-15 NOTE — PROGRESS NOTES
A/P    88 year old female with past medical history of A. fib on Xarelto, CVA, LANRE, HTN who resides in assisted living facility, when staff came to check on her, found her in the bed with dark red blood saturating bedding and brought to ED for evaluation, found to have drop in hemoglobin, significant increase in creatinine compared to before and admitted for further management.     Acute GI bleeding in the setting of anticoagulation; suspect diverticular bleeding  Acute blood loss anemia due to GI bleed;  --On admission, CTA abdomen pelvis with no evidence of active GI bleeding  --Repeat CT angiogram abdomen/pelvis due to GI bleeding this afternoon.  GI involved.  If CT positive will need to proceed with angiography.  --Hold home Xarelto  --N.p.o., IV fluid, PPI.    -- GI following    Acute left hydroureteronephrosis: Status post left ureteral stent on 8/14,022  Acute kidney injury on CKD stage III; improved.  Creatinine 2.28 on admission down to 1.86 currently.  Metabolic acidosis; improving  Mild hyperkalemia; resolved  -Zosyn/Vanco  - Strict intake and output and daily weights  - Avoid nephrotoxic medications  - Rajput  -Urology following    Acute blood loss anemia: Secondary to problem #1  - Total of 4 units of packed red blood cells transfused  - Xarelto on hold  - Work-up as above  - Serial hemoglobins     Essential hypertension;  -- hold BP meds     History of PAF;  -- Heart rate controlled.    --Home Xarelto on hold due to GI bleed     History of CVA with left-sided weakness, wheelchair-bound per previous medical record;  --Xarelto on hold due to GI bleeding  -- PT OT consult when medically stable     Hx of COPD; not on exacerbation; home inhalers. IS     GI prophylaxis; IV PPI  DVT prophylaxis; SCDs, no pharmacological agent given GI bleeding    Full code    Greater than 2-day stay        Subjective: Afebrile.  Events overnight noted.    Objective:  /62   Pulse 86   Temp 98.3  F (36.8  C) (Oral)    "Resp 18   Ht 1.702 m (5' 7\")   Wt 91.6 kg (202 lb)   SpO2 96%   BMI 31.64 kg/m    Physical Examination:   General appearance - alert, and in no distress  Eyes -  sclera anicteric  Mouth - mucous membranes pasty, pharynx normal without lesions  Lungs -decreased at the bases, no wheezes, rales or rhonchi, symmetric air entry  Heart - normal rate, regular rhythm, normal S1, S2, no murmurs, rubs, clicks or gallops. No peripheral edema.  Rajput with yellow urine  Abdomen - soft, nontender, nondistended, no masses or organomegaly, BS+  Neurological - alert, oriented, some disorientation at times.  Skin -C/C/P    Lab/imaging studies reviewed       "

## 2022-08-15 NOTE — PLAN OF CARE
Goal Outcome Evaluation:    Pt alert, disoriented to time. Pt hgb 6.6 this AM, given 2 units of blood today. Hgb recheck at 1600. Pt had 2 large, incontinent loose bloody stools today. VSS. Dr. Rahman from GI notified, stat CTA ordered. Tele NSR this morning, flipped into bigeminy a few times today. Pt continues on 1.5L NC, LS diminished with fine crackles in bases. Plan is to do EGD and colonoscopy on Wed, prep tomorrow.

## 2022-08-15 NOTE — PLAN OF CARE
Assumed care 1900 to 0730. A&O x 4, forgetful. Assist x 2, team lift. Tele is NSR. Denies pain. Rajput in place, bag below bladder. Call light within reach, able to make needs known. Bed alarm on for safety.    Problem: Risk for Delirium  Goal: Improved Sleep  Outcome: Ongoing, Progressing     Problem: Plan of Care - These are the overarching goals to be used throughout the patient stay.    Goal: Optimal Comfort and Wellbeing  Intervention: Monitor Pain and Promote Comfort  Recent Flowsheet Documentation  Taken 8/15/2022 0400 by KRISTEN SMITH  Pain Management Interventions: declines

## 2022-08-15 NOTE — ANESTHESIA POSTPROCEDURE EVALUATION
Patient: Bessy Sevilla    Procedure: Procedure(s):  CYSTOSCOPY, WITH RETROGRADE PYELOGRAM AND URETERAL STENT REPLACEMENT       Anesthesia Type:  MAC    Note:  Disposition: Outpatient   Postop Pain Control: Uneventful            Sign Out: Well controlled pain   PONV: No   Neuro/Psych: Uneventful            Sign Out: Acceptable/Baseline neuro status   Airway/Respiratory: Uneventful            Sign Out: Acceptable/Baseline resp. status   CV/Hemodynamics: Uneventful            Sign Out: Acceptable CV status; No obvious hypovolemia; No obvious fluid overload   Other NRE: NONE   DID A NON-ROUTINE EVENT OCCUR? No           Last vitals:  Vitals:    08/14/22 1912 08/14/22 1929 08/14/22 2001   BP: 135/74 (!) 146/71 (!) 142/62   Pulse: 85 89 90   Resp: 20  18   Temp: 36.7  C (98  F)  36.6  C (97.8  F)   SpO2: 90% (!) 86% 94%       Electronically Signed By: Michelle Evans MD  August 14, 2022  8:05 PM

## 2022-08-15 NOTE — PHARMACY-VANCOMYCIN DOSING SERVICE
"Pharmacy Vancomycin Initial Note  Date of Service August 15, 2022  Patient's  1934  88 year old, female    Indication: Urinary Tract Infection, possible sepsis    Current estimated CrCl = Estimated Creatinine Clearance: 24.3 mL/min (A) (based on SCr of 1.86 mg/dL (H)).    Creatinine for last 3 days  2022:  3:31 AM Creatinine 2.28 mg/dL;  7:21 AM Creatinine 1.89 mg/dL  8/15/2022:  5:55 AM Creatinine 1.86 mg/dL    Recent Vancomycin Level(s) for last 3 days  8/15/2022:  5:55 AM Vancomycin 13.2 mg/L      Vancomycin IV Administrations (past 72 hours)                   vancomycin (VANCOCIN) 1,500 mg in sodium chloride 0.9 % 250 mL intermittent infusion (mg) 1,500 mg New Bag 22                Nephrotoxins and other renal medications (From now, onward)    Start     Dose/Rate Route Frequency Ordered Stop    22 184  vancomycin place farfan - receiving intermittent dosing         1 each Intravenous SEE ADMIN INSTRUCTIONS 22 18422 1400  piperacillin-tazobactam (ZOSYN) 3.375 g vial to attach to  mL bag        Note to Pharmacy: For [facility], [facility] and [facility]: For Zosyn-naive patients, use the \"Zosyn initial dose + extended infusion\" order panel.    3.375 g  over 240 Minutes Intravenous EVERY 8 HOURS 22 1202            Contrast Orders - past 72 hours (72h ago, onward)    Start     Dose/Rate Route Frequency Stop    22 1740  iohexol (OMNIPAQUE) 300 mg/mL injection  Status:  Discontinued           PRN 22 1801    22 0530  iopamidol (ISOVUE-370) solution 80 mL         80 mL Intravenous ONCE 22 0505          [employer] Prediction of Planned Initial Vancomycin Regimen    Regimen: 750 mg IV every 24 hours.  Start time: 10:20 on 08/15/2022  Exposure target: AUC24 (range)400-600 mg/L.hr   AUC24,ss: 488 mg/L.hr  Probability of AUC24 > 400: 81 %  Ctrough,ss: 17.0 mg/L  Probability of Ctrough,ss > 20: 27 %  Probability of nephrotoxicity (Lodise KENROY ): 13 %     "      Plan:  1. Start vancomycin  750 mg IV q24h.   2. Vancomycin monitoring method: AUC  3. Vancomycin therapeutic monitoring goal: 400-600 mg*h/L  4. Pharmacy will check vancomycin levels as appropriate in 1-3 Days.    5. Serum creatinine levels will be ordered daily for the first week of therapy and at least twice weekly for subsequent weeks.      DARRELL CANTU RPH

## 2022-08-15 NOTE — UTILIZATION REVIEW
Admission Status; Secondary Review Determination   Under the authority of the Utilization Management Committee, the utilization review process indicated a secondary review on Bessy Sevilla. The review outcome is based on review of the medical records, discussions with staff, and applying clinical experience noted on the date of the review.   (x) Inpatient Status Appropriate - This patient's medical care is consistent with medical management for inpatient care and reasonable inpatient medical practice.     RATIONALE FOR DETERMINATION   88 yr old female with HTN, HLD, PAF on Xarelto, GERD, Obstructive lung disease and prior GI bleeding as well as HTN CKD, CVA who presented with GI bleeding.  EMS unable to get IV access or BP with pulses weak and HR 60s.  Initial HGB 10.6 but dropped to 9.2 with further bloody stools.  Held Xarelto.  Cr elevated at 1.89 with WBC 20.6.  UTI with left hydronephrosis and urgent stent placed.  Cr remains at 1.86.   Baseline 1.17.  Gram negative bacilli on UCx and IV abx continue.  HGB now 6.6 and transfusion planned.  Holding Xarelto.  GI following and discussing endoscopy with family.  WBC still elevated at 17.8.  Not medically stable for discharge with IV abx, IVF, serial hgb, blood transfusion and endoscopy planned.     At the time of admission with the information available to the attending physician more than 2 nights Hospital complex care was anticipated, based on patient risk of adverse outcome if treated as outpatient and complex care required. Inpatient admission is appropriate based on the Medicare guidelines.   The information on this document is developed by the utilization review team in order for the business office to ensure compliance. This only denotes the appropriateness of proper admission status and does not reflect the quality of care rendered.   The definitions of Inpatient Status and Observation Status used in making the determination above are those provided in  the CMS Coverage Manual, Chapter 1 and Chapter 6, section 70.4.   Sincerely,   Pita Singletary MD  Utilization Review  Physician Advisor  [facility]

## 2022-08-16 ENCOUNTER — APPOINTMENT (OUTPATIENT)
Dept: PHYSICAL THERAPY | Facility: HOSPITAL | Age: 87
DRG: 988 | End: 2022-08-16
Attending: INTERNAL MEDICINE
Payer: COMMERCIAL

## 2022-08-16 ENCOUNTER — APPOINTMENT (OUTPATIENT)
Dept: OCCUPATIONAL THERAPY | Facility: HOSPITAL | Age: 87
DRG: 988 | End: 2022-08-16
Attending: INTERNAL MEDICINE
Payer: COMMERCIAL

## 2022-08-16 ENCOUNTER — ANESTHESIA EVENT (OUTPATIENT)
Dept: SURGERY | Facility: HOSPITAL | Age: 87
DRG: 988 | End: 2022-08-16
Payer: COMMERCIAL

## 2022-08-16 LAB
ALBUMIN SERPL-MCNC: 2 G/DL (ref 3.5–5)
ALP SERPL-CCNC: 52 U/L (ref 45–120)
ALT SERPL W P-5'-P-CCNC: 30 U/L (ref 0–45)
ANION GAP SERPL CALCULATED.3IONS-SCNC: 6 MMOL/L (ref 5–18)
AST SERPL W P-5'-P-CCNC: 37 U/L (ref 0–40)
BILIRUB DIRECT SERPL-MCNC: 0.1 MG/DL
BILIRUB SERPL-MCNC: 0.3 MG/DL (ref 0–1)
BLD PROD TYP BPU: NORMAL
BLOOD COMPONENT TYPE: NORMAL
BUN SERPL-MCNC: 40 MG/DL (ref 8–28)
CALCIUM SERPL-MCNC: 7.7 MG/DL (ref 8.5–10.5)
CHLORIDE BLD-SCNC: 115 MMOL/L (ref 98–107)
CO2 SERPL-SCNC: 19 MMOL/L (ref 22–31)
CODING SYSTEM: NORMAL
CREAT SERPL-MCNC: 1.6 MG/DL (ref 0.6–1.1)
CROSSMATCH: NORMAL
ERYTHROCYTE [DISTWIDTH] IN BLOOD BY AUTOMATED COUNT: 16.2 % (ref 10–15)
GFR SERPL CREATININE-BSD FRML MDRD: 31 ML/MIN/1.73M2
GLUCOSE BLD-MCNC: 102 MG/DL (ref 70–125)
HCT VFR BLD AUTO: 19.5 % (ref 35–47)
HGB BLD-MCNC: 6.3 G/DL (ref 11.7–15.7)
HGB BLD-MCNC: 7.4 G/DL (ref 11.7–15.7)
HGB BLD-MCNC: 7.4 G/DL (ref 11.7–15.7)
ISSUE DATE AND TIME: NORMAL
MCH RBC QN AUTO: 31.2 PG (ref 26.5–33)
MCHC RBC AUTO-ENTMCNC: 32.3 G/DL (ref 31.5–36.5)
MCV RBC AUTO: 97 FL (ref 78–100)
PLATELET # BLD AUTO: 96 10E3/UL (ref 150–450)
POTASSIUM BLD-SCNC: 4 MMOL/L (ref 3.5–5)
PROT SERPL-MCNC: 4.2 G/DL (ref 6–8)
RBC # BLD AUTO: 2.02 10E6/UL (ref 3.8–5.2)
SODIUM SERPL-SCNC: 140 MMOL/L (ref 136–145)
TROPONIN I SERPL-MCNC: 0.07 NG/ML (ref 0–0.29)
UNIT ABO/RH: NORMAL
UNIT NUMBER: NORMAL
UNIT STATUS: NORMAL
UNIT TYPE ISBT: 7300
WBC # BLD AUTO: 10.2 10E3/UL (ref 4–11)

## 2022-08-16 PROCEDURE — 97162 PT EVAL MOD COMPLEX 30 MIN: CPT | Mod: GP

## 2022-08-16 PROCEDURE — 36415 COLL VENOUS BLD VENIPUNCTURE: CPT | Performed by: INTERNAL MEDICINE

## 2022-08-16 PROCEDURE — 250N000013 HC RX MED GY IP 250 OP 250 PS 637

## 2022-08-16 PROCEDURE — 250N000011 HC RX IP 250 OP 636: Performed by: INTERNAL MEDICINE

## 2022-08-16 PROCEDURE — P9016 RBC LEUKOCYTES REDUCED: HCPCS | Performed by: HOSPITALIST

## 2022-08-16 PROCEDURE — 97166 OT EVAL MOD COMPLEX 45 MIN: CPT | Mod: GO

## 2022-08-16 PROCEDURE — 85018 HEMOGLOBIN: CPT | Performed by: INTERNAL MEDICINE

## 2022-08-16 PROCEDURE — 82248 BILIRUBIN DIRECT: CPT | Performed by: INTERNAL MEDICINE

## 2022-08-16 PROCEDURE — 97535 SELF CARE MNGMENT TRAINING: CPT | Mod: GO

## 2022-08-16 PROCEDURE — 97530 THERAPEUTIC ACTIVITIES: CPT | Mod: GP

## 2022-08-16 PROCEDURE — 84484 ASSAY OF TROPONIN QUANT: CPT | Performed by: INTERNAL MEDICINE

## 2022-08-16 PROCEDURE — 210N000001 HC R&B IMCU HEART CARE

## 2022-08-16 PROCEDURE — 85027 COMPLETE CBC AUTOMATED: CPT | Performed by: INTERNAL MEDICINE

## 2022-08-16 PROCEDURE — 82310 ASSAY OF CALCIUM: CPT | Performed by: INTERNAL MEDICINE

## 2022-08-16 PROCEDURE — 99232 SBSQ HOSP IP/OBS MODERATE 35: CPT | Performed by: INTERNAL MEDICINE

## 2022-08-16 PROCEDURE — 250N000013 HC RX MED GY IP 250 OP 250 PS 637: Performed by: INTERNAL MEDICINE

## 2022-08-16 PROCEDURE — C9113 INJ PANTOPRAZOLE SODIUM, VIA: HCPCS | Performed by: INTERNAL MEDICINE

## 2022-08-16 RX ORDER — CEFTRIAXONE 1 G/1
1 INJECTION, POWDER, FOR SOLUTION INTRAMUSCULAR; INTRAVENOUS EVERY 24 HOURS
Status: DISCONTINUED | OUTPATIENT
Start: 2022-08-16 | End: 2022-08-17 | Stop reason: ALTCHOICE

## 2022-08-16 RX ORDER — POLYETHYLENE GLYCOL 3350 17 G/17G
238 POWDER, FOR SOLUTION ORAL ONCE
Status: COMPLETED | OUTPATIENT
Start: 2022-08-16 | End: 2022-08-16

## 2022-08-16 RX ORDER — BISACODYL 5 MG
10 TABLET, DELAYED RELEASE (ENTERIC COATED) ORAL ONCE
Status: COMPLETED | OUTPATIENT
Start: 2022-08-16 | End: 2022-08-16

## 2022-08-16 RX ADMIN — PIPERACILLIN AND TAZOBACTAM 3.38 G: 3; .375 INJECTION, POWDER, LYOPHILIZED, FOR SOLUTION INTRAVENOUS at 07:14

## 2022-08-16 RX ADMIN — BISACODYL 10 MG: 5 TABLET, COATED ORAL at 11:06

## 2022-08-16 RX ADMIN — CEFTRIAXONE SODIUM 1 G: 1 INJECTION, POWDER, FOR SOLUTION INTRAMUSCULAR; INTRAVENOUS at 13:20

## 2022-08-16 RX ADMIN — Medication: at 08:59

## 2022-08-16 RX ADMIN — PANTOPRAZOLE SODIUM 40 MG: 40 INJECTION, POWDER, FOR SOLUTION INTRAVENOUS at 08:58

## 2022-08-16 RX ADMIN — MICONAZOLE NITRATE: 20 POWDER TOPICAL at 09:00

## 2022-08-16 RX ADMIN — PANTOPRAZOLE SODIUM 40 MG: 40 INJECTION, POWDER, FOR SOLUTION INTRAVENOUS at 21:35

## 2022-08-16 RX ADMIN — POLYETHYLENE GLYCOL 3350 238 G: 17 POWDER, FOR SOLUTION ORAL at 17:44

## 2022-08-16 RX ADMIN — FLUTICASONE FUROATE 1 PUFF: 100 POWDER RESPIRATORY (INHALATION) at 09:00

## 2022-08-16 RX ADMIN — UMECLIDINIUM 1 PUFF: 62.5 AEROSOL, POWDER ORAL at 09:00

## 2022-08-16 ASSESSMENT — ACTIVITIES OF DAILY LIVING (ADL)
ADLS_ACUITY_SCORE: 47
ADLS_ACUITY_SCORE: 47
ADLS_ACUITY_SCORE: 43
ADLS_ACUITY_SCORE: 47
ADLS_ACUITY_SCORE: 49
ADLS_ACUITY_SCORE: 43
ADLS_ACUITY_SCORE: 47
ADLS_ACUITY_SCORE: 49
ADLS_ACUITY_SCORE: 47

## 2022-08-16 NOTE — PROVIDER NOTIFICATION
WALDEMAR Tate. Critical lab Hgb 6.3. Can you please place orders for blood? Thank you. Chaparrita SON [id number]    Robson Jaquez notified.    1 unit PRBC ordered.

## 2022-08-16 NOTE — PROGRESS NOTES
08/16/22 1630   Quick Adds   Type of Visit Initial Occupational Therapy Evaluation   Living Environment   People in Home alone   Current Living Arrangements assisted living   Home Accessibility no concerns   Living Environment Comments Pt lives in 3rd floor apartment in assisted living.   Self-Care   Usual Activity Tolerance moderate   Current Activity Tolerance fair   Equipment Currently Used at Home wheelchair, manual   Fall history within last six months no   Activity/Exercise/Self-Care Comment Pt inconsistent with ability to report level of assistance she received at [facility]. Pt initially reported IND with dressing, assistance with showering, and IND with transfers to W/C. Later in session, pt reported pt received help with EOB>supine bed mobility. Pt also reported she only receives meals from [facility] and no other assistance.   Instrumental Activities of Daily Living (IADL)   IADL Comments Pt eats meals provided by [facility].   General Information   Onset of Illness/Injury or Date of Surgery 08/14/22   Referring Physician Alberto Gay DO   Additional Occupational Profile Info/Pertinent History of Current Problem Per chart review, pt is a 88 year old female with past medical history of A. fib on Xarelto, CVA, LANRE, HTN who resides in assisted living facility, when staff came to check on her, found her in the bed with dark red blood saturating bedding and brought to ED for evaluation, found to have drop in hemoglobin, significant increase in creatinine compared to before and admitted for further management.   Existing Precautions/Restrictions fall   Cognitive Status Examination   Orientation Status orientation to person, place and time   Cognitive Status Comments Pt appeared to be confused at times throughout the session. Pt had decreased attention and difficulty with memory.   Range of Motion Comprehensive   General Range of Motion no range of motion deficits identified   Strength Comprehensive (MMT)   General Manual  Muscle Testing (MMT) Assessment no strength deficits identified   Bed Mobility   Bed Mobility scooting/bridging;sit-supine;supine-sit   Scooting/Bridging [place] (Bed Mobility) 2 person assist;maximum assist (25% patient effort)   Supine-Sit [place] (Bed Mobility) minimum assist (75% patient effort)   Sit-Supine [place] (Bed Mobility) moderate assist (50% patient effort)   Assistive Device (Bed Mobility) bed rails   Transfers   Transfers sit-stand transfer;toilet transfer   Sit-Stand Transfer   Sit-Stand [place] (Transfers) minimum assist (75% patient effort);moderate assist (50% patient effort);2 person assist   Assistive Device (Sit-Stand Transfers) walker, front-wheeled   Toilet Transfer   Toilet Transfer Comments Unable to attempt toilet transfer d/t dizziness.   Balance   Balance Comments Pt demonstrated decreased standing balance.   Activities of Daily Living   BADL Assessment/Intervention lower body dressing   Lower Body Dressing Assessment/Training   [place] Level (Lower Body Dressing) dependent (less than 25% patient effort)   Clinical Impression   Criteria for Skilled Therapeutic Interventions Met (OT) Yes, treatment indicated   OT Diagnosis Impaired ability to perform ADLs and functional mobility.   Influenced by the following impairments GI bleed   OT Problem List-Impairments impacting ADL problems related to;activity tolerance impaired;balance;cognition;mobility;strength;postural control   Assessment of Occupational Performance 3-5 Performance Deficits   Identified Performance Deficits bed mobility, lower body dressing, transfers, toileting   Planned Therapy Interventions (OT) ADL retraining;balance training;bed mobility training;cognition;transfer training;home program guidelines;progressive activity/exercise   Clinical Decision Making Complexity (OT) moderate complexity   Risk & Benefits of therapy have been explained evaluation/treatment results reviewed;care plan/treatment  goals reviewed;risks/benefits reviewed;current/potential barriers reviewed;participants voiced agreement with care plan;participants included;patient   Clinical Impression Comments Pt would benefit from skilled OT services to promote ability to perform ADLs and transfers.   OT Discharge Planning   OT Discharge Recommendation (DC Rec) home with assist;home with home care occupational therapy;Transitional Care Facility   OT Rationale for DC Rec Pt currently requires Ax1-2 with all transfers and ADLs. Pt lives in assisted living facility and could discharge back to [facility] if this level of assistance is available. If not available, recommending TCU as best option.   Total Evaluation Time (Minutes)   Total Evaluation Time (Minutes) 8   OT Goals   Therapy Frequency (OT) Daily   OT Predicted Duration/Target Date for Goal Attainment 08/23/22   OT Goals Lower Body Dressing;Bed Mobility;Transfers;Toilet Transfer/Toileting;Cognition   OT: Lower Body Dressing Supervision/stand-by assist;using adaptive equipment;including set-up/clothing retrieval   OT: Bed Mobility Modified independent;supine to/from sitting   OT: Transfer Supervision/stand-by assist;with assistive device   OT: Toilet Transfer/Toileting Supervision/stand-by assist;toilet transfer;cleaning and garment management;using adaptive equipment   OT: Cognitive Patient/caregiver will verbalize understanding of cognitive assessment results/recommendations as needed for safe discharge planning

## 2022-08-16 NOTE — PROGRESS NOTES
08/16/22 1602   Quick Adds   Type of Visit Initial PT Evaluation   Living Environment   People in Home alone   Current Living Arrangements assisted living  (Simultaneous filing. User may not have seen previous data.)   Home Accessibility no concerns  (Has an elevator to get to 3rd floor apartment)   Transportation Anticipated other (see comments)  (Patient states that she does not know how she will get home, maybe [employer])   Living Environment Comments Lives in 3rd floor apartment, has access to an elevator. Patient may be poor historian, per chart patient lives in assisted living, however patient states that she lives in independent living  (Simultaneous filing. User may not have seen previous data.)   Self-Care   Usual Activity Tolerance moderate   Current Activity Tolerance fair   Equipment Currently Used at Home wheelchair, manual   Fall history within last six months no   Activity/Exercise/Self-Care Comment Patient may be poor historian, states that she does not receive assistance with ADLs and mobility, then later states that she does require assistance for bed mobility and some ADLs   General Information   Onset of Illness/Injury or Date of Surgery 08/14/22   Referring Physician Alberto Gay, DO   Patient/Family Therapy Goals Statement (PT) To go home   Pertinent History of Current Problem (include personal factors and/or comorbidities that impact the POC) 88 year old female with past medical history of A. fib on Xarelto, CVA, LANRE, HTN who resides in assisted living facility, when staff came to check on her, found her in the bed with dark red blood saturating bedding and brought to ED for evaluation, found to have drop in hemoglobin, significant increase in creatinine compared to before and admitted for further management   Existing Precautions/Restrictions fall   Cognition   Affect/Mental Status (Cognition) confused   Orientation Status (Cognition) disoriented to;situation;place  (States  that she is at [facility], states that she is in the hospital due to kidney stones.)   Follows Commands (Cognition) WFL   Range of Motion (ROM)   Range of Motion ROM deficits secondary to weakness   Strength (Manual Muscle Testing)   Strength (Manual Muscle Testing) strength is WFL   Bed Mobility   Bed Mobility supine-sit   Supine-Sit [place] (Bed Mobility) minimum assist (75% patient effort);1 person to manage equipment;verbal cues   Bed Mobility Limitations decreased ability to use arms for pushing/pulling;decreased ability to use legs for bridging/pushing   Impairments Contributing to Impaired Bed Mobility decreased strength   Assistive Device (Bed Mobility) bed rails   Transfers   Transfers sit-stand transfer   Transfer Safety Concerns Noted losing balance backward   Impairments Contributing to Impaired Transfers decreased strength;impaired balance   Sit-Stand Transfer   Sit-Stand [place] (Transfers) moderate assist (50% patient effort);2 person assist   Assistive Device (Sit-Stand Transfers) walker, front-wheeled   Gait/Stairs (Locomotion)   Comment, (Gait/Stairs) Patient wheelchair bound at baseline   Sensory Examination   Sensory Perception WNL   Clinical Impression   Criteria for Skilled Therapeutic Intervention Yes, treatment indicated   PT Diagnosis (PT) Impaired functional mobility   Influenced by the following impairments Weakness   Functional limitations due to impairments Bed mobility, transfers   Clinical Presentation (PT Evaluation Complexity) Evolving/Changing   Clinical Presentation Rationale Patient presents as medically diagnosed   Clinical Decision Making (Complexity) moderate complexity   Planned Therapy Interventions (PT) bed mobility training;patient/family education;strengthening;transfer training;wheelchair management/propulsion training   Anticipated Equipment Needs at Discharge (PT) wheelchair;walker, rolling   Risk & Benefits of therapy have been explained evaluation/treatment  results reviewed;participants voiced agreement with care plan;participants included;patient   PT Discharge Planning   PT Discharge Recommendation (DC Rec) home with assist;home with home care physical therapy;Transitional Care Facility   PT Rationale for DC Rec Patient currenlty requires moderate assistance of 1-2 for bed mobility and transfers. Per chart patient lives in assisted living; however, patient reports at times that she does and does not receive assistance with mobility and ADLs, unable to clearly determine how much assist patient has access to at home. If patient is to discharge home she would benefit from 24 hr assist, moderate assistance of 1-2, and use of an assistive device for safe mobility. If this level of assistance can not be provided at home, recommend patient discharging to TCU to improve strength and independence with functional mobility.   Total Evaluation Time   Total Evaluation Time (Minutes) 10   Physical Therapy Goals   PT Frequency Daily   PT Predicted Duration/Target Date for Goal Attainment 08/23/22   PT Goals Bed Mobility;Transfers   PT: Bed Mobility Minimal assist;Supine to/from sit   PT: Transfers Minimal assist;Sit to/from stand;Assistive device     Tatyana Garcia DPT

## 2022-08-16 NOTE — PROGRESS NOTES
"GI PROGRESS NOTE  8/16/2022  Bessy Sevilla  7/22/1934  /-60    Subjective:   Patient denies any abdominal pain, SOB, nausea, vomiting, fever, or chills. Nursing states patient continues to have black loose stools.      Objective:     Blood pressure 133/60, pulse 78, temperature 97.7  F (36.5  C), temperature source Oral, resp. rate 18, height 1.702 m (5' 7\"), weight 90.5 kg (199 lb 8 oz), SpO2 99 %.    Body mass index is 31.25 kg/m .  Gen: NAD, pleasantly confused  GI: Non-distended, BS positive, soft, non-tender    Laboratory:    BMP  Recent Labs   Lab 08/16/22  0428 08/15/22  0555 08/14/22  0721    139 138   POTASSIUM 4.0 4.3 4.9   CHLORIDE 115* 111* 104   MOHINDER 7.7* 8.1* 8.9   CO2 19* 20* 18*   BUN 40* 46* 38*   CR 1.60* 1.86* 1.89*    100 112     CBC  Recent Labs   Lab 08/16/22  0428 08/15/22  1930 08/15/22  1614 08/15/22  0555 08/14/22  0631 08/14/22  0331   WBC 10.2  --   --  17.8*  --  20.6*   RBC 2.02*  --   --  2.32*  --  3.64*   HGB 6.3* 7.9* 8.0* 6.6*   < > 10.6*   HCT 19.5*  --   --  20.3*  --  33.6*   MCV 97  --   --  88  --  92   MCH 31.2  --   --  28.4  --  29.1   MCHC 32.3  --   --  32.5  --  31.5   RDW 16.2*  --   --  15.8*  --  15.4*   PLT 96*  --   --  202  --  396    < > = values in this interval not displayed.     INR  Recent Labs   Lab 08/15/22  0932 08/14/22  0331   INR 1.35* 2.04*      LFTs  Recent Labs   Lab Test 08/16/22  0428 08/15/22  0555 08/14/22  1228 08/14/22  0331 03/21/22  1630 01/10/22  1502   ALBUMIN 2.0* 2.4*  --  3.1*  --   --    BILITOTAL 0.3 0.2  --  0.3  --   --    ALT 30 39  --  51*  --   --    AST 37 52*  --  110*  --   --    PROTEIN  --   --  100 *  --  20 * 100 *     Imaging:  EXAM: CTA ABDOMEN PELVIS WITH CONTRAST  LOCATION: [facility]  DATE/TIME: 8/15/2022 5:07 PM     INDICATION: Red rectal bleeding today anemia  COMPARISON: 8/14/2022  TECHNIQUE: CT angiogram abdomen pelvis during arterial phase of injection of IV " contrast. 2D and 3D MIP reconstructions were performed by the CT technologist. Dose reduction techniques were used.  CONTRAST: isovue 370 75ml     FINDINGS:  ANGIOGRAM ABDOMEN/PELVIS: No evidence of active extravasation of contrast. Tortuous atherosclerotic aorta. No dissection or aneurysm. Patent celiac, superior mesenteric, renal, and inferior mesenteric arteries. Patent portal and hepatic veins.     LOWER CHEST: Mildly enlarged heart with coronary artery disease.     HEPATOBILIARY: Status post cholecystectomy.     PANCREAS: Normal.     SPLEEN: Normal.     ADRENAL GLANDS: Normal.     KIDNEYS/BLADDER: Left-sided ureteral stent. Rajput catheter with inflated balloon within bladder. 10.2 x 6.3 cm simple left renal cyst, no follow-up required. Bilateral renal cortical scarring. No obstructing renal or ureteral stones.     BOWEL: Diverticulosis. No diverticulitis, colitis, or obstruction. No free air or fluid.     LYMPH NODES: Normal.     PELVIC ORGANS: Absent uterus.     MUSCULOSKELETAL: L3-L5 posterior fusion. T10 and L1 unchanged compression deformities. Bilateral total hip arthroplasties. Bilateral sacroiliitis.                                                                   IMPRESSION:  1.  No CT evidence of active GI bleed. If clinical concern persists, consider evaluation with tagged red blood cell study.  2.  Atherosclerotic aorta. No obstruction, colitis, or diverticulitis.  3.  Left ureteral stent and Rajput catheter in situ. No hydroureteronephrosis.  4.  Diverticulosis. No diverticulitis, colitis, or obstruction.  5.  Status post hysterectomy and cholecystectomy.     Assessment:   1. GI Bleeding  2. Anemia  88-year-old female with history of Afib on Xarelto therapy, HTN, LANRE, CVA who presented from the [facility] for GI bleeding. CTA on admit did not note any evidence of active GI bleeding. Repeat CTA with no evidence of active bleeding. Patient and family members unsure of last colonoscopy.   -HGB down trending  to 6.3 this AM     Plan:   1. Trend Hgb  2. Transfuse per primary  3. EGD and Colonoscopy tomorrow, NPO at midnight but okay for clear liquids today.   4. No mag citrate for colon prep due to elevated creatinine  5. Continue to hold Xarelto  6. GI will continue to follow, please call with questions and concerns.     Discussed with Dr. Josiah Hamilton, CNP  Thank you for the opportunity to participate in the care of this patient.   Please feel free to call me with any questions or concerns.  Phone number [phone].

## 2022-08-16 NOTE — PROGRESS NOTES
A/P    88 year old female with past medical history of A. fib on Xarelto, CVA, LANRE, HTN who resides in assisted living facility, when staff came to check on her, found her in the bed with dark red blood saturating bedding and brought to ED for evaluation, found to have drop in hemoglobin, significant increase in creatinine compared to before and admitted for further management.     Acute GI bleeding in the setting of anticoagulation; suspect diverticular bleeding but UGIB not excluded  Acute blood loss anemia due to GI bleed;  --On admission, CTA abdomen pelvis with no evidence of active GI bleeding  --Repeat CT angiogram abdomen/pelvis due to GI bleeding 8/15 no active bleeding -  --Hold home Xarelto  --IV PPI BID  -NPO [facility] tonight for EGD/Colonoscopy in a.m.    -- GI following    Acute left hydroureteronephrosis: Status post left ureteral stent on 8/14,022  Acute kidney injury on CKD stage III; improved.  Creatinine 2.28 on admission down to 1.6 currently.  Metabolic acidosis; improving. Current Hyperchloremic acidosis due to 0.9%NS IVF.  Mild hyperkalemia; resolved  -stop Zosyn/Vanco  -IV CTX  - Strict intake and output and daily weights  - Avoid nephrotoxic medications  - Rajput  -Urology following  -SL IVF  -bmp a.m.    Complicated Klebsiella pneumoniae UTI:  -IV CTX  -BC's NGTD    Acute blood loss anemia: Secondary to problem #1  - Total of 6 units of packed red blood cells transfused  - Xarelto on hold  - Work-up as above  - Serial hemoglobins     Essential hypertension;  -- hold Losartan due to GI bleed/MELONY and normotensive BP's     History of PAF;  -- Heart rate controlled.  Not on chronic AVN blocking drugs  --Home Xarelto on hold due to GI bleed     History of CVA with left-sided weakness, wheelchair-bound per previous medical record;  --Xarelto on hold due to GI bleeding  -- PT OT      Hx of COPD; not on exacerbation; home inhalers. IS     GI prophylaxis; IV PPI  DVT prophylaxis; SCDs, no pharmacological  "agent given GI bleeding    Full code    Greater than 2-day stay        Subjective: Afebrile.  Events overnight noted.    Objective:  /63 (BP Location: Right arm)   Pulse 92   Temp 99.1  F (37.3  C) (Oral)   Resp 22   Ht 1.702 m (5' 7\")   Wt 90.5 kg (199 lb 8 oz)   SpO2 99%   BMI 31.25 kg/m    Physical Examination:   General appearance - alert, and in no distress  Eyes -  sclera anicteric  Mouth - mucous membranes moist, pharynx normal without lesions  Lungs -decreased at the bases, no wheezes, rales or rhonchi, symmetric air entry  Heart - normal rate, regular rhythm, normal S1, S2, no murmurs, rubs, clicks or gallops. No peripheral edema.  Rajput with yellow urine  Abdomen - soft, nontender, nondistended, no masses or organomegaly, BS+  Neurological - alert, oriented, cooperative  Skin -C/C/P    Lab/imaging studies reviewed       "

## 2022-08-16 NOTE — PROGRESS NOTES
"  [facility] - POSTOP PROGRESS NOTE    PLACE OF SERVICE:  [facility]     SURGERY: POD #2 after cystoscopy, left retrograde pyelogram, left ureteral stent placement, cystogram by Dr Hilario Viveros for left hydronephrosis      SUBJECTIVE:   Events: no acute events overnight    Patient reports she is feeling ok today. Denies abdominal and bilateral flank pain, fever, chills. No problems with alex catheter. Pt reports she is interested in maintaining alex catheter chronically as she has such a difficult time keeping up with pull up/pad changes for her complete urinary incontinence.     OBJECTIVE:  PHYSICAL EXAM  Vital signs:  Temp: 99.1  F (37.3  C) Temp src: Oral BP: 135/63 Pulse: 92   Resp: 22 SpO2: 99 % O2 Device: None (Room air) Oxygen Delivery: 1.5 LPM Height: 170.2 cm (5' 7\") Weight: 90.5 kg (199 lb 8 oz)  Estimated body mass index is 31.25 kg/m  as calculated from the following:    Height as of this encounter: 1.702 m (5' 7\").    Weight as of this encounter: 90.5 kg (199 lb 8 oz).    General: NAD, alert, cooperative  Abdomen: Soft, non-tender, no bilateral CVA tenderness, no SP fullness or tenderness.  : 16 fr catheter in place draining clear mitesh urine, good urine output.     LABS:  INR   Date Value Ref Range Status   08/15/2022 1.35 (H) 0.85 - 1.15 Final      Lab Results   Component Value Date    WBC 17.8 08/15/2022     Lab Results   Component Value Date    HGB 6.6 08/15/2022     Lab Results   Component Value Date     08/15/2022     Creatinine   Date Value Ref Range Status   08/16/2022 1.60 (H) 0.60 - 1.10 mg/dL Final     Sodium   Date Value Ref Range Status   08/16/2022 140 136 - 145 mmol/L Final     Potassium   Date Value Ref Range Status   08/16/2022 4.0 3.5 - 5.0 mmol/L Final     Magnesium   Date Value Ref Range Status   08/15/2022 2.1 1.8 - 2.6 mg/dL Final     CULTURES:  Blood x 2 8/14/22: NGTD  Urine 8/14/22: >100,000 CFU/mL Klebsiella pneumoniae   Susceptibility     Klebsiella " pneumoniae     NAUN     Ampicillin >=32 ug/mL Resistant 1     Ampicillin/ Sulbactam 4 ug/mL Susceptible     Cefazolin <=4 ug/mL Susceptible 2     Cefepime <=1 ug/mL Susceptible     Cefoxitin <=4 ug/mL Susceptible     Ceftazidime <=1 ug/mL Susceptible     Ceftriaxone <=1 ug/mL Susceptible     Ciprofloxacin <=0.25 ug/mL Susceptible     Gentamicin <=1 ug/mL Susceptible     Levofloxacin <=0.12 ug/mL Susceptible     Nitrofurantoin 64 ug/mL Intermediate     Piperacillin/Tazobactam <=4 ug/mL Susceptible     Tobramycin <=1 ug/mL Susceptible     Trimethoprim/Sulfamethoxazole <=1/19 ug/mL Susceptible      Lab Results: personally reviewed.     ASSESSMENT/PLAN:  POD #2 after cystoscopy, left retrograde pyelogram, left ureteral stent placement, cystogram by Dr Hilario Viveros for left hydronephrosis in setting of MELONY and emphysematous cystitis     - Tolerating stent, no pain.   - Creatinine improving, 1.60 today, 1.86 on 8/15, 1.89 pre-op 8/14, baseline 1.17 on 4/22/22. Avoid nephrotoxins. Hydration per primary team. Monitor.   - Urine culture: >100,000 CFU/mL Klebsiella pneumoniae. Urine now clear mitesh. BC's: NGTD. Last fever 100.5 on 8/14/22 afternoon. Receiving IV ceftriaxone. Will defer antibiotic management to primary team. Recommend 10 day total course.   - Hx of chronic urinary incontinence, recurrent UTIs, OAB. After discussion with patient, plan to maintain alex at hospital discharge, likely chronically as management of urinary incontinence has been challenging for her. Will need monthly exchanges. Email sent 8/15 to reschedule appt with Dominique Grady PA-C within 2-3 weeks and notified GABRIELE Grady. [facility].  [facility] contact info placed in discharge orders.   - Email also sent to arrange for cysto/left stent exchange in approximately 4 months per Dr. Viveros's request.   - Plan discussed with patient's daughter, Rani, by phone and she is in agreement.   - [facility] will sign off. Please re-consult with any  new or worsening urologic concerns.      Discussed patient with Dr Hilario Rudd, APRN, CNP  [facility]   [phone]

## 2022-08-16 NOTE — PLAN OF CARE
Patient is alert and orientated.  This morning she was confused to her location, but has since improved.  Patient is pleasant.  Patient has a GI bleed and will have a colonoscopy tomorrow.  The patient had two bloody stools that were large in amount.  One unit of blood was given this morning.  Next hemoglobin level will be checked this afternoon.    Problem: Plan of Care - These are the overarching goals to be used throughout the patient stay.    Goal: Absence of Hospital-Acquired Illness or Injury  Outcome: Ongoing, Progressing  Intervention: Identify and Manage Fall Risk  Recent Flowsheet Documentation  Taken 8/16/2022 1345 by Dallas Rivera    Problem: Adjustment to Illness (Gastrointestinal Bleeding)  Goal: Optimal Coping with Acute Illness  Outcome: Ongoing, Not Progressing   , RN  Safety Promotion/Fall Prevention:   bed alarm on   clutter free environment maintained   fall prevention program maintained   lighting adjusted   nonskid shoes/slippers when out of bed   room door open  Taken 8/16/2022 0840 by Dallas Rivera RN  Safety Promotion/Fall Prevention:   bed alarm on   assistive device/personal items within reach   clutter free environment maintained   fall prevention program maintained   lighting adjusted   nonskid shoes/slippers when out of bed   room door open  Intervention: Prevent Skin Injury  Recent Flowsheet Documentation  Taken 8/16/2022 1345 by Dallas Rivera RN  Body Position: supine  Taken 8/16/2022 1339 by Dallas Rivera RN  Body Position: head repositioned, left  Taken 8/16/2022 0840 by Dallas Rivera RN  Body Position: supine  Intervention: Prevent and Manage VTE (Venous Thromboembolism) Risk  Recent Flowsheet Documentation  Taken 8/16/2022 1345 by Dallas Rivera RN  Activity Management: activity adjusted per tolerance  Taken 8/16/2022 1339 by Dallas Rivera RN  Activity Management: bedrest  Taken 8/16/2022 1117 by Dallas Rivera RN  VTE Prevention/Management: SCDs  (sequential compression devices) on  Taken 8/16/2022 0840 by Dallas Rivera, RN  Activity Management: bedrest     Goldy Rivera, RN

## 2022-08-16 NOTE — PLAN OF CARE
Assumed care 1500 to 0730. A&O x 2, disoriented to time and situation. Assist x 2, team lift. Tele is NSR. Denies pain. Rajput in place, bag below bladder. Incontinence and Rajput care provided, linen and gown changed. Up to bedpan. Pt had three black, loose stools this shift. Call light within reach, able to make needs known. Bed alarm on for safety.    Problem: Risk for Delirium  Goal: Improved Sleep  Outcome: Ongoing, Progressing

## 2022-08-17 ENCOUNTER — APPOINTMENT (OUTPATIENT)
Dept: OCCUPATIONAL THERAPY | Facility: HOSPITAL | Age: 87
DRG: 988 | End: 2022-08-17
Payer: COMMERCIAL

## 2022-08-17 ENCOUNTER — APPOINTMENT (OUTPATIENT)
Dept: PHYSICAL THERAPY | Facility: HOSPITAL | Age: 87
DRG: 988 | End: 2022-08-17
Payer: COMMERCIAL

## 2022-08-17 ENCOUNTER — ANESTHESIA (OUTPATIENT)
Dept: SURGERY | Facility: HOSPITAL | Age: 87
DRG: 988 | End: 2022-08-17
Payer: COMMERCIAL

## 2022-08-17 DIAGNOSIS — K92.2 GASTROINTESTINAL HEMORRHAGE, UNSPECIFIED GASTROINTESTINAL HEMORRHAGE TYPE: ICD-10-CM

## 2022-08-17 DIAGNOSIS — I48.0 PAROXYSMAL ATRIAL FIBRILLATION (H): Primary | ICD-10-CM

## 2022-08-17 PROBLEM — E83.42 HYPOMAGNESEMIA: Status: ACTIVE | Noted: 2022-08-17

## 2022-08-17 PROBLEM — D62 ANEMIA DUE TO BLOOD LOSS, ACUTE: Status: ACTIVE | Noted: 2022-08-17

## 2022-08-17 PROBLEM — N30.00 ACUTE CYSTITIS WITHOUT HEMATURIA: Status: ACTIVE | Noted: 2022-08-17

## 2022-08-17 LAB
ANION GAP SERPL CALCULATED.3IONS-SCNC: 4 MMOL/L (ref 5–18)
BLD PROD TYP BPU: NORMAL
BLOOD COMPONENT TYPE: NORMAL
BUN SERPL-MCNC: 26 MG/DL (ref 8–28)
CALCIUM SERPL-MCNC: 8.1 MG/DL (ref 8.5–10.5)
CHLORIDE BLD-SCNC: 113 MMOL/L (ref 98–107)
CO2 SERPL-SCNC: 22 MMOL/L (ref 22–31)
CODING SYSTEM: NORMAL
COLONOSCOPY: NORMAL
CREAT SERPL-MCNC: 1.3 MG/DL (ref 0.6–1.1)
CROSSMATCH: NORMAL
GFR SERPL CREATININE-BSD FRML MDRD: 39 ML/MIN/1.73M2
GLUCOSE BLD-MCNC: 88 MG/DL (ref 70–125)
HGB BLD-MCNC: 6.7 G/DL (ref 11.7–15.7)
HGB BLD-MCNC: 7 G/DL (ref 11.7–15.7)
HGB BLD-MCNC: 7 G/DL (ref 11.7–15.7)
HGB BLD-MCNC: 8.4 G/DL (ref 11.7–15.7)
ISSUE DATE AND TIME: NORMAL
MAGNESIUM SERPL-MCNC: 1.7 MG/DL (ref 1.8–2.6)
POTASSIUM BLD-SCNC: 3.8 MMOL/L (ref 3.5–5)
SODIUM SERPL-SCNC: 139 MMOL/L (ref 136–145)
UNIT ABO/RH: NORMAL
UNIT NUMBER: NORMAL
UNIT STATUS: NORMAL
UNIT TYPE ISBT: 7300
UPPER GI ENDOSCOPY: NORMAL

## 2022-08-17 PROCEDURE — 120N000004 HC R&B MS OVERFLOW

## 2022-08-17 PROCEDURE — P9016 RBC LEUKOCYTES REDUCED: HCPCS | Performed by: INTERNAL MEDICINE

## 2022-08-17 PROCEDURE — 97530 THERAPEUTIC ACTIVITIES: CPT | Mod: GO

## 2022-08-17 PROCEDURE — 999N000141 HC STATISTIC PRE-PROCEDURE NURSING ASSESSMENT: Performed by: INTERNAL MEDICINE

## 2022-08-17 PROCEDURE — 36415 COLL VENOUS BLD VENIPUNCTURE: CPT | Performed by: INTERNAL MEDICINE

## 2022-08-17 PROCEDURE — 250N000013 HC RX MED GY IP 250 OP 250 PS 637: Performed by: INTERNAL MEDICINE

## 2022-08-17 PROCEDURE — 97129 THER IVNTJ 1ST 15 MIN: CPT | Mod: GO

## 2022-08-17 PROCEDURE — 370N000017 HC ANESTHESIA TECHNICAL FEE, PER MIN: Performed by: INTERNAL MEDICINE

## 2022-08-17 PROCEDURE — 80048 BASIC METABOLIC PNL TOTAL CA: CPT | Performed by: INTERNAL MEDICINE

## 2022-08-17 PROCEDURE — 97110 THERAPEUTIC EXERCISES: CPT | Mod: GP

## 2022-08-17 PROCEDURE — 85018 HEMOGLOBIN: CPT | Performed by: INTERNAL MEDICINE

## 2022-08-17 PROCEDURE — 272N000001 HC OR GENERAL SUPPLY STERILE: Performed by: INTERNAL MEDICINE

## 2022-08-17 PROCEDURE — 97535 SELF CARE MNGMENT TRAINING: CPT | Mod: GO

## 2022-08-17 PROCEDURE — 83735 ASSAY OF MAGNESIUM: CPT | Performed by: INTERNAL MEDICINE

## 2022-08-17 PROCEDURE — 258N000003 HC RX IP 258 OP 636: Performed by: ANESTHESIOLOGY

## 2022-08-17 PROCEDURE — 360N000075 HC SURGERY LEVEL 2, PER MIN: Performed by: INTERNAL MEDICINE

## 2022-08-17 PROCEDURE — 97530 THERAPEUTIC ACTIVITIES: CPT | Mod: GP

## 2022-08-17 PROCEDURE — 250N000011 HC RX IP 250 OP 636: Performed by: INTERNAL MEDICINE

## 2022-08-17 PROCEDURE — C9113 INJ PANTOPRAZOLE SODIUM, VIA: HCPCS | Performed by: INTERNAL MEDICINE

## 2022-08-17 PROCEDURE — 99233 SBSQ HOSP IP/OBS HIGH 50: CPT | Performed by: INTERNAL MEDICINE

## 2022-08-17 PROCEDURE — 250N000011 HC RX IP 250 OP 636: Performed by: NURSE ANESTHETIST, CERTIFIED REGISTERED

## 2022-08-17 PROCEDURE — 0DJ08ZZ INSPECTION OF UPPER INTESTINAL TRACT, VIA NATURAL OR ARTIFICIAL OPENING ENDOSCOPIC: ICD-10-PCS | Performed by: INTERNAL MEDICINE

## 2022-08-17 PROCEDURE — 0DJD8ZZ INSPECTION OF LOWER INTESTINAL TRACT, VIA NATURAL OR ARTIFICIAL OPENING ENDOSCOPIC: ICD-10-PCS | Performed by: INTERNAL MEDICINE

## 2022-08-17 PROCEDURE — 99222 1ST HOSP IP/OBS MODERATE 55: CPT | Performed by: GENERAL ACUTE CARE HOSPITAL

## 2022-08-17 RX ORDER — SODIUM CHLORIDE, SODIUM LACTATE, POTASSIUM CHLORIDE, CALCIUM CHLORIDE 600; 310; 30; 20 MG/100ML; MG/100ML; MG/100ML; MG/100ML
INJECTION, SOLUTION INTRAVENOUS CONTINUOUS
Status: DISCONTINUED | OUTPATIENT
Start: 2022-08-17 | End: 2022-08-17 | Stop reason: HOSPADM

## 2022-08-17 RX ORDER — FENTANYL CITRATE 50 UG/ML
25 INJECTION, SOLUTION INTRAMUSCULAR; INTRAVENOUS EVERY 5 MIN PRN
Status: DISCONTINUED | OUTPATIENT
Start: 2022-08-17 | End: 2022-08-17 | Stop reason: HOSPADM

## 2022-08-17 RX ORDER — NALOXONE HYDROCHLORIDE 0.4 MG/ML
0.2 INJECTION, SOLUTION INTRAMUSCULAR; INTRAVENOUS; SUBCUTANEOUS
Status: DISCONTINUED | OUTPATIENT
Start: 2022-08-17 | End: 2022-08-18 | Stop reason: HOSPADM

## 2022-08-17 RX ORDER — NALOXONE HYDROCHLORIDE 0.4 MG/ML
0.4 INJECTION, SOLUTION INTRAMUSCULAR; INTRAVENOUS; SUBCUTANEOUS
Status: DISCONTINUED | OUTPATIENT
Start: 2022-08-17 | End: 2022-08-18 | Stop reason: HOSPADM

## 2022-08-17 RX ORDER — CEFDINIR 300 MG/1
300 CAPSULE ORAL EVERY 12 HOURS SCHEDULED
Status: DISCONTINUED | OUTPATIENT
Start: 2022-08-18 | End: 2022-08-18 | Stop reason: HOSPADM

## 2022-08-17 RX ORDER — MAGNESIUM SULFATE HEPTAHYDRATE 40 MG/ML
2 INJECTION, SOLUTION INTRAVENOUS ONCE
Status: COMPLETED | OUTPATIENT
Start: 2022-08-17 | End: 2022-08-17

## 2022-08-17 RX ORDER — ONDANSETRON 2 MG/ML
4 INJECTION INTRAMUSCULAR; INTRAVENOUS
Status: DISCONTINUED | OUTPATIENT
Start: 2022-08-17 | End: 2022-08-18 | Stop reason: HOSPADM

## 2022-08-17 RX ORDER — LIDOCAINE 40 MG/G
CREAM TOPICAL
Status: DISCONTINUED | OUTPATIENT
Start: 2022-08-17 | End: 2022-08-18 | Stop reason: HOSPADM

## 2022-08-17 RX ORDER — SODIUM CHLORIDE, SODIUM LACTATE, POTASSIUM CHLORIDE, CALCIUM CHLORIDE 600; 310; 30; 20 MG/100ML; MG/100ML; MG/100ML; MG/100ML
INJECTION, SOLUTION INTRAVENOUS CONTINUOUS
Status: DISCONTINUED | OUTPATIENT
Start: 2022-08-17 | End: 2022-08-17 | Stop reason: ALTCHOICE

## 2022-08-17 RX ORDER — PROPOFOL 10 MG/ML
INJECTION, EMULSION INTRAVENOUS PRN
Status: DISCONTINUED | OUTPATIENT
Start: 2022-08-17 | End: 2022-08-17

## 2022-08-17 RX ORDER — FLUMAZENIL 0.1 MG/ML
0.2 INJECTION, SOLUTION INTRAVENOUS
Status: ACTIVE | OUTPATIENT
Start: 2022-08-17 | End: 2022-08-17

## 2022-08-17 RX ORDER — OXYCODONE HYDROCHLORIDE 5 MG/1
5 TABLET ORAL EVERY 4 HOURS PRN
Status: DISCONTINUED | OUTPATIENT
Start: 2022-08-17 | End: 2022-08-17 | Stop reason: HOSPADM

## 2022-08-17 RX ORDER — PROPOFOL 10 MG/ML
INJECTION, EMULSION INTRAVENOUS CONTINUOUS PRN
Status: DISCONTINUED | OUTPATIENT
Start: 2022-08-17 | End: 2022-08-17

## 2022-08-17 RX ORDER — ONDANSETRON 2 MG/ML
4 INJECTION INTRAMUSCULAR; INTRAVENOUS EVERY 30 MIN PRN
Status: DISCONTINUED | OUTPATIENT
Start: 2022-08-17 | End: 2022-08-17 | Stop reason: HOSPADM

## 2022-08-17 RX ORDER — ONDANSETRON 4 MG/1
4 TABLET, ORALLY DISINTEGRATING ORAL EVERY 30 MIN PRN
Status: DISCONTINUED | OUTPATIENT
Start: 2022-08-17 | End: 2022-08-17 | Stop reason: HOSPADM

## 2022-08-17 RX ADMIN — UMECLIDINIUM 1 PUFF: 62.5 AEROSOL, POWDER ORAL at 12:40

## 2022-08-17 RX ADMIN — ATORVASTATIN CALCIUM 20 MG: 10 TABLET, FILM COATED ORAL at 21:50

## 2022-08-17 RX ADMIN — MAGNESIUM SULFATE IN WATER 2 G: 40 INJECTION, SOLUTION INTRAVENOUS at 09:36

## 2022-08-17 RX ADMIN — FLUTICASONE FUROATE 1 PUFF: 100 POWDER RESPIRATORY (INHALATION) at 12:39

## 2022-08-17 RX ADMIN — PROPOFOL 75 MCG/KG/MIN: 10 INJECTION, EMULSION INTRAVENOUS at 07:44

## 2022-08-17 RX ADMIN — SODIUM CHLORIDE, POTASSIUM CHLORIDE, SODIUM LACTATE AND CALCIUM CHLORIDE: 600; 310; 30; 20 INJECTION, SOLUTION INTRAVENOUS at 06:40

## 2022-08-17 RX ADMIN — Medication: at 12:54

## 2022-08-17 RX ADMIN — CEFTRIAXONE SODIUM 1 G: 1 INJECTION, POWDER, FOR SOLUTION INTRAMUSCULAR; INTRAVENOUS at 13:47

## 2022-08-17 RX ADMIN — PROPOFOL 40 MG: 10 INJECTION, EMULSION INTRAVENOUS at 07:41

## 2022-08-17 RX ADMIN — PROPOFOL 20 MG: 10 INJECTION, EMULSION INTRAVENOUS at 07:44

## 2022-08-17 RX ADMIN — LOSARTAN POTASSIUM 100 MG: 50 TABLET, FILM COATED ORAL at 12:51

## 2022-08-17 RX ADMIN — PANTOPRAZOLE SODIUM 40 MG: 40 INJECTION, POWDER, FOR SOLUTION INTRAVENOUS at 12:37

## 2022-08-17 ASSESSMENT — ACTIVITIES OF DAILY LIVING (ADL)
WALKING_OR_CLIMBING_STAIRS_DIFFICULTY: OTHER (SEE COMMENTS)
ADLS_ACUITY_SCORE: 49
TRANSFERRING: 1-->ASSISTANCE (EQUIPMENT/PERSON) NEEDED
DRESS: 0-->INDEPENDENT
ADLS_ACUITY_SCORE: 49
ADLS_ACUITY_SCORE: 38
ADLS_ACUITY_SCORE: 43
ADLS_ACUITY_SCORE: 49
WEAR_GLASSES_OR_BLIND: YES
DRESS: 0-->INDEPENDENT
ADLS_ACUITY_SCORE: 38
DOING_ERRANDS_INDEPENDENTLY_DIFFICULTY: NO
CONCENTRATING,_REMEMBERING_OR_MAKING_DECISIONS_DIFFICULTY: NO
TOILETING_ISSUES: NO
FALL_HISTORY_WITHIN_LAST_SIX_MONTHS: NO
BATHING: 1-->ASSISTANCE NEEDED
TRANSFERRING: 0-->ASSISTANCE NEEDED (DEVELOPMENTALLY APPROPRIATE)
DIFFICULTY_EATING/SWALLOWING: NO
DRESSING/BATHING_DIFFICULTY: YES
ADLS_ACUITY_SCORE: 49
ADLS_ACUITY_SCORE: 43
EQUIPMENT_CURRENTLY_USED_AT_HOME: WHEELCHAIR, MANUAL
ADLS_ACUITY_SCORE: 43
ADLS_ACUITY_SCORE: 47
ADLS_ACUITY_SCORE: 47
CHANGE_IN_FUNCTIONAL_STATUS_SINCE_ONSET_OF_CURRENT_ILLNESS/INJURY: NO
DRESSING/BATHING: BATHING DIFFICULTY, ASSISTANCE 1 PERSON
ADLS_ACUITY_SCORE: 47

## 2022-08-17 ASSESSMENT — ENCOUNTER SYMPTOMS: DYSRHYTHMIAS: 1

## 2022-08-17 NOTE — PLAN OF CARE
Goal Outcome Evaluation:    Plan of Care Reviewed With: patient     Overall Patient Progress: improving    Outcome Evaluation: no further blood stools, no abdominal pain or nausea.      Problem: Plan of Care - These are the overarching goals to be used throughout the patient stay.    Goal: Readiness for Transition of Care  Outcome: Ongoing, Progressing   At baseline does not ambulate but does transfer from bed to chair or commode independently.  Tolerating clear liquids, anxious to return home.

## 2022-08-17 NOTE — ANESTHESIA CARE TRANSFER NOTE
Patient: Bessy Sevilla    Procedure: Procedure(s):  ESOPHAGOGASTRODUODENOSCOPY  COLONOSCOPY       Diagnosis: Gastrointestinal hemorrhage, unspecified gastrointestinal hemorrhage type [K92.2]  Diagnosis Additional Information: No value filed.    Anesthesia Type:   MAC     Note:    Oropharynx: oropharynx clear of all foreign objects and spontaneously breathing  Level of Consciousness: awake  Oxygen Supplementation: room air  Level of Supplemental Oxygen (L/min / FiO2): 0  Independent Airway: airway patency satisfactory and stable  Dentition: dentition unchanged    Report to RN Given: handoff report given  Patient transferred to: Medical/Surgical Unit    Handoff Report: Identifed the Patient, Identified the Reponsible Provider, Reviewed the pertinent medical history, Discussed the surgical course, Reviewed Intra-OP anesthesia mangement and issues during anesthesia, Set expectations for post-procedure period and Allowed opportunity for questions and acknowledgement of understanding      Vitals:  Vitals Value Taken Time   /78 0819   Temp 36 0819   Pulse 91 0819   Resp     SpO2 97 0819       Electronically Signed By: ARMEN Miguel CRNA  August 17, 2022  8:18 AM

## 2022-08-17 NOTE — PROCEDURES
See linked EGD and colonoscopy report for details      Imp:    Suspect GI bleeding related to diverticulosis.  No signs of ischemia or malignancy.  EGd normal.    Rec:    Stop Xeralto , if able  ADAT  Transfuse prn or consider IV iron  Would not pursue small bowel PillCam    Discussed with daughter, Rani, over the telephone.    We will sign off.  Thanks.    Dallas Rahman MD  [facility]

## 2022-08-17 NOTE — INTERVAL H&P NOTE
[facility] Pre-Procedure Note    Reason for procedure: Rectal bleeding with transfusion requirements.  Xeralto before admit.  Consent obtained from patients daughter, Rani, who requests call after procedure.    History and Physical Reviewed: Reviewed linked H&P--no changes since that exam.    Pre-sedation assessment:    General: alert, appears stated age, and cooperative; comfortable  Airway: normal  Heart: regular rate and rhythm  Lungs: clear to auscultation bilaterally  Abdomen:  NT/ND; normal bowel sounds    Sedation Plan based on assessment: Moderate    Mallampati score: Class II (visualization of the soft palate, fauces, and uvula)          ASA Classification: ASA 3 - Patient with moderate systemic disease with functional limitations    Impression: Patient deemed adequate candidate for conscious sedation.    Risks, benefits and alternatives were discussed with the patient and informed consent was obtained.    Plan: colonoscopy and esophagogastroduodenoscopy              Dallas Rahman MD  Thank you for the opportunity to participate in the care of this patient.   Please feel free to call me with any questions or concerns.  Phone number [phone].

## 2022-08-17 NOTE — PLAN OF CARE
Assumed care 1900 to 0730. A&O x 4. Assist x 2, team lift. WC @ baseline. Tele is NSR with frequent Bigeminy. Denies pain. NPO @ 0000. Finished bowel prep. Rajput in place, bag below bladder. Rajput cares completed this shift. Four large bloody stools this shift. Call light within reach, able to make needs known. Bed alarm on for safety.    Problem: Bleeding (Gastrointestinal Bleeding)  Goal: Hemostasis  Outcome: Ongoing, Progressing     Problem: Adjustment to Illness (Gastrointestinal Bleeding)  Goal: Optimal Coping with Acute Illness  Outcome: Ongoing, Progressing

## 2022-08-17 NOTE — ANESTHESIA POSTPROCEDURE EVALUATION
Patient: Bessy Sevilla    Procedure: Procedure(s):  ESOPHAGOGASTRODUODENOSCOPY  COLONOSCOPY       Anesthesia Type:  MAC    Note:  Disposition: Inpatient   Postop Pain Control: Uneventful            Sign Out: Well controlled pain   PONV: No   Neuro/Psych: Uneventful            Sign Out: Acceptable/Baseline neuro status   Airway/Respiratory: Uneventful            Sign Out: Acceptable/Baseline resp. status   CV/Hemodynamics: Uneventful            Sign Out: Acceptable CV status; No obvious hypovolemia; No obvious fluid overload   Other NRE: NONE   DID A NON-ROUTINE EVENT OCCUR?            Last vitals:  Vitals:    08/17/22 0501 08/17/22 0617 08/17/22 0852   BP: (!) 170/71 (!) 187/78 (!) 158/68   Pulse: 77 75 79   Resp: 20 20 18   Temp: 36.8  C (98.3  F) 36.9  C (98.5  F) 37  C (98.6  F)   SpO2: 94% 95% 96%       Electronically Signed By: Obed Bearden MD  August 17, 2022  9:02 AM

## 2022-08-17 NOTE — CONSULTS
"     We have been asked to see Bessy Sevilla at [facility] by Dr. Sybil Gregory for management of anticoagulation.    Impression and Plan     Assessment:  1. Acute gastrointestinal bleeding possibly due to diverticular bleeding. No active bleeding seen on upper and lower endoscopy 8/17/2022. She has a prior history of diverticular bleeding with coil embolization of two branches of the right colic artery on 5/28/2019.  2. Paroxysmal atrial fibrillation seen on ambulatory cardiac monitoring in early 2014. [id number] score is at least 6 (hypertension, age 75 or greater, prior stroke, female gender).  3. 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  4. Nonobstructive coronary artery disease seen on coronary CT angiogram 6/18/2009.  5. Essential hypertension.  6. Hyperlipidemia.  7. Obstructive sleep apnea on home CPAP.  8. Morbid obesity.    Plan:    Agree with holding antiplatelet and anticoagulant therapy at this time.    We can have her see our Atrial Fibrillation clinic as an outpatient to discuss options for thromboembolic prophylaxis including resuming oral anticoagulation and/or consideration of a Watchman left atrial appendage closure device.    Atorvastatin 20 mg daily.    Primary Cardiologist: none    Clinically Significant Risk Factors Present on Admission     # Obesity: Estimated body mass index is 31.78 kg/m  as calculated from the following:    Height as of this encounter: 1.702 m (5' 7\").    Weight as of this encounter: 92 kg (202 lb 14.4 oz).      Cardiovascular: Cardiac Arrhythmia: Atrial fibrillation: Paroxysmal    GI Hemorrhage: Gastrointestinal hemorrhage, unspecified    Systemic: Chronic Fatigue and Other Debilities: Other reduced mobility       History of Present Illness      Ms. Bessy Sevilla is a 88 year old female with a history of paroxysmal diagnosed on event monitor in 2014 on Xarelto, right cerebellar CVA " 11/6/2013 suspected to be cardioembolic from AF, nonobstructive CAD seen on coronary CT angiogram 6/18/2009, HTN, HLD, and LANRE on CPAP admitted with GI bleed. She was admitted from her assisted living facility after her bedding was found soaked in blood. She does not recall having any symptoms.    On arrival to the ED, she was hypotensive. Labs showed an MELONY with creatinine 1.89 and hemoglobin as low as 6.6. She has been transfused at least 3 units PRBCs. She had EGD and colonoscopy which showed no sign of bleeding. She did have a prior diverticular bleeding with coil embolization of the right colic artery 5/28/2019    She does very little for physical activity but denies chest pain, shortness of breath, or palpitations.    Review of Systems:  Further review of systems is otherwise negative/noncontributory (based on review of medical record (admission H&P) and 13 point review of systems reviewed. Pertinent positives noted).    Cardiac Diagnostics     ECG 8/15/2022 (personally reviewed and interpreted): SR, normal ECG    Echocardiogram 8/15/2022 (results reviewed):   Left ventricular size, wall motion and function are normal. The ejection  fraction is 55-60%.  The right ventricle is moderately dilated.  Mildly decreased right ventricular systolic function  The left atrium is mild to moderately dilated.  No hemodynamically significant valvular abnormalities on 2D or color flow  imaging. The study was technically difficult.    Coronary CT angiography 6/18/2009 (results reviewed):   1. Calcium score of 483 with fairly diffuse atherosclerosis of all three coronary arteries.   2. 25-50% proximal LAD plaque is noted along with a 25% proximal OM1 plaque and a 25-50% proximal RCA plaque. There does not appear to be evidence for significant obstructive coronary artery disease, however, the overall quality of this study is quite poor due to the patient's body habitus.   3. Normal LV function as stated above.    Cardiac Stress  Testing 6/3/2009 (results reviewed):   I  Dobutamine ECG  1.  Dobutamine ECG was negative for diagnostic ST segment depression.   2.  The patinet did not develop chest pain with dobutamine before and during the test.     II Myocardial perfusion Scintigraphy:   1.  Myocardial perfusion scintigraphy revealed moderate sized defect with mild severity in inferior wall with normalization o  the rest scan. This would be consistent with a moderate area of ischemia in the inferior/inferoseptal wall. This finding is a reduced specificity due to the adjacent bowel artifact.   2.  Gated images revealed cavity did appear to dilate post dobutamine infusion.   3.  Ejection fraction was 56% with normal wall motion.     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 treated with radiation and implant   Extrinsic asthma without status asthmaticus (HRC)       Mixed hyperlipidemia (HRC)       Senile nuclear sclerosis 10/31/2008     Macular puckering of retina 10/31/2008     Cervical cancer (HRC)   s/p radiation and radium therapy   LANRE (obstructive sleep apnea)       Cardiovascular malfunction arising from mental factors (HRC)       Asthma attack (HRC)       Heartburn       High cholesterol       Other urinary problems       Glaucoma       Ureteral obstruction, left 8/29/2021 Added automatically from request for surgery [id number]   Vocal cord dysfunction 2/26/2019     Vitamin D deficiency 3/3/2010     Status post shoulder joint replacement 4/30/2010     Spinal stenosis, other region 5/6/2005 SPINAL STENOSIS-cervical   S/P rotator cuff surgery 7/12/2010     S/P lumbar fusion 6/4/2012     S/P laparoscopic cholecystectomy 8/29/2021     Right sided sciatica (HRC) 5/13/2014     Pseudophakia 2/9/2011 Pseudophakia s/p KPE-IOL OD 1/4/11; OS 2/1/11 @[facility]    Presbyopia 2010     Postnasal drip 2020     Pilonidal disease 2014     Personal history of malignant neoplasm of female genital organ 10/16/2006     Paroxysmal A-fib (HRC) 3/18/2014 Found on Holter monitor, done after she suffered a stroke.    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 ; MALIG NEOPLASM ENDOCERVIX(aka CERVICAL)   Localized osteoarthritis of hand 2011 Epic    HTN (hypertension) (HRC) 2010     History of total hip arthroplasty 2012     Hemorrhoid 2016     Gastroesophageal reflux disease 2018     Esophageal reflux 5/15/2007     Elevated LFTs 2021     Diverticular hemorrhage 2019     Dyslipidemia (HRC) 2021     DDD (degenerative disc disease), lumbar (HRC) 2014     Chronic venous insufficiency 2013     Atypical chest pain 2009  nuc-mod inferior ischemia, LVEF 56% 2009 echo LVEF 50%, normal LV size, mild-mod LAE, no valve problems 2009 calcium score/coronary CT--Ca score 483. Non obstructive CAD. 25-50% LAD, RCA. Minimal plaque Circ/OM. LVEF normal.     Past Surgical History:   Procedure Laterality Date     COMBINED CYSTOSCOPY, RETROGRADES, EXCHANGE STENT URETER(S) Left 2022    Procedure: CYSTOSCOPY, WITH RETROGRADE PYELOGRAM AND URETERAL STENT REPLACEMENT;  Surgeon: Hilario Viveros MD;  Location: [facility]     IR ABSCESS DRAIN INSERTION  2016     LAPAROSCOPIC CHOLECYSTECTOMY  2016     LAPAROSCOPIC CHOLECYSTECTOMY N/A 2016    Procedure: CHOLECYSTECTOMY LAPAROSCOPIC;  Surgeon: Brianna Cast MD;  Location: [facility];  Service:      SPINAL FUSION       Alcohol/Drug Abuse Birth Father        Cataract Birth Mother     "    Cataract Brother        Cancer, Colon Maternal Grandfather        Coronary Artery Disease Other    uncles   Coronary Artery Disease Paternal Grandfather        Cancer Sister    cervical   Glaucoma Negative Family History        Macular Degeneration Negative Family History             Social History     Socioeconomic History     Marital status: Single     Spouse name: Not on file     Number of children: Not on file     Years of education: Not on file     Highest education level: Not on file   Occupational History     Not on file   Tobacco Use     Smoking status: Never Smoker     Smokeless tobacco: Never Used   Substance and Sexual Activity     Alcohol use: No     Drug use: No     Sexual activity: Not on file   Other Topics Concern     Not on file   Social History Narrative     Not on file     Social Determinants of Health     Financial Resource Strain: Not on file   Food Insecurity: Not on file   Transportation Needs: Not on file   Physical Activity: Not on file   Stress: Not on file   Social Connections: Not on file   Intimate Partner Violence: Not on file   Housing Stability: Not on file             Physical Examination   VITALS: BP (!) (P) 165/75   Pulse (P) 77   Temp (P) 98.8  F (37.1  C)   Resp (P) 18   Ht 1.702 m (5' 7\")   Wt 92 kg (202 lb 14.4 oz)   SpO2 (P) 96%   BMI 31.78 kg/m    BMI: Body mass index is 31.78 kg/m .  Wt Readings from Last 3 Encounters:   08/17/22 92 kg (202 lb 14.4 oz)   07/26/16 118.2 kg (260 lb 8 oz)     Intake/Output Summary (Last 24 hours) at 8/17/2022 1649  Last data filed at 8/17/2022 1400  Gross per 24 hour   Intake 360 ml   Output 1800 ml   Net -1440 ml       General Appearance:  Alert, cooperative, no distress, appears stated age    Head:  Normocephalic, without obvious abnormality, atraumatic   Eyes:  PERRL, conjunctiva/corneas clear, EOM's intact   Ears:  Normal external ear canals bilaterally   Nose: Nares normal, septum midline, no drainage   Throat: Lips, mucosa, and " tongue normal; teeth and gums normal   Neck: Supple, symmetrical, trachea midline, no adenopathy, no carotid bruit or JVD   Back:   Symmetric, no abnormal curvature, ROM normal   Lungs:   Clear to auscultation bilaterally, respirations unlabored   Chest Wall:  No tenderness or deformity   Heart:  Regular rate and rhythm, S1, S2 normal,no murmur, rub or gallop   Abdomen:   Soft, non-tender, bowel sounds active all four quadrants,  no masses, no organomegaly   Extremities: Extremities normal, atraumatic, no cyanosis or edema   Skin: Skin color, texture, turgor normal, no rashes or lesions   Psychiatric: Normal affect, pleasant and cooperative   Neurologic: Alert and oriented X 3, Moves all 4 extremities            Imaging      CTA abdomen/pelvis 8/15/2022 (report reviewed):  1.  No CT evidence of active GI bleed. If clinical concern persists, consider evaluation with tagged red blood cell study.  2.  Atherosclerotic aorta. No obstruction, colitis, or diverticulitis.  3.  Left ureteral stent and Rajput catheter in situ. No hydroureteronephrosis.  4.  Diverticulosis. No diverticulitis, colitis, or obstruction.  5.  Status post hysterectomy and cholecystectomy.    Head/neck MRI/MRI 11/6/2013 (report reviewed):  Head MRI:   1. Acute to early subacute right PICA distribution infarcts within the medial right cerebellar hemisphere.  Mild cytotoxic edema with minimal mass effect on the right cerebellar hemisphere and fourth ventricle which remains patent.   2. No other areas restricted diffusion, space occupying hemorrhage, or abnormal enhancement.   3. Cerebral and cerebellar volume loss and presumed chronic small vessel ischemic changes.     MRA head:   1. Slightly limited by patient motion without high-grade stenosis or major branch occlusion in the anterior circulation. The basilar artery and left vertebral artery widely patent. Small caliber distal right vertebral artery with diminished flow related enhancement.    MRA  neck:   1. Extremely tortuous carotid and vertebral arteries throughout the neck. There are a  few areas of segmental narrowing of the upper right cervical vertebral artery which is markedly tortuous. The vessel tapers to small caliber along its V4 segment. Findings are indeterminate for dissection. CTA could be helpful to clarify.   2. Early bifurcation of the common carotid arteries bilaterally, right lower than left. Internal carotid arteries are widely patent without significant narrowing by NASCET criteria   3. Kissing internal carotid arteries in the mid neck.    Head/neck CTA 11/8/2013 (report reviewed):  CT BRAIN WITHOUT CONTRAST:   1. Small area of infarct is again seen within the inferomedial aspect the right cerebellar hemisphere. No finding to suggest hemorrhagictransformation.   2. No finding to suggest intracranial mass or hemorrhage.     CTA [place] OF [place]:   1. Dominant left vertebral artery. Right vertebral artery is small in caliber but patent with a distal fenestration.   2. There is a PICA-AICA variant on the right which appears occluded distally.   3. Anterior circulation unremarkable.     CTA OF THE NECK:   1. Both cervical internal carotid arteries are tortuous, crossing the midline in the mid to upper cervical region.   2. No significant stenosis in either cervical carotid system by modified NASCET criteria. Both cervical vertebral arteries unremarkable.   3. Multiple bilateral thyroid nodules consistent with multinodular goiter.     Lab Results    Chemistry/lipid CBC Cardiac Enzymes/BNP/TSH/INR     Recent Labs   Lab Test 08/17/22  0623      POTASSIUM 3.8   CHLORIDE 113*   CO2 22   GLC 88   BUN 26   CR 1.30*   GFRESTIMATED 39*   MOHINDER 8.1*     Recent Labs   Lab Test 08/17/22  0623 08/16/22  0428 08/15/22  0555   CR 1.30* 1.60* 1.86*          Recent Labs   Lab Test 08/17/22  1216 08/16/22  1150 08/16/22  0428   WBC  --   --  10.2   HGB 6.7*   < > 6.3*   HCT  --   --  19.5*   MCV  --   --   97   PLT  --   --  96*    < > = values in this interval not displayed.     Recent Labs   Lab Test 08/17/22  1216 08/17/22  0623 08/17/22  0017   HGB 6.7* 7.0* 7.0*    Recent Labs   Lab Test 08/16/22  0428 08/15/22  2214 04/22/22  1638   TROPONINI 0.07 0.09 0.01       Recent Labs   Lab Test 08/15/22  0932 08/14/22  0331   INR 1.35* 2.04*           Current Inpatient Scheduled Medications   Scheduled Meds:    atorvastatin  20 mg Oral At Bedtime     [START ON 8/18/2022] cefdinir  300 mg Oral Q12H NATHALIA (08/20)     emollient   Topical Daily     fluticasone  1 puff Inhalation Daily     losartan  100 mg Oral Daily     miconazole   Topical Daily     sodium chloride (PF)  3 mL Intracatheter Q8H     sodium chloride (PF)  3 mL Intracatheter Q8H     umeclidinium  1 puff Inhalation Daily     [Held by provider] vitamin B-12  500 mcg Oral Daily            Medications Prior to Admission   Prior to Admission medications    Medication Sig Start Date End Date Taking? Authorizing Provider   acetaminophen (TYLENOL) 500 MG tablet Take 1,000 mg by mouth 3 times daily as needed for mild pain   Yes Unknown, Entered By History   albuterol (PROVENTIL HFA;VENTOLIN HFA) 90 mcg/actuation inhaler Inhale 2 puffs into the lungs every 4 hours as needed 7/18/16  Yes Provider, Historical   ascorbic acid, vitamin C, (VITAMIN C) 500 MG tablet Take 1,000 mg by mouth daily 7/18/16  Yes Provider, Historical   atorvastatin (LIPITOR) 20 MG tablet Take 20 mg by mouth At Bedtime 7/18/16  Yes Provider, Historical   beclomethasone HFA (QVAR REDIHALER) 80 MCG/ACT inhaler Inhale 2 puffs into the lungs 2 times daily   Yes Unknown, Entered By History   benzonatate (TESSALON) 100 MG capsule Take 100 mg by mouth 3 times daily as needed for cough   Yes Unknown, Entered By History   cetirizine (ZYRTEC) 5 MG tablet Take 5 mg by mouth daily   Yes Unknown, Entered By History   clotrimazole (LOTRIMIN) 1 % external cream Apply topically 2 times daily To rash on left arm until  healed   Yes Unknown, Entered By History   cyanocobalamin 500 MCG tablet Take 1 tablet by mouth daily 7/18/16  Yes Provider, Historical   DULoxetine (CYMBALTA) 60 MG capsule [DULOXETINE (CYMBALTA) 60 MG CAPSULE] Take 60 mg by mouth daily. 7/18/16  Yes Provider, Historical   emollient (VANICREAM) external cream Apply topically daily To right lower extremity   Yes Unknown, Entered By History   estradiol (ESTRACE) 0.1 MG/GM vaginal cream Place 1 g vaginally three times a week   Yes Unknown, Entered By History   hydrocortisone (CORTAID) 1 % external cream Apply topically 2 times daily as needed Apply to rash on arm as needed   Yes Unknown, Entered By History   ipratropium-albuterol (COMBIVENT RESPIMAT)  MCG/ACT inhaler Inhale 1 puff into the lungs 4 times daily as needed for shortness of breath / dyspnea or wheezing   Yes Unknown, Entered By History   loperamide (IMODIUM) 2 MG capsule Take 2 mg by mouth 3 times daily as needed for diarrhea   Yes Unknown, Entered By History   losartan (COZAAR) 100 MG tablet Take 100 mg by mouth daily   Yes Unknown, Entered By History   Menthol, Topical Analgesic, (BIOFREEZE) 4 % GEL Externally apply 1 Application topically 2 times daily To the right side of the neck   Yes Unknown, Entered By History   nitroGLYcerin (NITROSTAT) 0.4 MG sublingual tablet Place 0.4 mg under the tongue every 5 minutes as needed for chest pain For chest pain place 1 tablet under the tongue every 5 minutes for 3 doses. If symptoms persist 5 minutes after 1st dose call 911.   Yes Unknown, Entered By History   nystatin (MYCOSTATIN) 442765 UNIT/GM external powder Apply topically daily Apply powder to reddened area on abdominal folds and bilateral breasts until redness has gone   Yes Unknown, Entered By History   pyridOXINE (VITAMIN B6) 100 MG TABS Take 200 mg by mouth daily 7/18/16  Yes Provider, Historical   rivaroxaban ANTICOAGULANT (XARELTO) 15 MG TABS tablet Take 15 mg by mouth daily 7/18/16  Yes  Provider, Historical   senna-docusate (SENOKOT-S/PERICOLACE) 8.6-50 MG tablet Take 1 tablet by mouth At Bedtime   Yes Unknown, Entered By History   senna-docusate (SENOKOT-S/PERICOLACE) 8.6-50 MG tablet Take 1 tablet by mouth 2 times daily as needed for constipation   Yes Unknown, Entered By History   tiotropium (SPIRIVA RESPIMAT) 2.5 MCG/ACT inhaler Inhale 2 puffs into the lungs daily   Yes Unknown, Entered By History   traZODone (DESYREL) 50 MG tablet Take 75 mg by mouth At Bedtime 7/18/16  Yes Provider, Historical   vitamin D3 (CHOLECALCIFEROL) 50 mcg (2000 units) tablet Take 2 tablets by mouth daily   Yes Unknown, Entered By History          Augustus Pineda MD [facility]  Non-Invasive Cardiologist  [facility]  Pager [phone]

## 2022-08-17 NOTE — ANESTHESIA PREPROCEDURE EVALUATION
Anesthesia Pre-Procedure Evaluation    Patient: Bessy Sevilla   MRN: [id number] : 1934        Procedure : Procedure(s):  ESOPHAGOGASTRODUODENOSCOPY  COLONOSCOPY          History reviewed. No pertinent past medical history.   Past Surgical History:   Procedure Laterality Date     COMBINED CYSTOSCOPY, RETROGRADES, EXCHANGE STENT URETER(S) Left 2022    Procedure: CYSTOSCOPY, WITH RETROGRADE PYELOGRAM AND URETERAL STENT REPLACEMENT;  Surgeon: Hilario Viveros MD;  Location[facility]     IR ABSCESS DRAIN INSERTION  2016     LAPAROSCOPIC CHOLECYSTECTOMY  2016     LAPAROSCOPIC CHOLECYSTECTOMY N/A 2016    Procedure: CHOLECYSTECTOMY LAPAROSCOPIC;  Surgeon: Brianna Cast MD;  Location: [facility];  Service:      SPINAL FUSION        Allergies   Allergen Reactions     Sulfa (Sulfonamide Antibiotics) [Sulfa Drugs] Swelling      Social History     Tobacco Use     Smoking status: Never Smoker     Smokeless tobacco: Never Used   Substance Use Topics     Alcohol use: No      Wt Readings from Last 1 Encounters:   22 92 kg (202 lb 14.4 oz)        Anesthesia Evaluation   Pt has had prior anesthetic.     No history of anesthetic complications       ROS/MED HX  ENT/Pulmonary:     (+) asthma     Neurologic:  - neg neurologic ROS   (+) dementia,     Cardiovascular:     (+) Dyslipidemia hypertension-----dysrhythmias, a-fib,     METS/Exercise Tolerance: >4 METS    Hematologic:     (+) anemia,     Musculoskeletal:  - neg musculoskeletal ROS     GI/Hepatic:  - neg GI/hepatic ROS     Renal/Genitourinary:     (+) renal disease, type: CRI,     Endo:     (+) Obesity (morbid),     Psychiatric/Substance Use:  - neg psychiatric ROS     Infectious Disease:  - neg infectious disease ROS     Malignancy:  - neg malignancy ROS     Other:  - neg other ROS          Physical Exam    Airway  airway exam normal      Mallampati: II   TM distance: > 3 FB   Neck ROM: full   Mouth opening: > 3  cm    Respiratory Devices and Support         Dental       (+) missing      Cardiovascular   cardiovascular exam normal          Pulmonary   pulmonary exam normal                OUTSIDE LABS:  CBC:   Lab Results   Component Value Date    WBC 10.2 08/16/2022    WBC 17.8 (H) 08/15/2022    HGB 7.0 (L) 08/17/2022    HGB 7.0 (L) 08/17/2022    HCT 19.5 (L) 08/16/2022    HCT 20.3 (L) 08/15/2022    PLT 96 (L) 08/16/2022     08/15/2022     BMP:   Lab Results   Component Value Date     08/16/2022     08/15/2022    POTASSIUM 4.0 08/16/2022    POTASSIUM 4.3 08/15/2022    CHLORIDE 115 (H) 08/16/2022    CHLORIDE 111 (H) 08/15/2022    CO2 19 (L) 08/16/2022    CO2 20 (L) 08/15/2022    BUN 40 (H) 08/16/2022    BUN 46 (H) 08/15/2022    CR 1.60 (H) 08/16/2022    CR 1.86 (H) 08/15/2022     08/16/2022     08/15/2022     COAGS:   Lab Results   Component Value Date    PTT 32 08/14/2022    INR 1.35 (H) 08/15/2022     POC: No results found for: BGM, HCG, HCGS  HEPATIC:   Lab Results   Component Value Date    ALBUMIN 2.0 (L) 08/16/2022    PROTTOTAL 4.2 (L) 08/16/2022    ALT 30 08/16/2022    AST 37 08/16/2022    ALKPHOS 52 08/16/2022    BILITOTAL 0.3 08/16/2022     OTHER:   Lab Results   Component Value Date    LACT 1.4 08/15/2022    MOHINDER 7.7 (L) 08/16/2022    PHOS 4.6 (H) 08/15/2022    MAG 2.1 08/15/2022       Anesthesia Plan    ASA Status:  3   NPO Status:  NPO Appropriate    Anesthesia Type: MAC.     - Reason for MAC: straight local not clinically adequate   Induction: Propofol.   Maintenance: TIVA.        Consents    Anesthesia Plan(s) and associated risks, benefits, and realistic alternatives discussed. Questions answered and patient/representative(s) expressed understanding.    - Discussed:     - Discussed with:  Patient, Legal Guardian      - Extended Intubation/Ventilatory Support Discussed: No.      - Patient is DNR/DNI Status: No    Use of blood products discussed: No .     Postoperative Care    Pain  management: IV analgesics, Multi-modal analgesia, Oral pain medications.   PONV prophylaxis: Ondansetron (or other 5HT-3)     Comments:                Obed Bearden MD

## 2022-08-17 NOTE — PROGRESS NOTES
[facility]    PROGRESS NOTE - Hospitalist Service    Assessment and Plan    Principal Problem:    Gastrointestinal hemorrhage, unspecified gastrointestinal hemorrhage type  Active Problems:    Essential hypertension    Obstructive lung disease (H)    Atrial fibrillation (H)    MELONY (acute kidney injury) (H)    Left hydrocele    Hydronephrosis of left kidney    Acute cystitis without hematuria    Anemia due to blood loss, acute    Hypomagnesemia    Bessy Sevilla is a 88 year old female with h/o afib on Xarelto, CVA, LANRE, HTN who resides in assisted living facility, when staff came to check on her, found her in the bed with dark red blood saturating bedding and brought to ED for evaluation, found to have drop in hemoglobin, significant increase in creatinine      Acute GI bleeding in the setting of anticoagulation;  -- Per history, assisted living facility staff found patient in her bed with dark red blood saturating bedding   On admission hemoglobin 10.6, dropped to 9.2 after IV fluid bolus.  -On admission CTA abdomen pelvis with no evidence of active GI bleeding  - EGD and colonoscopy no acute bleeding suspect diverticulosis and recommend stopping xarelto   - Hgb still low but no further bleeding   - Discussed with daughter Rani and would like cardiology consult to review anticoagulation      Acute blood loss anemia due to GI bleed;  - transfuse for HGB less then 7. Getting another unit today   -no further bleeding today and vitals stable         Acute kidney injury on CKD stage III;  Metabolic acidosis; likely due to MELONY and GI bleed,Hyperkalemia resolved   Abnormal CT with gas within the bladder, concern for cystitis and also some abnormal finding on left renal pelvis and left ureter  -- Creatinine improving with IV hydration, continue IV fluid   -- Avoid nephrotoxic medications   -- Monitor labs, intake/output, weight closely  -- Appreciate urology input, Status post left ureteral  stent on 8/14,022  - IV zosyn deescalated to IV ceftriaxone and sensitivities return, changed to oral cefdinir tomorrow for 10 days total     Hypomagnesia  - getting IV mag and recheck in am     Essential hypertension;  - on home BP meds and decreasedIVF  - monitor vital      History of PAF;  -- Heart rate controlled and in sinus  - Xarelto held and per GI recommend stopping  dicussed with daughter who would like cardiology consult given PAF and h/o CVA in the past      History of CVA with left-sided weakness, wheelchair-bound per previous medical record;  - PT/OT  - patient transfers on her own and per nurse at baseline      Hx of COPD; not on exacerbation; home inhalers. IS    COVID-19 PCR Results    COVID-19 PCR Results 12/22/20 4/22/21 1/17/22 1/24/22 1/27/22 1/31/22 1/31/22 2/3/22 8/14/22         1100 1100     COVID-19 Virus by PCR (External Result)  Not Detected          SARS-CoV-2 Virus Specimen Source Nasopharyngeal           SARS-CoV-2 PCR Result NEGATIVE           SARS CoV2 PCR   Negative   Testing sent to reference lab. Results will be returned via unsolicited result  Negative Negative   COVID-19 Virus PCR - Result    NOT DETECTED NOT DETECTED  NOT DETECTED        Comments are available for some flowsheets but are not being displayed.         COVID-19 Antibody Results, Testing for Immunity    COVID-19 Antibody Results, Testing for Immunity   No data to display.            VTE prophylaxis:  Pneumatic Compression Devices  DIET: Orders Placed This Encounter      Advance Diet as Tolerated: Clear Liquid Diet    Drains/Lines: none  Weight bearing status: wheelchair bound, no therapy needed patient tranfers on her own  Disposition/Barriers to discharge: monitor overnight and Hgb stable and no further bleeding can discharge back to facility   Code Status: Full Code    Subjective:  No SOB, CP, very pleasant and no complaints. Discussed with daughter Rani who is POA     PHYSICAL EXAM  Temp:  [97.8  F (36.6   C)-99.1  F (37.3  C)] 98.9  F (37.2  C)  Pulse:  [] 81  Resp:  [16-22] 16  BP: (135-187)/(63-95) 173/74  SpO2:  [94 %-99 %] 97 %  Wt Readings from Last 1 Encounters:   08/17/22 92 kg (202 lb 14.4 oz)       Intake/Output Summary (Last 24 hours) at 8/17/2022 0847  Last data filed at 8/17/2022 0500  Gross per 24 hour   Intake 2004 ml   Output 1800 ml   Net 204 ml      Body mass index is 31.78 kg/m .    Physical Exam  Constitutional:       General: She is not in acute distress.     Appearance: She is obese.   HENT:      Head: Normocephalic and atraumatic.   Cardiovascular:      Rate and Rhythm: Normal rate and regular rhythm.      Pulses: Normal pulses.      Comments: Soft SARA  Pulmonary:      Effort: Pulmonary effort is normal.      Breath sounds: Normal breath sounds.   Abdominal:      General: Bowel sounds are normal.      Palpations: Abdomen is soft.   Musculoskeletal:         General: Normal range of motion.      Comments: BLE ankle edema   Skin:     General: Skin is warm and dry.      Capillary Refill: Capillary refill takes less than 2 seconds.   Neurological:      Mental Status: She is alert and oriented to person, place, and time. Mental status is at baseline.      Comments: leftsided weakness       PERTINENT LABS/IMAGING:  Results for orders placed or performed during the hospital encounter of 08/14/22   CTA Abdomen Pelvis with Contrast    Impression    IMPRESSION:   1.  No CT evidence of acute gastrointestinal bleeding.  2.  Gas within the urinary bladder. In the absence of any recent instrumentation, this may reflect underlying cystitis. Correlation urinalysis recommended.  3.  Capacious appearance of the left renal pelvis and left ureter, similar to prior and likely a chronic finding. Discrete obstructing stone or mass lesion is not identified. Of note, the distal left ureter is difficult to assess, due to extensive beam   hardening and streak artifact from bilateral hip arthroplasties.  4.   Age-indeterminate, bilateral sacroiliitis.  5.  Diverticulosis.  6.  Small, nonobstructing right renal stone.  7.  Small noncalcified pulmonary nodules, measuring no greater than 5 mm. Follow-up is recommended according to Fleischner criteria, as detailed below.    Fleischner Society Recommendations for Pulmonary Nodules    Nodule size less than 6 mm:     Single or multiple nodules:     Nodules < 6 mm do not require routine follow-up, but certain patients at high risk with suspicious nodule morphology, upper lobe location, or both may warrant 12-month follow-up.   CTA Abdomen Pelvis with Contrast    Impression    IMPRESSION:  1.  No CT evidence of active GI bleed. If clinical concern persists, consider evaluation with tagged red blood cell study.  2.  Atherosclerotic aorta. No obstruction, colitis, or diverticulitis.  3.  Left ureteral stent and Rajput catheter in situ. No hydroureteronephrosis.  4.  Diverticulosis. No diverticulitis, colitis, or obstruction.  5.  Status post hysterectomy and cholecystectomy.   Echocardiogram Complete   Result Value Ref Range    LVEF  55-60%        Recent Labs   Lab 08/17/22  1216 08/17/22  0623 08/17/22  0017 08/16/22  1150 08/16/22  0428 08/15/22  1614 08/15/22  0932 08/15/22  0555 08/14/22  0631 08/14/22  0331   WBC  --   --   --   --  10.2  --   --  17.8*  --  20.6*   HGB 6.7* 7.0* 7.0*   < > 6.3*   < >  --  6.6*   < > 10.6*   MCV  --   --   --   --  97  --   --  88  --  92   PLT  --   --   --   --  96*  --   --  202  --  396   INR  --   --   --   --   --   --  1.35*  --   --  2.04*   NA  --  139  --   --  140  --   --  139   < > 138   POTASSIUM  --  3.8  --   --  4.0  --   --  4.3   < > 5.2*   CHLORIDE  --  113*  --   --  115*  --   --  111*   < > 101   CO2  --  22  --   --  19*  --   --  20*   < > 14*   BUN  --  26  --   --  40*  --   --  46*   < > 37*   CR  --  1.30*  --   --  1.60*  --   --  1.86*   < > 2.28*   ANIONGAP  --  4*  --   --  6  --   --  8   < > 23*   MOHINDER  --   8.1*  --   --  7.7*  --   --  8.1*   < > 9.6   GLC  --  88  --   --  102  --   --  100   < > 214*   ALBUMIN  --   --   --   --  2.0*  --   --  2.4*  --  3.1*   PROTTOTAL  --   --   --   --  4.2*  --   --  4.9*  --  7.5   BILITOTAL  --   --   --   --  0.3  --   --  0.2  --  0.3   ALKPHOS  --   --   --   --  52  --   --  57  --  114   ALT  --   --   --   --  30  --   --  39  --  51*   AST  --   --   --   --  37  --   --  52*  --  110*    < > = values in this interval not displayed.     No results found for this or any previous visit (from the past 24 hour(s)).  No results for input(s): CHOL, HDL, LDL, TRIG, CHOLHDLRATIO in the last 19743 hours.  No results for input(s): LDL in the last 75521 hours.  Recent Labs   Lab Test 08/17/22  0623      POTASSIUM 3.8   CHLORIDE 113*   CO2 22   GLC 88   BUN 26   CR 1.30*   GFRESTIMATED 39*   MOHINDER 8.1*     No results for input(s): A1C in the last 95843 hours.  Recent Labs   Lab Test 08/17/22  0623 08/17/22  0017 08/16/22  1809   HGB 7.0* 7.0* 7.4*     Recent Labs   Lab Test 08/16/22  0428 08/15/22  2214 04/22/22  1638   TROPONINI 0.07 0.09 0.01     No results for input(s): BNP, NTBNPI, NTBNP in the last 44958 hours.  No results for input(s): TSH in the last 19905 hours.  Recent Labs   Lab Test 08/15/22  0932 08/14/22  0331   INR 1.35* 2.04*      >100,000 CFU/mL Klebsiella pneumoniae Abnormal             Resulting Agency: [facility]       Susceptibility     Klebsiella pneumoniae     NAUN     Ampicillin >=32 ug/mL Resistant 1     Ampicillin/ Sulbactam 4 ug/mL Susceptible     Cefazolin <=4 ug/mL Susceptible 2     Cefepime <=1 ug/mL Susceptible     Cefoxitin <=4 ug/mL Susceptible     Ceftazidime <=1 ug/mL Susceptible     Ceftriaxone <=1 ug/mL Susceptible     Ciprofloxacin <=0.25 ug/mL Susceptible     Gentamicin <=1 ug/mL Susceptible     Levofloxacin <=0.12 ug/mL Susceptible     Nitrofurantoin 64 ug/mL Intermediate     Piperacillin/Tazobactam <=4 ug/mL Susceptible     Tobramycin <=1 ug/mL  Susceptible     Trimethoprim/Sulfamethoxazole <=1/19 ug/mL Susceptible                         Sybil Gregory MD  [facility]  [phone]

## 2022-08-18 VITALS
RESPIRATION RATE: 18 BRPM | BODY MASS INDEX: 31.58 KG/M2 | HEART RATE: 79 BPM | HEIGHT: 67 IN | WEIGHT: 201.2 LBS | TEMPERATURE: 98.6 F | OXYGEN SATURATION: 94 % | SYSTOLIC BLOOD PRESSURE: 147 MMHG | DIASTOLIC BLOOD PRESSURE: 72 MMHG

## 2022-08-18 LAB
ANION GAP SERPL CALCULATED.3IONS-SCNC: 6 MMOL/L (ref 5–18)
BUN SERPL-MCNC: 19 MG/DL (ref 8–28)
CALCIUM SERPL-MCNC: 8.1 MG/DL (ref 8.5–10.5)
CHLORIDE BLD-SCNC: 113 MMOL/L (ref 98–107)
CO2 SERPL-SCNC: 20 MMOL/L (ref 22–31)
CREAT SERPL-MCNC: 1.01 MG/DL (ref 0.6–1.1)
ERYTHROCYTE [DISTWIDTH] IN BLOOD BY AUTOMATED COUNT: 15.8 % (ref 10–15)
GFR SERPL CREATININE-BSD FRML MDRD: 53 ML/MIN/1.73M2
GLUCOSE BLD-MCNC: 89 MG/DL (ref 70–125)
HCT VFR BLD AUTO: 23.9 % (ref 35–47)
HGB BLD-MCNC: 8 G/DL (ref 11.7–15.7)
MAGNESIUM SERPL-MCNC: 1.8 MG/DL (ref 1.8–2.6)
MCH RBC QN AUTO: 29.4 PG (ref 26.5–33)
MCHC RBC AUTO-ENTMCNC: 33.5 G/DL (ref 31.5–36.5)
MCV RBC AUTO: 88 FL (ref 78–100)
PLATELET # BLD AUTO: 133 10E3/UL (ref 150–450)
POTASSIUM BLD-SCNC: 3.8 MMOL/L (ref 3.5–5)
RBC # BLD AUTO: 2.72 10E6/UL (ref 3.8–5.2)
SODIUM SERPL-SCNC: 139 MMOL/L (ref 136–145)
WBC # BLD AUTO: 9.3 10E3/UL (ref 4–11)

## 2022-08-18 PROCEDURE — 36415 COLL VENOUS BLD VENIPUNCTURE: CPT | Performed by: INTERNAL MEDICINE

## 2022-08-18 PROCEDURE — 85027 COMPLETE CBC AUTOMATED: CPT | Performed by: INTERNAL MEDICINE

## 2022-08-18 PROCEDURE — 80048 BASIC METABOLIC PNL TOTAL CA: CPT | Performed by: INTERNAL MEDICINE

## 2022-08-18 PROCEDURE — 250N000013 HC RX MED GY IP 250 OP 250 PS 637: Performed by: INTERNAL MEDICINE

## 2022-08-18 PROCEDURE — 99239 HOSP IP/OBS DSCHRG MGMT >30: CPT | Performed by: INTERNAL MEDICINE

## 2022-08-18 PROCEDURE — 83735 ASSAY OF MAGNESIUM: CPT | Performed by: INTERNAL MEDICINE

## 2022-08-18 RX ORDER — CEFDINIR 300 MG/1
300 CAPSULE ORAL EVERY 12 HOURS
Qty: 15 CAPSULE | Refills: 0 | Status: SHIPPED | OUTPATIENT
Start: 2022-08-18 | End: 2022-08-26

## 2022-08-18 RX ADMIN — CEFDINIR 300 MG: 300 CAPSULE ORAL at 08:14

## 2022-08-18 RX ADMIN — FLUTICASONE FUROATE 1 PUFF: 100 POWDER RESPIRATORY (INHALATION) at 08:16

## 2022-08-18 RX ADMIN — LOSARTAN POTASSIUM 100 MG: 50 TABLET, FILM COATED ORAL at 08:14

## 2022-08-18 RX ADMIN — UMECLIDINIUM 1 PUFF: 62.5 AEROSOL, POWDER ORAL at 08:16

## 2022-08-18 RX ADMIN — Medication: at 08:16

## 2022-08-18 ASSESSMENT — ACTIVITIES OF DAILY LIVING (ADL)
ADLS_ACUITY_SCORE: 39
ADLS_ACUITY_SCORE: 38
ADLS_ACUITY_SCORE: 39
ADLS_ACUITY_SCORE: 38
ADLS_ACUITY_SCORE: 38

## 2022-08-18 NOTE — PROGRESS NOTES
Care Management Discharge Note    Discharge Date: 08/18/2022       Discharge Disposition: Assisted Living    Discharge Services:  Assisted liviing    Discharge DME:  Alex catheter    Discharge Transportation: health plan transportation    Private pay costs discussed: transportation costs - discussed with daughterRani.    Education Provided on the Discharge Plan:  Per Care Team  Persons Notified of Discharge Plans: patient, daughter, assisted living facility  Patient/Family in Agreement with the Plan: yes    Handoff Referral Completed: Yes    Additional Information:  Chart reviewed.  Patient cleared for discharge and return to her assisted living at [facility].  RNCM called and spoke to RN at care facility ([phone]) to inform of patient return.  RN requests that discharge MD hand sign orders (required by facility pharmacy).  Discharge orders faxed to [phone].  Patient to have follow up with Urology, PCP and [facility] Clinic as recommended.    Writer called patient's daughter, Rani, to introduce self and inform of patient discharge plan.  Questions answered to the best of my knowledge per chart review.  Daughter requests medical transport, she is aware of and agreeable with possible transport costs.    [facility] wheelchair transport scheduled for 10:30 AM today.  [facility] notified of transport time and patient returning with alex catheter.      Jaylene Kaiser, HUY

## 2022-08-18 NOTE — PLAN OF CARE
Problem: Plan of Care - These are the overarching goals to be used throughout the patient stay.    Goal: Absence of Hospital-Acquired Illness or Injury  Intervention: Identify and Manage Fall Risk  Recent Flowsheet Documentation  Taken 8/17/2022 1958 by Elbert Acevedo, RN  Safety Promotion/Fall Prevention:   activity supervised   assistive device/personal items within reach   bed alarm on   clutter free environment maintained   fall prevention program maintained   lighting adjusted   mobility aid in reach   nonskid shoes/slippers when out of bed   patient and family education   room organization consistent   safety round/check completed   supervised activity   toileting scheduled  Taken 8/17/2022 1640 by Elbert Acevedo, RN  Safety Promotion/Fall Prevention:   activity supervised   assistive device/personal items within reach   bed alarm on   clutter free environment maintained   fall prevention program maintained   lighting adjusted   mobility aid in reach   nonskid shoes/slippers when out of bed   patient and family education   room organization consistent   safety round/check completed   supervised activity   toileting scheduled     Problem: Plan of Care - These are the overarching goals to be used throughout the patient stay.    Goal: Optimal Comfort and Wellbeing  Outcome: Ongoing, Progressing     Problem: Bleeding (Gastrointestinal Bleeding)  Goal: Hemostasis  Outcome: Ongoing, Progressing   Goal Outcome Evaluation:    Plan of Care Reviewed With: patient     Pt had 1 unit PRBC administered today. Tolerated well. Hgb recheck was 8.4. VSS. No c/o pain. Pt had a small BM this shift that was watery and maroon colored. Pleasant and cooperative.

## 2022-08-18 NOTE — PLAN OF CARE
Physical Therapy Discharge Summary    Reason for therapy discharge:    Discharged to Assisted Living Facility    Progress towards therapy goal(s). See goals on Care Plan in [facility] electronic health record for goal details.  Goals partially met.  Barriers to achieving goals:   discharge from facility.    Therapy recommendation(s):    Continued therapy is recommended.  Rationale/Recommendations:  Continue PT to improve safety and independence with mobility.

## 2022-08-18 NOTE — PLAN OF CARE
Problem: Plan of Care - These are the overarching goals to be used throughout the patient stay.    Goal: Readiness for Transition of Care  Outcome: Met   Goal Outcome Evaluation:    Plan of Care Reviewed With: patient     Overall Patient Progress: improving    Outcome Evaluation: no further blood stools, no abdominal pain or nausea.    Discharging to assisted living, alex care reviewed with patient using teach back.

## 2022-08-18 NOTE — DISCHARGE SUMMARY
[facility]  Hospitalist Discharge Summary      Date of Admission:  8/14/2022  Date of Discharge:  8/18/2022 10:35 AM  Discharging Provider: Sybil Gregory MD  Discharge Service: Hospitalist Service    Discharge Diagnoses   Principal Problem:    Gastrointestinal hemorrhage, unspecified gastrointestinal hemorrhage type  Active Problems:    Essential hypertension    Obstructive lung disease (H)    Atrial fibrillation (H)    MELONY (acute kidney injury) (H)    Left hydrocele    Hydronephrosis of left kidney    Acute cystitis without hematuria    Anemia due to blood loss, acute    Hypomagnesemia      Follow-ups Needed After Discharge   Follow-up Appointments     Follow-up and recommended labs and tests      [facility] will call you to arrange outpatient follow up with GABRIELE Grady PA-C in 2-3 weeks and cystoscopy/left ureteral stent exchange in 4 months   with Dr. Viveros. You may call to confirm appointments as needed.   [facility], [phone]         Follow-up and recommended labs and tests       Follow up with primary care provider, Clementine Watts, within PCP n 3-5   days, to evaluate medication change and for hospital follow- up. The   following labs/tests are recommended: BMP and CBC  Arrange follow-up for Afib clinic next available to discussed   anticoagulation and consider watchman.             Unresulted Labs Ordered in the Past 30 Days of this Admission     Date and Time Order Name Status Description    8/17/2022  1:15 PM Prepare red blood cells (unit) Preliminary     8/14/2022 12:03 PM Blood Culture Peripheral Blood Preliminary     8/14/2022 12:03 PM Blood Culture Arm, Left Preliminary           Discharge Disposition   Discharged to assisted living  Condition at discharge: Stable      Hospital Course   Bessy Sevilla is a 88 year old female with h/o afib on Xarelto, CVA, LANRE, HTN who resides in assisted living facility, when staff came to check on her,  found her in the bed with dark red blood saturating bedding and brought to ED for evaluation, found to have drop in hemoglobin, significant increase in creatinine      Acute GI bleeding in the setting of anticoagulation;   On admission hemoglobin 10.6, dropped to 9.2 after IV fluid bolus.  -CTA abdomen pelvis with no evidence of active GI bleeding  - EGD and colonoscopy no acute bleeding suspect diverticulosis and recommend stopping xarelto   - Hgb still low but no further bleeding   - Discussed with daughter Rani and would like cardiology consult to review anticoagulation   - cardiology seen We can have her see our Atrial Fibrillation clinic as an outpatient to discuss options for thromboembolic prophylaxis including resuming oral anticoagulation and/or consideration of a Watchman left atrial appendage closure device.    Acute blood loss anemia due to GI bleed;  - transfuse for HGB less then 7.    -no further bleeding today and vitals stable        Acute kidney injury on CKD stage III;  Metabolic acidosis; likely due to MELONY and GI bleed,Hyperkalemia resolved   Abnormal CT with gas within the bladder, concern for cystitis and also some abnormal finding on left renal pelvis and left ureter  -- Creatinine improving with IV hydration, continue IV fluid   -- Avoid nephrotoxic medications   -- Monitor labs, intake/output, weight closely  -- Appreciate urology input, Status post left ureteral stent on 8/14,022  - IV zosyn deescalated to IV ceftriaxone and sensitivities return, changed to oral cefdinir tomorrow for 10 days total   - urology placed alex care orders      Hypomagnesia  - getting IV mag and recheck stable      Essential hypertension;  - stable on home BP meds     History of PAF;  -- Heart rate controlled and in sinus  - Xarelto held and per GI recommend stopping  See above      History of CVA with left-sided weakness, wheelchair-bound per previous medical record;  - PT/OT  - patient transfers on her own  and per nurse at baseline      Hx of COPD; not on exacerbation; home inhalers. IS    Consultations This Hospital Stay   GASTROENTEROLOGY IP CONSULT  PHARMACY IP CONSULT  UROLOGY IP CONSULT  CARE MANAGEMENT / SOCIAL WORK IP CONSULT  CARE MANAGEMENT / SOCIAL WORK IP CONSULT  PHARMACY TO DOSE VANCO  PHYSICAL THERAPY ADULT IP CONSULT  OCCUPATIONAL THERAPY ADULT IP CONSULT  CARDIOLOGY IP CONSULT    Code Status   Full Code    Time Spent on this Encounter   I, Sybil Gregory MD, personally saw the patient today and spent greater than 30 minutes discharging this patient.       Sybil Gregory MD  [facility] HE[facility] [address] [address]  Phone: [phone]  Fax: [phone]  ______________________________________________________________________    Physical Exam   Vital Signs: Temp: 98.6  F (37  C) Temp src: Oral BP: (!) 147/72 Pulse: 79   Resp: 18 SpO2: 94 % O2 Device: None (Room air)    Weight: 201 lbs 3.2 oz  Physical Exam  Constitutional:       General: She is not in acute distress.     Appearance: She is obese. She is not ill-appearing.   Cardiovascular:      Rate and Rhythm: Normal rate and regular rhythm.      Pulses: Normal pulses.      Heart sounds: Murmur heard.   Pulmonary:      Effort: Pulmonary effort is normal.      Breath sounds: Normal breath sounds.   Abdominal:      General: Bowel sounds are normal.      Palpations: Abdomen is soft.   Musculoskeletal:         General: Normal range of motion.      Comments: BLE ankle edema    Skin:     General: Skin is warm and dry.      Capillary Refill: Capillary refill takes less than 2 seconds.   Neurological:      Mental Status: She is alert and oriented to person, place, and time. Mental status is at baseline.              Primary Care Physician   Clementine Watts    Discharge Orders      Follow-up and recommended labs and tests    [facility] will call you to arrange outpatient follow up with GABRIELE Grady PA-C in  2-3 weeks and cystoscopy/left ureteral stent exchange in 4 months with Dr. Viveros. You may call to confirm appointments as needed. [facility], [phone]     Reason for your hospital stay    While you were in the hospital, you had a alex catheter placed for infection and maintained at discharge due to history of chronic complete urinary incontinence.     Discharge Instructions    ou are being discharged with a catheter.  The nurse should have reviewed with you how to take care of the catheter while at home, hold on to any written instructions if you received them.   You may where the leg bag during the day, empty the bag when it is 2/3 full to avoid overfilling   You should switch to the bigger bag at night as it holds more urine, when sleeping make sure that this bag is lower then you to ensure that it continues to drain well (example: hang on waist paper basket).    Clean around where the tube enters your body with soap and water, pat dry.     Men: You may apply bacitracin to the tip of the penis up to 3 times per day to help with discomfort     Reason for your hospital stay    Principal Problem:    Gastrointestinal hemorrhage, unspecified gastrointestinal hemorrhage type  Active Problems:    Essential hypertension    Obstructive lung disease (H)    Atrial fibrillation (H)    MELONY (acute kidney injury) (H)    Left hydrocele    Hydronephrosis of left kidney    Acute cystitis without hematuria    Anemia due to blood loss, acute    Hypomagnesemia     Activity    Your activity upon discharge: activity as tolerated, resume previous activity and restrictions     Follow-up and recommended labs and tests     Follow up with primary care provider, Clementine Watts, within PCP n 3-5 days, to evaluate medication change and for hospital follow- up. The following labs/tests are recommended: BMP and CBC  Arrange follow-up for Afib clinic next available to discussed anticoagulation and consider watchman.      Diet    Follow this diet upon discharge: Orders Placed This Encounter      Advance Diet as Tolerated: Regular Diet Adult       Significant Results and Procedures   Most Recent 3 CBC's:Recent Labs   Lab Test 08/18/22  0430 08/17/22 2013 08/17/22  1216 08/16/22  1150 08/16/22  0428 08/15/22  1614 08/15/22  0555   WBC 9.3  --   --   --  10.2  --  17.8*   HGB 8.0* 8.4* 6.7*   < > 6.3*   < > 6.6*   MCV 88  --   --   --  97  --  88   *  --   --   --  96*  --  202    < > = values in this interval not displayed.   ,   Results for orders placed or performed during the hospital encounter of 08/14/22   CTA Abdomen Pelvis with Contrast    Narrative    EXAM: CTA ABDOMEN PELVIS WITH CONTRAST  LOCATION: [facility]  DATE/TIME: 8/14/2022 5:02 AM    INDICATION: Gastrointestinal bleeding  COMPARISON: None.  TECHNIQUE: CT angiogram abdomen pelvis during arterial phase of injection of IV contrast. 2D and 3D MIP reconstructions were performed by the CT technologist. Dose reduction techniques were used.  CONTRAST: 5 mL Isovue 370    FINDINGS:          AORTO-ILIAC: No intramural hematoma. No dissection or aneurysm. No significant focal stenosis or branch vessel occlusion.    There is classic hepatic arterial anatomy.     There are single bilateral renal arteries.    LOWER CHEST: Several small noncalcified pulmonary nodules, largest measuring 5 mm in the right lower lobe, axial image 107. Mild dependent atelectasis.    Moderate cardiomegaly. Atheromatous calcified location of coronary arteries and the visualized descending thoracic aorta.    HEPATOBILIARY: Liver enhances normally and is normal in size.    Cholecystectomy.    No intrahepatic or intrahepatic biliary ductal dilatation.        PANCREAS: Enhances normally. No peripancreatic inflammatory fat stranding.    SPLEEN: Enhances normally. Normal size.    ADRENAL GLANDS: Normal.    KIDNEYS: Progressive left renal cortical atrophy and chronic left  renal pelvis and left ureteral dilatation with diffuse urothelial thickening and asymmetric left renal enhancement. No discrete obstructing stone or mass lesion is not identified, though   assessment within the pelvis is limited due to extensive beam hardening and streak artifact from bilateral hip arthroplasties. This appearance is similar to 2016 and may reflect sequela of previous obstruction.    Multifocal bilateral renal cortical scarring. There is a small nonobstructing right renal stone.        There is gas within the urinary bladder; the absence of any recent instrumentation, infection cannot be excluded. Correlation with urinalysis recommended.    PELVIC ORGANS: Uterus is surgically absent.    BOWEL: No evidence of acute gastrointestinal inflammation or obstruction.     Colonic diverticulosis. No evidence of active gastrointestinal bleeding. Normal appendix.    No intraperitoneal free fluid or free air.    LYMPH NODES: No suspicious abdominal or pelvic lymphadenopathy.    VASCULATURE: See arterial findings, as detailed above. Mild to moderate atheromatous disease.    MUSCULOSKELETAL: Bilateral sacroiliac joint erosions, which are age indeterminate. Instrumented posterior spinal fusion from L3 to L5.    OTHER: No additionally significant abnormalities.      Impression    IMPRESSION:   1.  No CT evidence of acute gastrointestinal bleeding.  2.  Gas within the urinary bladder. In the absence of any recent instrumentation, this may reflect underlying cystitis. Correlation urinalysis recommended.  3.  Capacious appearance of the left renal pelvis and left ureter, similar to prior and likely a chronic finding. Discrete obstructing stone or mass lesion is not identified. Of note, the distal left ureter is difficult to assess, due to extensive beam   hardening and streak artifact from bilateral hip arthroplasties.  4.  Age-indeterminate, bilateral sacroiliitis.  5.  Diverticulosis.  6.  Small, nonobstructing right  renal stone.  7.  Small noncalcified pulmonary nodules, measuring no greater than 5 mm. Follow-up is recommended according to Fleischner criteria, as detailed below.    Fleischner Society Recommendations for Pulmonary Nodules    Nodule size less than 6 mm:     Single or multiple nodules:     Nodules < 6 mm do not require routine follow-up, but certain patients at high risk with suspicious nodule morphology, upper lobe location, or both may warrant 12-month follow-up.   XR Surgery LAURA L/T 5 Min Fluoro    Narrative    This exam was marked as non-reportable because it will not be read by a   radiologist or a [place] non-radiologist provider.         CTA Abdomen Pelvis with Contrast    Narrative    EXAM: CTA ABDOMEN PELVIS WITH CONTRAST  LOCATION: [facility]  DATE/TIME: 8/15/2022 5:07 PM    INDICATION: Red rectal bleeding today anemia  COMPARISON: 8/14/2022  TECHNIQUE: CT angiogram abdomen pelvis during arterial phase of injection of IV contrast. 2D and 3D MIP reconstructions were performed by the CT technologist. Dose reduction techniques were used.  CONTRAST: isovue 370 75ml    FINDINGS:  ANGIOGRAM ABDOMEN/PELVIS: No evidence of active extravasation of contrast. Tortuous atherosclerotic aorta. No dissection or aneurysm. Patent celiac, superior mesenteric, renal, and inferior mesenteric arteries. Patent portal and hepatic veins.    LOWER CHEST: Mildly enlarged heart with coronary artery disease.    HEPATOBILIARY: Status post cholecystectomy.    PANCREAS: Normal.    SPLEEN: Normal.    ADRENAL GLANDS: Normal.    KIDNEYS/BLADDER: Left-sided ureteral stent. Rajput catheter with inflated balloon within bladder. 10.2 x 6.3 cm simple left renal cyst, no follow-up required. Bilateral renal cortical scarring. No obstructing renal or ureteral stones.    BOWEL: Diverticulosis. No diverticulitis, colitis, or obstruction. No free air or fluid.    LYMPH NODES: Normal.    PELVIC ORGANS: Absent  uterus.    MUSCULOSKELETAL: L3-L5 posterior fusion. T10 and L1 unchanged compression deformities. Bilateral total hip arthroplasties. Bilateral sacroiliitis.      Impression    IMPRESSION:  1.  No CT evidence of active GI bleed. If clinical concern persists, consider evaluation with tagged red blood cell study.  2.  Atherosclerotic aorta. No obstruction, colitis, or diverticulitis.  3.  Left ureteral stent and Rajput catheter in situ. No hydroureteronephrosis.  4.  Diverticulosis. No diverticulitis, colitis, or obstruction.  5.  Status post hysterectomy and cholecystectomy.   Echocardiogram Complete     Value    LVEF  55-60%    Narrative    [id number]  [id number]  [id number]  [id number]^PROMISE^FILIBERTO^[place] [address]     Name: MOISES LADD  MRN: [id number]  : 1934  Study Date: 08/15/2022 01:33 PM  Age: 88 yrs  Gender: Female  Patient Location: [facility]  Reason For Study: Abn EKG  Ordering Physician: FILIBERTO VIRGEN  Performed By:      BSA: 2.0 m2  Height: 67 in  Weight: 202 lb  HR: 94  ______________________________________________________________________________  Procedure  Complete Portable Echo Adult. Definity (NDC #28632-804) given intravenously.  ______________________________________________________________________________  Interpretation Summary     Left ventricular size, wall motion and function are normal. The ejection  fraction is 55-60%.  The right ventricle is moderately dilated.  Mildly decreased right ventricular systolic function  The left atrium is mild to moderately dilated.  No hemodynamically significant valvular abnormalities on 2D or color flow  imaging. The study was technically difficult.  ______________________________________________________________________________  Left Ventricle  Left ventricular size, wall motion and function are normal. The ejection  fraction is 55-60%. There is normal left ventricular wall thickness.  Left  ventricular diastolic function is abnormal. No regional wall motion  abnormalities noted.     Right Ventricle  The right ventricle is moderately dilated. Mildly decreased right ventricular  systolic function.     Atria  The left atrium is mild to moderately dilated. Right atrium not well  visualized. There is no color Doppler evidence of an atrial shunt.     Mitral Valve  Mitral valve leaflets appear normal. There is no evidence of mitral stenosis  or clinically significant mitral regurgitation.     Tricuspid Valve  Tricuspid valve leaflets appear normal. Right ventricular systolic pressure  could not be approximated due to inadequate tricuspid regurgitation.     Aortic Valve  There is mild trileaflet aortic sclerosis. There is mild (1+) aortic  regurgitation.     Pulmonic Valve  The pulmonic valve is not well seen, but is grossly normal. This degree of  valvular regurgitation is within normal limits.     Vessels  The aorta root is normal. Normal size ascending aorta. IVC diameter <2.1 cm  collapsing >50% with sniff suggests a normal RA pressure of 3 mmHg.     Pericardium  There is no pericardial effusion.     ______________________________________________________________________________  MMode/2D Measurements & Calculations  RVDd: 4.6 cm  IVSd: 1.6 cm  LVIDd: 4.2 cm  LVIDs: 2.8 cm  LVPWd: 1.2 cm     FS: 33.3 %  LV mass(C)d: 223.5 grams  LV mass(C)dI: 110.1 grams/m2  Ao root diam: 3.9 cm  LA dimension: 5.3 cm  asc Aorta Diam: 3.8 cm  LA/Ao: 1.4  LVOT diam: 2.3 cm  LVOT area: 4.1 cm2  RWT: 0.59     Doppler Measurements & Calculations  MV E max yessica: 56.3 cm/sec  MV A max yessica: 101.6 cm/sec  MV E/A: 0.55  MV dec slope: 201.7 cm/sec2  MV dec time: 0.29 sec  Ao V2 max: 161.5 cm/sec  Ao max PG: 10.0 mmHg  Ao V2 mean: 113.7 cm/sec  Ao mean P.0 mmHg  Ao V2 VTI: 28.0 cm  SEE(I,D): 2.1 cm2  SEE(V,D): 2.2 cm2  AI P1/2t: 503.7 msec  LV V1 max P.9 mmHg  LV V1 max: 85.1 cm/sec  LV V1 VTI: 14.0 cm  SV(LVOT): 57.6  ml  SI(LVOT): 28.3 ml/m2  AV Evans Ratio (DI): 0.53  SEE Index (cm2/m2): 1.0     E/E' av.0  Lateral E/e': 10.1  Medial E/e': 17.8     ______________________________________________________________________________  Report approved by: Marquise Bennett 08/15/2022 02:31 PM               Discharge Medications   Current Discharge Medication List      START taking these medications    Details   cefdinir (OMNICEF) 300 MG capsule Take 1 capsule (300 mg) by mouth every 12 hours for 15 doses  Qty: 15 capsule, Refills: 0    Associated Diagnoses: Acute cystitis without hematuria         CONTINUE these medications which have NOT CHANGED    Details   acetaminophen (TYLENOL) 500 MG tablet Take 1,000 mg by mouth 3 times daily as needed for mild pain      albuterol (PROVENTIL HFA;VENTOLIN HFA) 90 mcg/actuation inhaler Inhale 2 puffs into the lungs every 4 hours as needed      ascorbic acid, vitamin C, (VITAMIN C) 500 MG tablet Take 1,000 mg by mouth daily      atorvastatin (LIPITOR) 20 MG tablet Take 20 mg by mouth At Bedtime      beclomethasone HFA (QVAR REDIHALER) 80 MCG/ACT inhaler Inhale 2 puffs into the lungs 2 times daily      benzonatate (TESSALON) 100 MG capsule Take 100 mg by mouth 3 times daily as needed for cough      cetirizine (ZYRTEC) 5 MG tablet Take 5 mg by mouth daily      clotrimazole (LOTRIMIN) 1 % external cream Apply topically 2 times daily To rash on left arm until healed      cyanocobalamin 500 MCG tablet Take 1 tablet by mouth daily      DULoxetine (CYMBALTA) 60 MG capsule [DULOXETINE (CYMBALTA) 60 MG CAPSULE] Take 60 mg by mouth daily.      emollient (VANICREAM) external cream Apply topically daily To right lower extremity      estradiol (ESTRACE) 0.1 MG/GM vaginal cream Place 1 g vaginally three times a week      hydrocortisone (CORTAID) 1 % external cream Apply topically 2 times daily as needed Apply to rash on arm as needed      ipratropium-albuterol (COMBIVENT RESPIMAT)  MCG/ACT inhaler  Inhale 1 puff into the lungs 4 times daily as needed for shortness of breath / dyspnea or wheezing      loperamide (IMODIUM) 2 MG capsule Take 2 mg by mouth 3 times daily as needed for diarrhea      losartan (COZAAR) 100 MG tablet Take 100 mg by mouth daily      Menthol, Topical Analgesic, (BIOFREEZE) 4 % GEL Externally apply 1 Application topically 2 times daily To the right side of the neck      nitroGLYcerin (NITROSTAT) 0.4 MG sublingual tablet Place 0.4 mg under the tongue every 5 minutes as needed for chest pain For chest pain place 1 tablet under the tongue every 5 minutes for 3 doses. If symptoms persist 5 minutes after 1st dose call 911.      nystatin (MYCOSTATIN) 794605 UNIT/GM external powder Apply topically daily Apply powder to reddened area on abdominal folds and bilateral breasts until redness has gone      pyridOXINE (VITAMIN B6) 100 MG TABS Take 200 mg by mouth daily      !! senna-docusate (SENOKOT-S/PERICOLACE) 8.6-50 MG tablet Take 1 tablet by mouth At Bedtime      !! senna-docusate (SENOKOT-S/PERICOLACE) 8.6-50 MG tablet Take 1 tablet by mouth 2 times daily as needed for constipation      tiotropium (SPIRIVA RESPIMAT) 2.5 MCG/ACT inhaler Inhale 2 puffs into the lungs daily      traZODone (DESYREL) 50 MG tablet Take 75 mg by mouth At Bedtime      vitamin D3 (CHOLECALCIFEROL) 50 mcg (2000 units) tablet Take 2 tablets by mouth daily       !! - Potential duplicate medications found. Please discuss with provider.      STOP taking these medications       rivaroxaban ANTICOAGULANT (XARELTO) 15 MG TABS tablet Comments:   Reason for Stopping:             Allergies   Allergies   Allergen Reactions     Sulfa (Sulfonamide Antibiotics) [Sulfa Drugs] Swelling

## 2022-08-18 NOTE — PLAN OF CARE
Assumed care 2300 to 0730. A&O x 3, disoriented to time. Assist x 2 with walker and gait belt. Tele removed. Denies pain. Rajput catheter in place, bag below bladder. Rajput cares done this shift.  Call light within reach, able to make needs known. Bed alarm on for safety.    Problem: Bleeding (Gastrointestinal Bleeding)  Goal: Hemostasis  Outcome: Ongoing, Progressing     Problem: Adjustment to Illness (Gastrointestinal Bleeding)  Goal: Optimal Coping with Acute Illness  Outcome: Ongoing, Progressing

## 2022-08-18 NOTE — PLAN OF CARE
Occupational Therapy Discharge Summary    Reason for therapy discharge:    Discharged home.    Progress towards therapy goal(s). [facility] for goal details.  Progressing towards goals/not fully met.    Therapy recommendation(s):    Pt could benefit from home OT to maximize participation w/ADLs & return to prior level of functioning.

## 2022-08-19 LAB
BACTERIA BLD CULT: NO GROWTH
BACTERIA BLD CULT: NO GROWTH

## 2022-08-22 ENCOUNTER — LAB REQUISITION (OUTPATIENT)
Dept: LAB | Facility: CLINIC | Age: 87
End: 2022-08-22
Payer: COMMERCIAL

## 2022-08-22 DIAGNOSIS — Z72.4 INAPPROPRIATE DIET AND EATING HABITS: ICD-10-CM

## 2022-08-22 DIAGNOSIS — K59.1 FUNCTIONAL DIARRHEA: ICD-10-CM

## 2022-08-24 LAB
ALBUMIN SERPL BCG-MCNC: 2.9 G/DL (ref 3.5–5.2)
ALP SERPL-CCNC: 110 U/L (ref 35–104)
ALT SERPL W P-5'-P-CCNC: 26 U/L (ref 10–35)
ANION GAP SERPL CALCULATED.3IONS-SCNC: 13 MMOL/L (ref 7–15)
AST SERPL W P-5'-P-CCNC: 35 U/L (ref 10–35)
BASOPHILS # BLD AUTO: 0 10E3/UL (ref 0–0.2)
BASOPHILS NFR BLD AUTO: 0 %
BILIRUB SERPL-MCNC: 0.2 MG/DL
BUN SERPL-MCNC: 18.8 MG/DL (ref 8–23)
CALCIUM SERPL-MCNC: 8.9 MG/DL (ref 8.8–10.2)
CHLORIDE SERPL-SCNC: 106 MMOL/L (ref 98–107)
CREAT SERPL-MCNC: 1.38 MG/DL (ref 0.51–0.95)
DEPRECATED HCO3 PLAS-SCNC: 19 MMOL/L (ref 22–29)
EOSINOPHIL # BLD AUTO: 0.4 10E3/UL (ref 0–0.7)
EOSINOPHIL NFR BLD AUTO: 3 %
ERYTHROCYTE [DISTWIDTH] IN BLOOD BY AUTOMATED COUNT: 16.2 % (ref 10–15)
GFR SERPL CREATININE-BSD FRML MDRD: 37 ML/MIN/1.73M2
GLUCOSE SERPL-MCNC: 45 MG/DL (ref 70–99)
HCT VFR BLD AUTO: 27.8 % (ref 35–47)
HGB BLD-MCNC: 8.8 G/DL (ref 11.7–15.7)
IMM GRANULOCYTES # BLD: 0.1 10E3/UL
IMM GRANULOCYTES NFR BLD: 1 %
LYMPHOCYTES # BLD AUTO: 0.9 10E3/UL (ref 0.8–5.3)
LYMPHOCYTES NFR BLD AUTO: 6 %
MCH RBC QN AUTO: 30.2 PG (ref 26.5–33)
MCHC RBC AUTO-ENTMCNC: 31.7 G/DL (ref 31.5–36.5)
MCV RBC AUTO: 96 FL (ref 78–100)
MONOCYTES # BLD AUTO: 0.8 10E3/UL (ref 0–1.3)
MONOCYTES NFR BLD AUTO: 6 %
NEUTROPHILS # BLD AUTO: 12 10E3/UL (ref 1.6–8.3)
NEUTROPHILS NFR BLD AUTO: 84 %
NRBC # BLD AUTO: 0 10E3/UL
NRBC BLD AUTO-RTO: 0 /100
PLATELET # BLD AUTO: 302 10E3/UL (ref 150–450)
POTASSIUM SERPL-SCNC: 4.3 MMOL/L (ref 3.4–5.3)
PROT SERPL-MCNC: 6 G/DL (ref 6.4–8.3)
RBC # BLD AUTO: 2.91 10E6/UL (ref 3.8–5.2)
SODIUM SERPL-SCNC: 138 MMOL/L (ref 136–145)
WBC # BLD AUTO: 14.2 10E3/UL (ref 4–11)

## 2022-08-24 PROCEDURE — 85025 COMPLETE CBC W/AUTO DIFF WBC: CPT | Mod: ORL | Performed by: NURSE PRACTITIONER

## 2022-08-24 PROCEDURE — 36415 COLL VENOUS BLD VENIPUNCTURE: CPT | Mod: ORL | Performed by: NURSE PRACTITIONER

## 2022-08-24 PROCEDURE — 80053 COMPREHEN METABOLIC PANEL: CPT | Mod: ORL | Performed by: NURSE PRACTITIONER

## 2022-08-24 PROCEDURE — P9603 ONE-WAY ALLOW PRORATED MILES: HCPCS | Mod: ORL | Performed by: NURSE PRACTITIONER

## 2022-08-27 ENCOUNTER — LAB REQUISITION (OUTPATIENT)
Dept: LAB | Facility: CLINIC | Age: 87
End: 2022-08-27
Payer: COMMERCIAL

## 2022-08-27 DIAGNOSIS — D72.828 OTHER ELEVATED WHITE BLOOD CELL COUNT: ICD-10-CM

## 2022-09-01 ENCOUNTER — LAB REQUISITION (OUTPATIENT)
Dept: LAB | Facility: CLINIC | Age: 87
End: 2022-09-01
Payer: COMMERCIAL

## 2022-09-01 DIAGNOSIS — K59.1 FUNCTIONAL DIARRHEA: ICD-10-CM

## 2022-09-01 DIAGNOSIS — Z72.4 INAPPROPRIATE DIET AND EATING HABITS: ICD-10-CM

## 2022-09-01 LAB — HEMOCCULT STL QL IA: NEGATIVE

## 2022-09-01 PROCEDURE — 82274 ASSAY TEST FOR BLOOD FECAL: CPT | Mod: ORL | Performed by: NURSE PRACTITIONER

## 2022-09-08 ENCOUNTER — TELEPHONE (OUTPATIENT)
Dept: PHARMACY | Facility: CLINIC | Age: 87
End: 2022-09-08

## 2022-09-08 NOTE — TELEPHONE ENCOUNTER
Patient was referred by her  insurance plan.  Called patient to schedule appointment. Left voicemail with [facility] scheduling line for patient to call.    Oneil Leon, JaninaD, [facility]  Medication Therapy Management Pharmacist  Pager: [phone]

## 2022-09-19 ENCOUNTER — LAB REQUISITION (OUTPATIENT)
Dept: LAB | Facility: CLINIC | Age: 87
End: 2022-09-19
Payer: COMMERCIAL

## 2022-09-19 DIAGNOSIS — I50.22 CHRONIC SYSTOLIC (CONGESTIVE) HEART FAILURE (H): ICD-10-CM

## 2022-09-21 LAB
ANION GAP SERPL CALCULATED.3IONS-SCNC: 11 MMOL/L (ref 7–15)
BASOPHILS # BLD AUTO: 0 10E3/UL (ref 0–0.2)
BASOPHILS NFR BLD AUTO: 0 %
BUN SERPL-MCNC: 23.6 MG/DL (ref 8–23)
CALCIUM SERPL-MCNC: 9.2 MG/DL (ref 8.8–10.2)
CHLORIDE SERPL-SCNC: 95 MMOL/L (ref 98–107)
CREAT SERPL-MCNC: 1.28 MG/DL (ref 0.51–0.95)
DEPRECATED HCO3 PLAS-SCNC: 26 MMOL/L (ref 22–29)
EOSINOPHIL # BLD AUTO: 0.4 10E3/UL (ref 0–0.7)
EOSINOPHIL NFR BLD AUTO: 6 %
ERYTHROCYTE [DISTWIDTH] IN BLOOD BY AUTOMATED COUNT: 15.2 % (ref 10–15)
GFR SERPL CREATININE-BSD FRML MDRD: 40 ML/MIN/1.73M2
GLUCOSE SERPL-MCNC: 92 MG/DL (ref 70–99)
HCT VFR BLD AUTO: 27.9 % (ref 35–47)
HGB BLD-MCNC: 9 G/DL (ref 11.7–15.7)
IMM GRANULOCYTES # BLD: 0 10E3/UL
IMM GRANULOCYTES NFR BLD: 1 %
LYMPHOCYTES # BLD AUTO: 0.8 10E3/UL (ref 0.8–5.3)
LYMPHOCYTES NFR BLD AUTO: 14 %
MCH RBC QN AUTO: 27.5 PG (ref 26.5–33)
MCHC RBC AUTO-ENTMCNC: 32.3 G/DL (ref 31.5–36.5)
MCV RBC AUTO: 85 FL (ref 78–100)
MONOCYTES # BLD AUTO: 0.7 10E3/UL (ref 0–1.3)
MONOCYTES NFR BLD AUTO: 12 %
NEUTROPHILS # BLD AUTO: 3.9 10E3/UL (ref 1.6–8.3)
NEUTROPHILS NFR BLD AUTO: 67 %
NRBC # BLD AUTO: 0 10E3/UL
NRBC BLD AUTO-RTO: 0 /100
PLATELET # BLD AUTO: 255 10E3/UL (ref 150–450)
POTASSIUM SERPL-SCNC: 3.9 MMOL/L (ref 3.4–5.3)
RBC # BLD AUTO: 3.27 10E6/UL (ref 3.8–5.2)
SODIUM SERPL-SCNC: 132 MMOL/L (ref 136–145)
WBC # BLD AUTO: 5.8 10E3/UL (ref 4–11)

## 2022-09-21 PROCEDURE — 36415 COLL VENOUS BLD VENIPUNCTURE: CPT | Mod: ORL | Performed by: NURSE PRACTITIONER

## 2022-09-21 PROCEDURE — 80048 BASIC METABOLIC PNL TOTAL CA: CPT | Mod: ORL | Performed by: NURSE PRACTITIONER

## 2022-09-21 PROCEDURE — P9604 ONE-WAY ALLOW PRORATED TRIP: HCPCS | Mod: ORL | Performed by: NURSE PRACTITIONER

## 2022-09-21 PROCEDURE — 85025 COMPLETE CBC W/AUTO DIFF WBC: CPT | Mod: ORL | Performed by: NURSE PRACTITIONER

## 2022-11-29 ENCOUNTER — LAB REQUISITION (OUTPATIENT)
Dept: LAB | Facility: CLINIC | Age: 87
End: 2022-11-29
Payer: COMMERCIAL

## 2022-11-29 DIAGNOSIS — D64.89 OTHER SPECIFIED ANEMIAS: ICD-10-CM

## 2022-11-30 LAB — HGB BLD-MCNC: 11.3 G/DL (ref 11.7–15.7)

## 2022-11-30 PROCEDURE — P9604 ONE-WAY ALLOW PRORATED TRIP: HCPCS | Mod: ORL | Performed by: NURSE PRACTITIONER

## 2022-11-30 PROCEDURE — 85018 HEMOGLOBIN: CPT | Mod: ORL | Performed by: NURSE PRACTITIONER

## 2022-11-30 PROCEDURE — 36415 COLL VENOUS BLD VENIPUNCTURE: CPT | Mod: ORL | Performed by: NURSE PRACTITIONER

## 2022-12-13 ENCOUNTER — LAB REQUISITION (OUTPATIENT)
Dept: LAB | Facility: HOSPITAL | Age: 87
End: 2022-12-13
Payer: COMMERCIAL

## 2022-12-13 DIAGNOSIS — Z46.6 ENCOUNTER FOR FITTING AND ADJUSTMENT OF URINARY DEVICE: ICD-10-CM

## 2022-12-13 LAB
ALBUMIN UR-MCNC: 30 MG/DL
APPEARANCE UR: ABNORMAL
BACTERIA #/AREA URNS HPF: ABNORMAL /HPF
BILIRUB UR QL STRIP: NEGATIVE
COLOR UR AUTO: YELLOW
GLUCOSE UR STRIP-MCNC: NEGATIVE MG/DL
HGB UR QL STRIP: ABNORMAL
KETONES UR STRIP-MCNC: NEGATIVE MG/DL
LEUKOCYTE ESTERASE UR QL STRIP: ABNORMAL
NITRATE UR QL: NEGATIVE
PH UR STRIP: 7 [PH] (ref 5–7)
RBC URINE: 19 /HPF
SP GR UR STRIP: 1.01 (ref 1–1.03)
SQUAMOUS EPITHELIAL: 4 /HPF
UROBILINOGEN UR STRIP-MCNC: <2 MG/DL
WBC CLUMPS #/AREA URNS HPF: PRESENT /HPF
WBC URINE: >182 /HPF

## 2022-12-13 PROCEDURE — 81001 URINALYSIS AUTO W/SCOPE: CPT | Mod: ORL | Performed by: NURSE PRACTITIONER

## 2023-01-01 ENCOUNTER — HOSPITAL ENCOUNTER (EMERGENCY)
Facility: HOSPITAL | Age: 88
Discharge: HOME | End: 2023-10-11
Attending: EMERGENCY MEDICINE | Admitting: EMERGENCY MEDICINE
Payer: COMMERCIAL

## 2023-01-01 ENCOUNTER — LAB (OUTPATIENT)
Dept: LAB | Facility: CLINIC | Age: 88
End: 2023-01-01
Payer: COMMERCIAL

## 2023-01-01 ENCOUNTER — APPOINTMENT (OUTPATIENT)
Dept: RADIOLOGY | Facility: HOSPITAL | Age: 88
End: 2023-01-01
Attending: EMERGENCY MEDICINE
Payer: COMMERCIAL

## 2023-01-01 ENCOUNTER — LAB REQUISITION (OUTPATIENT)
Dept: LAB | Facility: CLINIC | Age: 88
End: 2023-01-01
Payer: COMMERCIAL

## 2023-01-01 ENCOUNTER — APPOINTMENT (OUTPATIENT)
Dept: CT IMAGING | Facility: HOSPITAL | Age: 88
End: 2023-01-01
Attending: EMERGENCY MEDICINE
Payer: COMMERCIAL

## 2023-01-01 VITALS
DIASTOLIC BLOOD PRESSURE: 58 MMHG | OXYGEN SATURATION: 95 % | HEART RATE: 82 BPM | WEIGHT: 215 LBS | HEIGHT: 60 IN | BODY MASS INDEX: 42.21 KG/M2 | RESPIRATION RATE: 23 BRPM | TEMPERATURE: 97.5 F | SYSTOLIC BLOOD PRESSURE: 134 MMHG

## 2023-01-01 DIAGNOSIS — I10 ESSENTIAL (PRIMARY) HYPERTENSION: ICD-10-CM

## 2023-01-01 DIAGNOSIS — N13.39 OTHER HYDRONEPHROSIS: Primary | ICD-10-CM

## 2023-01-01 DIAGNOSIS — D64.89 OTHER SPECIFIED ANEMIAS: ICD-10-CM

## 2023-01-01 DIAGNOSIS — K57.31 DIVERTICULOSIS OF LARGE INTESTINE WITH HEMORRHAGE: ICD-10-CM

## 2023-01-01 DIAGNOSIS — I50.22 CHRONIC SYSTOLIC (CONGESTIVE) HEART FAILURE (H): ICD-10-CM

## 2023-01-01 DIAGNOSIS — J44.9 CHRONIC OBSTRUCTIVE PULMONARY DISEASE, UNSPECIFIED (H): ICD-10-CM

## 2023-01-01 DIAGNOSIS — J44.9 CHRONIC OBSTRUCTIVE PULMONARY DISEASE, UNSPECIFIED COPD TYPE (H): ICD-10-CM

## 2023-01-01 LAB
ABO/RH(D): NORMAL
ALBUMIN UR-MCNC: NEGATIVE MG/DL
ANION GAP SERPL CALCULATED.3IONS-SCNC: 10 MMOL/L (ref 7–15)
ANION GAP SERPL CALCULATED.3IONS-SCNC: 12 MMOL/L (ref 7–15)
ANION GAP SERPL CALCULATED.3IONS-SCNC: 17 MMOL/L (ref 7–15)
ANTIBODY SCREEN: NEGATIVE
APPEARANCE UR: ABNORMAL
BACTERIA #/AREA URNS HPF: ABNORMAL /HPF
BACTERIA UR CULT: NORMAL
BASO+EOS+MONOS # BLD AUTO: ABNORMAL 10*3/UL
BASO+EOS+MONOS # BLD AUTO: ABNORMAL 10*3/UL
BASO+EOS+MONOS NFR BLD AUTO: ABNORMAL %
BASO+EOS+MONOS NFR BLD AUTO: ABNORMAL %
BASOPHILS # BLD AUTO: 0 10E3/UL (ref 0–0.2)
BASOPHILS # BLD AUTO: 0.1 10E3/UL (ref 0–0.2)
BASOPHILS NFR BLD AUTO: 0 %
BASOPHILS NFR BLD AUTO: 0 %
BILIRUB UR QL STRIP: NEGATIVE
BUN SERPL-MCNC: 27.7 MG/DL (ref 8–23)
BUN SERPL-MCNC: 29.6 MG/DL (ref 8–23)
BUN SERPL-MCNC: 35.4 MG/DL (ref 8–23)
CALCIUM SERPL-MCNC: 9 MG/DL (ref 8.8–10.2)
CALCIUM SERPL-MCNC: 9.1 MG/DL (ref 8.8–10.2)
CALCIUM SERPL-MCNC: 9.6 MG/DL (ref 8.8–10.2)
CHLORIDE SERPL-SCNC: 100 MMOL/L (ref 98–107)
CHLORIDE SERPL-SCNC: 94 MMOL/L (ref 98–107)
CHLORIDE SERPL-SCNC: 94 MMOL/L (ref 98–107)
COLOR UR AUTO: YELLOW
CREAT SERPL-MCNC: 1.35 MG/DL (ref 0.51–0.95)
CREAT SERPL-MCNC: 1.56 MG/DL (ref 0.51–0.95)
CREAT SERPL-MCNC: 1.6 MG/DL (ref 0.51–0.95)
DEPRECATED HCO3 PLAS-SCNC: 22 MMOL/L (ref 22–29)
DEPRECATED HCO3 PLAS-SCNC: 26 MMOL/L (ref 22–29)
DEPRECATED HCO3 PLAS-SCNC: 30 MMOL/L (ref 22–29)
EGFRCR SERPLBLD CKD-EPI 2021: 30 ML/MIN/1.73M2
EGFRCR SERPLBLD CKD-EPI 2021: 31 ML/MIN/1.73M2
EGFRCR SERPLBLD CKD-EPI 2021: 37 ML/MIN/1.73M2
EOSINOPHIL # BLD AUTO: 0.2 10E3/UL (ref 0–0.7)
EOSINOPHIL # BLD AUTO: 0.2 10E3/UL (ref 0–0.7)
EOSINOPHIL NFR BLD AUTO: 2 %
EOSINOPHIL NFR BLD AUTO: 2 %
ERYTHROCYTE [DISTWIDTH] IN BLOOD BY AUTOMATED COUNT: 15 % (ref 10–15)
ERYTHROCYTE [DISTWIDTH] IN BLOOD BY AUTOMATED COUNT: 15.3 % (ref 10–15)
ERYTHROCYTE [DISTWIDTH] IN BLOOD BY AUTOMATED COUNT: 15.8 % (ref 10–15)
GLUCOSE SERPL-MCNC: 102 MG/DL (ref 70–99)
GLUCOSE SERPL-MCNC: 92 MG/DL (ref 70–99)
GLUCOSE SERPL-MCNC: 96 MG/DL (ref 70–99)
GLUCOSE UR STRIP-MCNC: NEGATIVE MG/DL
HCT VFR BLD AUTO: 34.3 % (ref 35–47)
HCT VFR BLD AUTO: 36.5 % (ref 35–47)
HCT VFR BLD AUTO: 37.5 % (ref 35–47)
HGB BLD-MCNC: 10.4 G/DL (ref 11.7–15.7)
HGB BLD-MCNC: 10.8 G/DL (ref 11.7–15.7)
HGB BLD-MCNC: 11.3 G/DL (ref 11.7–15.7)
HGB BLD-MCNC: 11.9 G/DL (ref 11.7–15.7)
HGB UR QL STRIP: ABNORMAL
IMM GRANULOCYTES # BLD: 0.1 10E3/UL
IMM GRANULOCYTES # BLD: 0.3 10E3/UL
IMM GRANULOCYTES NFR BLD: 1 %
IMM GRANULOCYTES NFR BLD: 2 %
INR PPP: 0.97 (ref 0.85–1.15)
KETONES UR STRIP-MCNC: NEGATIVE MG/DL
LACTATE SERPL-SCNC: 1.9 MMOL/L (ref 0.7–2)
LEUKOCYTE ESTERASE UR QL STRIP: ABNORMAL
LYMPHOCYTES # BLD AUTO: 1 10E3/UL (ref 0.8–5.3)
LYMPHOCYTES # BLD AUTO: 1.1 10E3/UL (ref 0.8–5.3)
LYMPHOCYTES NFR BLD AUTO: 11 %
LYMPHOCYTES NFR BLD AUTO: 9 %
MAGNESIUM SERPL-MCNC: 1.9 MG/DL (ref 1.7–2.3)
MCH RBC QN AUTO: 27 PG (ref 26.5–33)
MCH RBC QN AUTO: 27.2 PG (ref 26.5–33)
MCH RBC QN AUTO: 27.9 PG (ref 26.5–33)
MCHC RBC AUTO-ENTMCNC: 31 G/DL (ref 31.5–36.5)
MCHC RBC AUTO-ENTMCNC: 31.5 G/DL (ref 31.5–36.5)
MCHC RBC AUTO-ENTMCNC: 31.7 G/DL (ref 31.5–36.5)
MCV RBC AUTO: 85 FL (ref 78–100)
MCV RBC AUTO: 88 FL (ref 78–100)
MCV RBC AUTO: 89 FL (ref 78–100)
MONOCYTES # BLD AUTO: 0.8 10E3/UL (ref 0–1.3)
MONOCYTES # BLD AUTO: 1.3 10E3/UL (ref 0–1.3)
MONOCYTES NFR BLD AUTO: 11 %
MONOCYTES NFR BLD AUTO: 8 %
NEUTROPHILS # BLD AUTO: 7.8 10E3/UL (ref 1.6–8.3)
NEUTROPHILS # BLD AUTO: 9.2 10E3/UL (ref 1.6–8.3)
NEUTROPHILS NFR BLD AUTO: 76 %
NEUTROPHILS NFR BLD AUTO: 78 %
NITRATE UR QL: NEGATIVE
NRBC # BLD AUTO: 0 10E3/UL
NRBC # BLD AUTO: 0 10E3/UL
NRBC BLD AUTO-RTO: 0 /100
NRBC BLD AUTO-RTO: 0 /100
PH UR STRIP: 6.5 [PH] (ref 5–8)
PLATELET # BLD AUTO: 203 10E3/UL (ref 150–450)
PLATELET # BLD AUTO: 235 10E3/UL (ref 150–450)
PLATELET # BLD AUTO: 297 10E3/UL (ref 150–450)
POTASSIUM SERPL-SCNC: 4.2 MMOL/L (ref 3.4–5.3)
POTASSIUM SERPL-SCNC: 4.4 MMOL/L (ref 3.4–5.3)
POTASSIUM SERPL-SCNC: 4.6 MMOL/L (ref 3.4–5.3)
RBC # BLD AUTO: 3.87 10E6/UL (ref 3.8–5.2)
RBC # BLD AUTO: 4.15 10E6/UL (ref 3.8–5.2)
RBC # BLD AUTO: 4.41 10E6/UL (ref 3.8–5.2)
RBC #/AREA URNS AUTO: ABNORMAL /HPF
SODIUM SERPL-SCNC: 133 MMOL/L (ref 135–145)
SODIUM SERPL-SCNC: 134 MMOL/L (ref 135–145)
SODIUM SERPL-SCNC: 138 MMOL/L (ref 135–145)
SP GR UR STRIP: 1.01 (ref 1–1.03)
SPECIMEN EXPIRATION DATE: NORMAL
SQUAMOUS #/AREA URNS AUTO: ABNORMAL /LPF
UROBILINOGEN UR STRIP-ACNC: 0.2 E.U./DL
WBC # BLD AUTO: 10 10E3/UL (ref 4–11)
WBC # BLD AUTO: 12 10E3/UL (ref 4–11)
WBC # BLD AUTO: 7.5 10E3/UL (ref 4–11)
WBC #/AREA URNS AUTO: ABNORMAL /HPF
WBC CLUMPS #/AREA URNS HPF: PRESENT /HPF

## 2023-01-01 PROCEDURE — 86901 BLOOD TYPING SEROLOGIC RH(D): CPT | Performed by: EMERGENCY MEDICINE

## 2023-01-01 PROCEDURE — 80048 BASIC METABOLIC PNL TOTAL CA: CPT | Mod: ORL | Performed by: NURSE PRACTITIONER

## 2023-01-01 PROCEDURE — 80048 BASIC METABOLIC PNL TOTAL CA: CPT | Performed by: EMERGENCY MEDICINE

## 2023-01-01 PROCEDURE — P9603 ONE-WAY ALLOW PRORATED MILES: HCPCS | Mod: ORL | Performed by: NURSE PRACTITIONER

## 2023-01-01 PROCEDURE — 36415 COLL VENOUS BLD VENIPUNCTURE: CPT | Mod: ORL | Performed by: NURSE PRACTITIONER

## 2023-01-01 PROCEDURE — 71045 X-RAY EXAM CHEST 1 VIEW: CPT

## 2023-01-01 PROCEDURE — 85018 HEMOGLOBIN: CPT | Performed by: EMERGENCY MEDICINE

## 2023-01-01 PROCEDURE — 96374 THER/PROPH/DIAG INJ IV PUSH: CPT

## 2023-01-01 PROCEDURE — 74176 CT ABD & PELVIS W/O CONTRAST: CPT

## 2023-01-01 PROCEDURE — C9113 INJ PANTOPRAZOLE SODIUM, VIA: HCPCS | Mod: JZ | Performed by: EMERGENCY MEDICINE

## 2023-01-01 PROCEDURE — 258N000003 HC RX IP 258 OP 636: Performed by: EMERGENCY MEDICINE

## 2023-01-01 PROCEDURE — 85027 COMPLETE CBC AUTOMATED: CPT | Mod: ORL | Performed by: NURSE PRACTITIONER

## 2023-01-01 PROCEDURE — 81001 URINALYSIS AUTO W/SCOPE: CPT

## 2023-01-01 PROCEDURE — 250N000011 HC RX IP 250 OP 636: Mod: JZ | Performed by: EMERGENCY MEDICINE

## 2023-01-01 PROCEDURE — 86850 RBC ANTIBODY SCREEN: CPT | Performed by: EMERGENCY MEDICINE

## 2023-01-01 PROCEDURE — 85025 COMPLETE CBC W/AUTO DIFF WBC: CPT | Mod: ORL | Performed by: NURSE PRACTITIONER

## 2023-01-01 PROCEDURE — 36415 COLL VENOUS BLD VENIPUNCTURE: CPT | Performed by: EMERGENCY MEDICINE

## 2023-01-01 PROCEDURE — 87086 URINE CULTURE/COLONY COUNT: CPT | Mod: GZ

## 2023-01-01 PROCEDURE — 99285 EMERGENCY DEPT VISIT HI MDM: CPT | Mod: 25

## 2023-01-01 PROCEDURE — 83735 ASSAY OF MAGNESIUM: CPT | Mod: ORL | Performed by: NURSE PRACTITIONER

## 2023-01-01 PROCEDURE — P9604 ONE-WAY ALLOW PRORATED TRIP: HCPCS | Mod: ORL | Performed by: NURSE PRACTITIONER

## 2023-01-01 PROCEDURE — 85025 COMPLETE CBC W/AUTO DIFF WBC: CPT | Performed by: EMERGENCY MEDICINE

## 2023-01-01 PROCEDURE — 85610 PROTHROMBIN TIME: CPT | Performed by: EMERGENCY MEDICINE

## 2023-01-01 PROCEDURE — 83605 ASSAY OF LACTIC ACID: CPT | Performed by: EMERGENCY MEDICINE

## 2023-01-01 PROCEDURE — 96361 HYDRATE IV INFUSION ADD-ON: CPT

## 2023-01-01 RX ORDER — SODIUM CHLORIDE 9 MG/ML
INJECTION, SOLUTION INTRAVENOUS CONTINUOUS
Status: DISCONTINUED | OUTPATIENT
Start: 2023-01-01 | End: 2023-01-01 | Stop reason: HOSPADM

## 2023-01-01 RX ADMIN — PANTOPRAZOLE SODIUM 40 MG: 40 INJECTION, POWDER, FOR SOLUTION INTRAVENOUS at 11:35

## 2023-01-01 RX ADMIN — SODIUM CHLORIDE: 9 INJECTION, SOLUTION INTRAVENOUS at 11:30

## 2023-01-01 ASSESSMENT — ENCOUNTER SYMPTOMS
COUGH: 1
BLOOD IN STOOL: 1
DIARRHEA: 1

## 2023-01-01 ASSESSMENT — ACTIVITIES OF DAILY LIVING (ADL)
ADLS_ACUITY_SCORE: 35

## 2023-02-14 ENCOUNTER — LAB REQUISITION (OUTPATIENT)
Dept: LAB | Facility: CLINIC | Age: 88
End: 2023-02-14
Payer: COMMERCIAL

## 2023-02-14 DIAGNOSIS — N13.39 OTHER HYDRONEPHROSIS: ICD-10-CM

## 2023-02-15 LAB
ANION GAP SERPL CALCULATED.3IONS-SCNC: 13 MMOL/L (ref 7–15)
BASOPHILS # BLD AUTO: 0 10E3/UL (ref 0–0.2)
BASOPHILS NFR BLD AUTO: 1 %
BUN SERPL-MCNC: 33.3 MG/DL (ref 8–23)
CALCIUM SERPL-MCNC: 9.4 MG/DL (ref 8.8–10.2)
CHLORIDE SERPL-SCNC: 99 MMOL/L (ref 98–107)
CREAT SERPL-MCNC: 1.58 MG/DL (ref 0.51–0.95)
DEPRECATED HCO3 PLAS-SCNC: 25 MMOL/L (ref 22–29)
EOSINOPHIL # BLD AUTO: 0.3 10E3/UL (ref 0–0.7)
EOSINOPHIL NFR BLD AUTO: 5 %
ERYTHROCYTE [DISTWIDTH] IN BLOOD BY AUTOMATED COUNT: 15.2 % (ref 10–15)
GFR SERPL CREATININE-BSD FRML MDRD: 31 ML/MIN/1.73M2
GLUCOSE SERPL-MCNC: 90 MG/DL (ref 70–99)
HCT VFR BLD AUTO: 33.4 % (ref 35–47)
HGB BLD-MCNC: 10.7 G/DL (ref 11.7–15.7)
IMM GRANULOCYTES # BLD: 0 10E3/UL
IMM GRANULOCYTES NFR BLD: 1 %
LYMPHOCYTES # BLD AUTO: 0.8 10E3/UL (ref 0.8–5.3)
LYMPHOCYTES NFR BLD AUTO: 15 %
MCH RBC QN AUTO: 28.5 PG (ref 26.5–33)
MCHC RBC AUTO-ENTMCNC: 32 G/DL (ref 31.5–36.5)
MCV RBC AUTO: 89 FL (ref 78–100)
MONOCYTES # BLD AUTO: 0.7 10E3/UL (ref 0–1.3)
MONOCYTES NFR BLD AUTO: 12 %
NEUTROPHILS # BLD AUTO: 3.7 10E3/UL (ref 1.6–8.3)
NEUTROPHILS NFR BLD AUTO: 66 %
NRBC # BLD AUTO: 0 10E3/UL
NRBC BLD AUTO-RTO: 0 /100
PLATELET # BLD AUTO: 198 10E3/UL (ref 150–450)
POTASSIUM SERPL-SCNC: 4 MMOL/L (ref 3.4–5.3)
RBC # BLD AUTO: 3.75 10E6/UL (ref 3.8–5.2)
SODIUM SERPL-SCNC: 137 MMOL/L (ref 136–145)
WBC # BLD AUTO: 5.5 10E3/UL (ref 4–11)

## 2023-02-15 PROCEDURE — 36415 COLL VENOUS BLD VENIPUNCTURE: CPT | Mod: ORL | Performed by: PHYSICIAN ASSISTANT

## 2023-02-15 PROCEDURE — 80048 BASIC METABOLIC PNL TOTAL CA: CPT | Mod: ORL | Performed by: PHYSICIAN ASSISTANT

## 2023-02-15 PROCEDURE — P9603 ONE-WAY ALLOW PRORATED MILES: HCPCS | Mod: ORL | Performed by: PHYSICIAN ASSISTANT

## 2023-02-15 PROCEDURE — 85025 COMPLETE CBC W/AUTO DIFF WBC: CPT | Mod: ORL | Performed by: PHYSICIAN ASSISTANT

## 2023-03-13 ENCOUNTER — ANESTHESIA EVENT (OUTPATIENT)
Dept: SURGERY | Facility: HOSPITAL | Age: 88
End: 2023-03-13
Payer: COMMERCIAL

## 2023-03-13 ENCOUNTER — HOSPITAL ENCOUNTER (OUTPATIENT)
Facility: HOSPITAL | Age: 88
Discharge: HOME | End: 2023-03-13
Attending: UROLOGY | Admitting: UROLOGY
Payer: COMMERCIAL

## 2023-03-13 ENCOUNTER — ANESTHESIA (OUTPATIENT)
Dept: SURGERY | Facility: HOSPITAL | Age: 88
End: 2023-03-13
Payer: COMMERCIAL

## 2023-03-13 VITALS
HEART RATE: 74 BPM | SYSTOLIC BLOOD PRESSURE: 132 MMHG | WEIGHT: 209.4 LBS | BODY MASS INDEX: 32.8 KG/M2 | DIASTOLIC BLOOD PRESSURE: 73 MMHG | OXYGEN SATURATION: 100 % | TEMPERATURE: 98.3 F | RESPIRATION RATE: 18 BRPM

## 2023-03-13 PROCEDURE — 999N000141 HC STATISTIC PRE-PROCEDURE NURSING ASSESSMENT: Performed by: UROLOGY

## 2023-03-13 RX ORDER — SODIUM CHLORIDE, SODIUM LACTATE, POTASSIUM CHLORIDE, CALCIUM CHLORIDE 600; 310; 30; 20 MG/100ML; MG/100ML; MG/100ML; MG/100ML
INJECTION, SOLUTION INTRAVENOUS CONTINUOUS
Status: DISCONTINUED | OUTPATIENT
Start: 2023-03-13 | End: 2023-03-14 | Stop reason: HOSPADM

## 2023-03-13 RX ORDER — MAGNESIUM SULFATE 4 G/50ML
4 INJECTION INTRAVENOUS ONCE
Status: DISCONTINUED | OUTPATIENT
Start: 2023-03-13 | End: 2023-03-14 | Stop reason: HOSPADM

## 2023-03-13 RX ORDER — CEFAZOLIN SODIUM/WATER 2 G/20 ML
2 SYRINGE (ML) INTRAVENOUS SEE ADMIN INSTRUCTIONS
Status: DISCONTINUED | OUTPATIENT
Start: 2023-03-13 | End: 2023-03-14 | Stop reason: HOSPADM

## 2023-03-13 RX ORDER — CEFAZOLIN SODIUM/WATER 2 G/20 ML
2 SYRINGE (ML) INTRAVENOUS
Status: DISCONTINUED | OUTPATIENT
Start: 2023-03-13 | End: 2023-03-14 | Stop reason: HOSPADM

## 2023-03-13 RX ORDER — ACETAMINOPHEN 325 MG/1
975 TABLET ORAL ONCE
Status: DISCONTINUED | OUTPATIENT
Start: 2023-03-13 | End: 2023-03-14 | Stop reason: HOSPADM

## 2023-03-13 RX ORDER — LIDOCAINE 40 MG/G
CREAM TOPICAL
Status: DISCONTINUED | OUTPATIENT
Start: 2023-03-13 | End: 2023-03-14 | Stop reason: HOSPADM

## 2023-03-13 ASSESSMENT — ACTIVITIES OF DAILY LIVING (ADL)
ADLS_ACUITY_SCORE: 35
ADLS_ACUITY_SCORE: 33
ADLS_ACUITY_SCORE: 35

## 2023-03-13 NOTE — OR NURSING
Patient had chicken noodle soup and lemonade for lunch so surgeon informed and will reschedule procedure.

## 2023-03-14 ASSESSMENT — ACTIVITIES OF DAILY LIVING (ADL)
ADLS_ACUITY_SCORE: 35

## 2023-03-20 ENCOUNTER — APPOINTMENT (OUTPATIENT)
Dept: RADIOLOGY | Facility: HOSPITAL | Age: 88
End: 2023-03-20
Attending: UROLOGY
Payer: COMMERCIAL

## 2023-03-20 ENCOUNTER — HOSPITAL ENCOUNTER (OUTPATIENT)
Facility: HOSPITAL | Age: 88
Setting detail: OBSERVATION
Discharge: NURSING, REHAB OR LONG-TERM CARE | End: 2023-03-21
Attending: UROLOGY | Admitting: UROLOGY
Payer: COMMERCIAL

## 2023-03-20 DIAGNOSIS — I10 ESSENTIAL HYPERTENSION: Primary | ICD-10-CM

## 2023-03-20 DIAGNOSIS — K59.00 CONSTIPATION, UNSPECIFIED CONSTIPATION TYPE: ICD-10-CM

## 2023-03-20 LAB — LACTATE SERPL-SCNC: 2.2 MMOL/L (ref 0.7–2)

## 2023-03-20 PROCEDURE — 96374 THER/PROPH/DIAG INJ IV PUSH: CPT

## 2023-03-20 PROCEDURE — 94660 CPAP INITIATION&MGMT: CPT

## 2023-03-20 PROCEDURE — 96376 TX/PRO/DX INJ SAME DRUG ADON: CPT

## 2023-03-20 PROCEDURE — 710N000009 HC RECOVERY PHASE 1, LEVEL 1, PER MIN: Performed by: UROLOGY

## 2023-03-20 PROCEDURE — 250N000011 HC RX IP 250 OP 636: Performed by: ANESTHESIOLOGY

## 2023-03-20 PROCEDURE — 83605 ASSAY OF LACTIC ACID: CPT | Performed by: UROLOGY

## 2023-03-20 PROCEDURE — 36415 COLL VENOUS BLD VENIPUNCTURE: CPT | Performed by: UROLOGY

## 2023-03-20 PROCEDURE — 250N000009 HC RX 250: Performed by: ANESTHESIOLOGY

## 2023-03-20 PROCEDURE — 710N000012 HC RECOVERY PHASE 2, PER MINUTE: Performed by: UROLOGY

## 2023-03-20 PROCEDURE — 250N000011 HC RX IP 250 OP 636: Performed by: NURSE PRACTITIONER

## 2023-03-20 PROCEDURE — 272N000001 HC OR GENERAL SUPPLY STERILE: Performed by: UROLOGY

## 2023-03-20 PROCEDURE — 250N000009 HC RX 250: Performed by: UROLOGY

## 2023-03-20 PROCEDURE — 255N000002 HC RX 255 OP 636: Performed by: UROLOGY

## 2023-03-20 PROCEDURE — 250N000013 HC RX MED GY IP 250 OP 250 PS 637: Performed by: UROLOGY

## 2023-03-20 PROCEDURE — 258N000003 HC RX IP 258 OP 636: Performed by: ANESTHESIOLOGY

## 2023-03-20 PROCEDURE — C2617 STENT, NON-COR, TEM W/O DEL: HCPCS | Performed by: UROLOGY

## 2023-03-20 PROCEDURE — 360N000082 HC SURGERY LEVEL 2 W/ FLUORO, PER MIN: Performed by: UROLOGY

## 2023-03-20 PROCEDURE — 999N000180 XR SURGERY CARM FLUORO LESS THAN 5 MIN

## 2023-03-20 PROCEDURE — 370N000017 HC ANESTHESIA TECHNICAL FEE, PER MIN: Performed by: UROLOGY

## 2023-03-20 PROCEDURE — 250N000011 HC RX IP 250 OP 636: Performed by: UROLOGY

## 2023-03-20 PROCEDURE — 999N000157 HC STATISTIC RCP TIME EA 10 MIN

## 2023-03-20 PROCEDURE — 999N000141 HC STATISTIC PRE-PROCEDURE NURSING ASSESSMENT: Performed by: UROLOGY

## 2023-03-20 PROCEDURE — 96375 TX/PRO/DX INJ NEW DRUG ADDON: CPT

## 2023-03-20 PROCEDURE — C1769 GUIDE WIRE: HCPCS | Performed by: UROLOGY

## 2023-03-20 PROCEDURE — G0378 HOSPITAL OBSERVATION PER HR: HCPCS

## 2023-03-20 DEVICE — IMPLANTABLE DEVICE: Type: IMPLANTABLE DEVICE | Site: URETER | Status: FUNCTIONAL

## 2023-03-20 RX ORDER — NALOXONE HYDROCHLORIDE 0.4 MG/ML
0.2 INJECTION, SOLUTION INTRAMUSCULAR; INTRAVENOUS; SUBCUTANEOUS
Status: DISCONTINUED | OUTPATIENT
Start: 2023-03-20 | End: 2023-03-21 | Stop reason: HOSPADM

## 2023-03-20 RX ORDER — LIDOCAINE HYDROCHLORIDE 10 MG/ML
INJECTION, SOLUTION INFILTRATION; PERINEURAL PRN
Status: DISCONTINUED | OUTPATIENT
Start: 2023-03-20 | End: 2023-03-20

## 2023-03-20 RX ORDER — SODIUM CHLORIDE, SODIUM LACTATE, POTASSIUM CHLORIDE, CALCIUM CHLORIDE 600; 310; 30; 20 MG/100ML; MG/100ML; MG/100ML; MG/100ML
INJECTION, SOLUTION INTRAVENOUS CONTINUOUS
Status: DISCONTINUED | OUTPATIENT
Start: 2023-03-20 | End: 2023-03-20 | Stop reason: HOSPADM

## 2023-03-20 RX ORDER — LOSARTAN POTASSIUM 50 MG/1
100 TABLET ORAL DAILY
Status: DISCONTINUED | OUTPATIENT
Start: 2023-03-21 | End: 2023-03-21 | Stop reason: HOSPADM

## 2023-03-20 RX ORDER — LIDOCAINE 40 MG/G
CREAM TOPICAL
Status: DISCONTINUED | OUTPATIENT
Start: 2023-03-20 | End: 2023-03-20 | Stop reason: HOSPADM

## 2023-03-20 RX ORDER — DULOXETIN HYDROCHLORIDE 60 MG/1
60 CAPSULE, DELAYED RELEASE ORAL DAILY
Status: DISCONTINUED | OUTPATIENT
Start: 2023-03-21 | End: 2023-03-21 | Stop reason: HOSPADM

## 2023-03-20 RX ORDER — FENTANYL CITRATE 50 UG/ML
25 INJECTION, SOLUTION INTRAMUSCULAR; INTRAVENOUS
Status: DISCONTINUED | OUTPATIENT
Start: 2023-03-20 | End: 2023-03-20 | Stop reason: HOSPADM

## 2023-03-20 RX ORDER — AMOXICILLIN 250 MG
1 CAPSULE ORAL AT BEDTIME
Status: DISCONTINUED | OUTPATIENT
Start: 2023-03-20 | End: 2023-03-21 | Stop reason: HOSPADM

## 2023-03-20 RX ORDER — CEFAZOLIN SODIUM/WATER 2 G/20 ML
2 SYRINGE (ML) INTRAVENOUS
Status: COMPLETED | OUTPATIENT
Start: 2023-03-20 | End: 2023-03-20

## 2023-03-20 RX ORDER — DEXAMETHASONE SODIUM PHOSPHATE 10 MG/ML
INJECTION, SOLUTION INTRAMUSCULAR; INTRAVENOUS PRN
Status: DISCONTINUED | OUTPATIENT
Start: 2023-03-20 | End: 2023-03-20

## 2023-03-20 RX ORDER — ACETAMINOPHEN 500 MG
1000 TABLET ORAL 3 TIMES DAILY PRN
Status: DISCONTINUED | OUTPATIENT
Start: 2023-03-20 | End: 2023-03-21 | Stop reason: HOSPADM

## 2023-03-20 RX ORDER — FENTANYL CITRATE 50 UG/ML
INJECTION, SOLUTION INTRAMUSCULAR; INTRAVENOUS PRN
Status: DISCONTINUED | OUTPATIENT
Start: 2023-03-20 | End: 2023-03-20

## 2023-03-20 RX ORDER — ALBUTEROL SULFATE 90 UG/1
2 AEROSOL, METERED RESPIRATORY (INHALATION) EVERY 4 HOURS PRN
Status: DISCONTINUED | OUTPATIENT
Start: 2023-03-20 | End: 2023-03-21 | Stop reason: HOSPADM

## 2023-03-20 RX ORDER — OXYCODONE HYDROCHLORIDE 5 MG/1
10 TABLET ORAL EVERY 4 HOURS PRN
Status: DISCONTINUED | OUTPATIENT
Start: 2023-03-20 | End: 2023-03-21 | Stop reason: HOSPADM

## 2023-03-20 RX ORDER — NALOXONE HYDROCHLORIDE 0.4 MG/ML
0.4 INJECTION, SOLUTION INTRAMUSCULAR; INTRAVENOUS; SUBCUTANEOUS
Status: DISCONTINUED | OUTPATIENT
Start: 2023-03-20 | End: 2023-03-21 | Stop reason: HOSPADM

## 2023-03-20 RX ORDER — PROPOFOL 10 MG/ML
INJECTION, EMULSION INTRAVENOUS PRN
Status: DISCONTINUED | OUTPATIENT
Start: 2023-03-20 | End: 2023-03-20

## 2023-03-20 RX ORDER — PROPOFOL 10 MG/ML
INJECTION, EMULSION INTRAVENOUS CONTINUOUS PRN
Status: DISCONTINUED | OUTPATIENT
Start: 2023-03-20 | End: 2023-03-20

## 2023-03-20 RX ORDER — HYDROMORPHONE HYDROCHLORIDE 1 MG/ML
0.4 INJECTION, SOLUTION INTRAMUSCULAR; INTRAVENOUS; SUBCUTANEOUS
Status: DISCONTINUED | OUTPATIENT
Start: 2023-03-20 | End: 2023-03-21 | Stop reason: HOSPADM

## 2023-03-20 RX ORDER — GLYCOPYRROLATE 0.2 MG/ML
INJECTION, SOLUTION INTRAMUSCULAR; INTRAVENOUS PRN
Status: DISCONTINUED | OUTPATIENT
Start: 2023-03-20 | End: 2023-03-20

## 2023-03-20 RX ORDER — ATORVASTATIN CALCIUM 10 MG/1
20 TABLET, FILM COATED ORAL AT BEDTIME
Status: DISCONTINUED | OUTPATIENT
Start: 2023-03-20 | End: 2023-03-21 | Stop reason: HOSPADM

## 2023-03-20 RX ORDER — KETOROLAC TROMETHAMINE 30 MG/ML
15 INJECTION, SOLUTION INTRAMUSCULAR; INTRAVENOUS EVERY 6 HOURS
Status: DISCONTINUED | OUTPATIENT
Start: 2023-03-20 | End: 2023-03-21 | Stop reason: HOSPADM

## 2023-03-20 RX ORDER — ONDANSETRON 2 MG/ML
INJECTION INTRAMUSCULAR; INTRAVENOUS PRN
Status: DISCONTINUED | OUTPATIENT
Start: 2023-03-20 | End: 2023-03-20

## 2023-03-20 RX ORDER — OXYCODONE HYDROCHLORIDE 5 MG/1
5 TABLET ORAL EVERY 4 HOURS PRN
Status: DISCONTINUED | OUTPATIENT
Start: 2023-03-20 | End: 2023-03-21 | Stop reason: HOSPADM

## 2023-03-20 RX ORDER — HYDROMORPHONE HYDROCHLORIDE 1 MG/ML
0.2 INJECTION, SOLUTION INTRAMUSCULAR; INTRAVENOUS; SUBCUTANEOUS
Status: DISCONTINUED | OUTPATIENT
Start: 2023-03-20 | End: 2023-03-21 | Stop reason: HOSPADM

## 2023-03-20 RX ORDER — LIDOCAINE 40 MG/G
CREAM TOPICAL
Status: DISCONTINUED | OUTPATIENT
Start: 2023-03-20 | End: 2023-03-21 | Stop reason: HOSPADM

## 2023-03-20 RX ORDER — CEFAZOLIN SODIUM/WATER 2 G/20 ML
2 SYRINGE (ML) INTRAVENOUS SEE ADMIN INSTRUCTIONS
Status: DISCONTINUED | OUTPATIENT
Start: 2023-03-20 | End: 2023-03-20 | Stop reason: HOSPADM

## 2023-03-20 RX ORDER — AMOXICILLIN 250 MG
1 CAPSULE ORAL 2 TIMES DAILY PRN
Status: DISCONTINUED | OUTPATIENT
Start: 2023-03-20 | End: 2023-03-21 | Stop reason: HOSPADM

## 2023-03-20 RX ADMIN — ONDANSETRON 4 MG: 2 INJECTION INTRAMUSCULAR; INTRAVENOUS at 16:50

## 2023-03-20 RX ADMIN — FENTANYL CITRATE 50 MCG: 50 INJECTION, SOLUTION INTRAMUSCULAR; INTRAVENOUS at 16:55

## 2023-03-20 RX ADMIN — KETOROLAC TROMETHAMINE 15 MG: 30 INJECTION, SOLUTION INTRAMUSCULAR; INTRAVENOUS at 23:48

## 2023-03-20 RX ADMIN — TRAZODONE HYDROCHLORIDE 75 MG: 50 TABLET ORAL at 21:49

## 2023-03-20 RX ADMIN — LIDOCAINE HYDROCHLORIDE 2 ML: 10 INJECTION, SOLUTION INFILTRATION; PERINEURAL at 16:19

## 2023-03-20 RX ADMIN — DEXAMETHASONE SODIUM PHOSPHATE 5 MG: 10 INJECTION, SOLUTION INTRAMUSCULAR; INTRAVENOUS at 16:50

## 2023-03-20 RX ADMIN — SENNOSIDES AND DOCUSATE SODIUM 1 TABLET: 50; 8.6 TABLET ORAL at 21:52

## 2023-03-20 RX ADMIN — GLYCOPYRROLATE 0.2 MG: 0.2 INJECTION INTRAMUSCULAR; INTRAVENOUS at 16:17

## 2023-03-20 RX ADMIN — PROPOFOL 100 MCG/KG/MIN: 10 INJECTION, EMULSION INTRAVENOUS at 16:24

## 2023-03-20 RX ADMIN — Medication 2 G: at 16:23

## 2023-03-20 RX ADMIN — SODIUM CHLORIDE, POTASSIUM CHLORIDE, SODIUM LACTATE AND CALCIUM CHLORIDE: 600; 310; 30; 20 INJECTION, SOLUTION INTRAVENOUS at 14:55

## 2023-03-20 RX ADMIN — PROPOFOL 30 MG: 10 INJECTION, EMULSION INTRAVENOUS at 16:56

## 2023-03-20 RX ADMIN — ATORVASTATIN CALCIUM 20 MG: 10 TABLET, FILM COATED ORAL at 21:50

## 2023-03-20 RX ADMIN — KETOROLAC TROMETHAMINE 15 MG: 30 INJECTION, SOLUTION INTRAMUSCULAR; INTRAVENOUS at 18:48

## 2023-03-20 ASSESSMENT — ACTIVITIES OF DAILY LIVING (ADL)
ADLS_ACUITY_SCORE: 35
ADLS_ACUITY_SCORE: 35
ADLS_ACUITY_SCORE: 37
ADLS_ACUITY_SCORE: 33
ADLS_ACUITY_SCORE: 37

## 2023-03-20 ASSESSMENT — COLUMBIA-SUICIDE SEVERITY RATING SCALE - C-SSRS
5. HAVE YOU STARTED TO WORK OUT OR WORKED OUT THE DETAILS OF HOW TO KILL YOURSELF? DO YOU INTEND TO CARRY OUT THIS PLAN?: NO
3. HAVE YOU BEEN THINKING ABOUT HOW YOU MIGHT KILL YOURSELF?: NO
6. HAVE YOU EVER DONE ANYTHING, STARTED TO DO ANYTHING, OR PREPARED TO DO ANYTHING TO END YOUR LIFE?: NO
1. IN THE PAST MONTH, HAVE YOU WISHED YOU WERE DEAD OR WISHED YOU COULD GO TO SLEEP AND NOT WAKE UP?: NO
2. HAVE YOU ACTUALLY HAD ANY THOUGHTS OF KILLING YOURSELF IN THE PAST MONTH?: NO
4. HAVE YOU HAD THESE THOUGHTS AND HAD SOME INTENTION OF ACTING ON THEM?: NO

## 2023-03-20 ASSESSMENT — COPD QUESTIONNAIRES: COPD: 1

## 2023-03-20 ASSESSMENT — ENCOUNTER SYMPTOMS: DYSRHYTHMIAS: 1

## 2023-03-20 NOTE — ANESTHESIA CARE TRANSFER NOTE
Patient: Bessy Sevilla    Procedure: Procedure(s):  CYSTOSCOPY, LEFT RETROGRADE PYELOGRAM, LEFT URETERAL STENT EXCHANGE       Diagnosis: Unspecified hydronephrosis [N13.30]  Diagnosis Additional Information: No value filed.    Anesthesia Type:   MAC     Note:    Oropharynx: oropharynx clear of all foreign objects  Level of Consciousness: awake  Oxygen Supplementation: face mask  Level of Supplemental Oxygen (L/min / FiO2): 10  Independent Airway: airway patency satisfactory and stable  Dentition: dentition unchanged  Vital Signs Stable: post-procedure vital signs reviewed and stable  Report to RN Given: handoff report given  Patient transferred to: PACU    Handoff Report: Identifed the Patient, Identified the Reponsible Provider, Reviewed the pertinent medical history, Discussed the surgical course, Reviewed Intra-OP anesthesia mangement and issues during anesthesia, Set expectations for post-procedure period and Allowed opportunity for questions and acknowledgement of understanding      Vitals:  Vitals Value Taken Time   /81 03/20/23 1717   Temp 98.2 F 17:15   Pulse 97 03/20/23 1720   Resp 14 17:15   SpO2 96 % 03/20/23 1720   Vitals shown include unvalidated device data.    Electronically Signed By: ARMEN Avina CRNA  March 20, 2023  5:21 PM

## 2023-03-20 NOTE — ANESTHESIA POSTPROCEDURE EVALUATION
Patient: Bessy Sevilla    Procedure: Procedure(s):  CYSTOSCOPY, LEFT RETROGRADE PYELOGRAM, LEFT URETERAL STENT EXCHANGE       Anesthesia Type:  MAC    Note:  Disposition: Inpatient   Postop Pain Control: Uneventful            Sign Out: Well controlled pain   PONV: No   Neuro/Psych: Uneventful            Sign Out: Acceptable/Baseline neuro status   Airway/Respiratory: Uneventful            Sign Out: Acceptable/Baseline resp. status   CV/Hemodynamics: Uneventful            Sign Out: Acceptable CV status; No obvious hypovolemia; No obvious fluid overload   Other NRE: NONE   DID A NON-ROUTINE EVENT OCCUR? No           Last vitals:  Vitals Value Taken Time   /77 03/20/23 1745   Temp 36.7  C (98  F) 03/20/23 1717   Pulse 74 03/20/23 1756   Resp 18 03/20/23 1717   SpO2 100 % 03/20/23 1756   Vitals shown include unvalidated device data.    Electronically Signed By: Cyndie Nunn MD  March 20, 2023  5:58 PM

## 2023-03-20 NOTE — ANESTHESIA PREPROCEDURE EVALUATION
Anesthesia Pre-Procedure Evaluation    Patient: Bessy Sevilla   MRN: [id number] : 1934        Procedure : Procedure(s):  Cystoscopy, L retrograde pyelogram and stent exchange            Past Medical History:   Diagnosis Date     Acute cystitis      Acute kidney injury (H)      Anemia      Asthma      Bradycardia      Bronchiectasis (H)      Cervical cancer (H)      CKD (chronic kidney disease) stage 3, GFR 30-59 ml/min (H)      COPD (chronic obstructive pulmonary disease) (H)      DVT (deep venous thrombosis) (H)      Dysuria      Rajput catheter in place      GIB (gastrointestinal bleeding)      Glaucoma      HLD (hyperlipidemia)      HTN (hypertension)      Hydronephrosis      Idiopathic acute pancreatitis      Insomnia      Kidney stone      Mild cognitive impairment      MRSA (methicillin resistant staphylococcus aureus) pneumonia (H)      LANRE (obstructive sleep apnea)      Osteoarthritis      Paroxysmal atrial fibrillation (H)      Pulmonary hypertension (H)      PVC's (premature ventricular contractions)      Spinal stenosis in cervical region      Spinal stenosis, lumbar      Systolic heart failure (H)      Transfusion history     Antibody     Vitamin D deficiency       Past Surgical History:   Procedure Laterality Date     CATARACT EXTRACTION       COLONOSCOPY N/A 2022    Procedure: COLONOSCOPY;  Surgeon: Dallas Rahman MD;  Location: [facility] OR     COMBINED CYSTOSCOPY, RETROGRADES, EXCHANGE STENT URETER(S) Left 2022    Procedure: CYSTOSCOPY, WITH RETROGRADE PYELOGRAM AND URETERAL STENT REPLACEMENT;  Surgeon: Hilario Viveros MD;  Location: [facility] OR     ESOPHAGOSCOPY, GASTROSCOPY, DUODENOSCOPY (EGD), COMBINED N/A 2022    Procedure: ESOPHAGOGASTRODUODENOSCOPY;  Surgeon: Dallas Rahman MD;  Location: [facility] OR     IR ABSCESS DRAIN INSERTION  2016     LAPAROSCOPIC CHOLECYSTECTOMY N/A 2016    Procedure: CHOLECYSTECTOMY LAPAROSCOPIC;   Surgeon: Brianna Cast MD;  Location: [facility];  Service:      ROTATOR CUFF REPAIR RT/LT  2010     SPINAL FUSION        Allergies   Allergen Reactions     Ciprofloxacin      Sulfa (Sulfonamide Antibiotics) [Sulfa Drugs] Swelling      Social History     Tobacco Use     Smoking status: Never     Smokeless tobacco: Never   Substance Use Topics     Alcohol use: No      Wt Readings from Last 1 Encounters:   03/20/23 94.9 kg (209 lb 3.2 oz)        Anesthesia Evaluation   Pt has had prior anesthetic.     No history of anesthetic complications       ROS/MED HX  ENT/Pulmonary:     (+) sleep apnea, uses CPAP, asthma COPD,     Neurologic:  - neg neurologic ROS   (+) dementia,     Cardiovascular:     (+) Dyslipidemia hypertension-----dysrhythmias, a-fib, pulmonary hypertension,     METS/Exercise Tolerance: >4 METS    Hematologic:     (+) anemia,     Musculoskeletal:  - neg musculoskeletal ROS     GI/Hepatic:  - neg GI/hepatic ROS     Renal/Genitourinary:     (+) renal disease, type: CRI,     Endo:     (+) Obesity (morbid),     Psychiatric/Substance Use:  - neg psychiatric ROS     Infectious Disease:  - neg infectious disease ROS     Malignancy:  - neg malignancy ROS     Other:  - neg other ROS          Physical Exam    Airway  airway exam normal      Mallampati: II   TM distance: > 3 FB   Neck ROM: full   Mouth opening: > 3 cm    Respiratory Devices and Support         Dental       (+) Edentulous      Cardiovascular   cardiovascular exam normal       Rhythm and rate: regular and normal     Pulmonary   pulmonary exam normal        breath sounds clear to auscultation           OUTSIDE LABS:  CBC:   Lab Results   Component Value Date    WBC 5.5 02/15/2023    WBC 5.8 09/21/2022    HGB 10.7 (L) 02/15/2023    HGB 11.3 (L) 11/30/2022    HCT 33.4 (L) 02/15/2023    HCT 27.9 (L) 09/21/2022     02/15/2023     09/21/2022     BMP:   Lab Results   Component Value Date     02/15/2023     (L) 09/21/2022     POTASSIUM 4.0 02/15/2023    POTASSIUM 3.9 09/21/2022    CHLORIDE 99 02/15/2023    CHLORIDE 95 (L) 09/21/2022    CO2 25 02/15/2023    CO2 26 09/21/2022    BUN 33.3 (H) 02/15/2023    BUN 23.6 (H) 09/21/2022    CR 1.58 (H) 02/15/2023    CR 1.28 (H) 09/21/2022    GLC 90 02/15/2023    GLC 92 09/21/2022     COAGS:   Lab Results   Component Value Date    PTT 32 08/14/2022    INR 1.35 (H) 08/15/2022     POC: No results found for: BGM, HCG, HCGS  HEPATIC:   Lab Results   Component Value Date    ALBUMIN 2.9 (L) 08/24/2022    PROTTOTAL 6.0 (L) 08/24/2022    ALT 26 08/24/2022    AST 35 08/24/2022    ALKPHOS 110 (H) 08/24/2022    BILITOTAL 0.2 08/24/2022     OTHER:   Lab Results   Component Value Date    LACT 1.4 08/15/2022    MOHINDER 9.4 02/15/2023    PHOS 4.6 (H) 08/15/2022    MAG 1.8 08/18/2022    TSH 1.98 11/03/2021       Anesthesia Plan    ASA Status:  3   NPO Status:  NPO Appropriate    Anesthesia Type: MAC.   Induction: Propofol.   Maintenance: TIVA.        Consents    Anesthesia Plan(s) and associated risks, benefits, and realistic alternatives discussed. Questions answered and patient/representative(s) expressed understanding.    - Discussed:     - Discussed with:  Patient, Legal Guardian      - Extended Intubation/Ventilatory Support Discussed: No.      - Patient is DNR/DNI Status: No    Use of blood products discussed: No .     Postoperative Care    Pain management: IV analgesics, Multi-modal analgesia, Oral pain medications.   PONV prophylaxis: Ondansetron (or other 5HT-3)     Comments:                    Ernie Mendoza MD

## 2023-03-20 NOTE — OP NOTE
OPERATIVE REPORT    Place of Service:  [facility]    Pre-operative Diagnosis: left hydronephrosos    Post-operative Diagnosis: Same    Operative Date:3/20/2023    Procedure:   1.) cystoscopy  2.) left retrograde pyelogram  3.) left ureteral stent change (RESONANCE METALLIC)    Surgeon: Hilario Viveros    Anesthesia: General    Estimated Blood Loss:  <5 ml    Complications: None    Findings:  left retrograde pyelogram shows no hydronephrosis with the stent in place.  Resonance metallic stent placed.      Summary of Procedure:    After informed consent was obtained the patient was brought back to the operating room.  The patient was prepped and draped in standard sterile fashion.  IV antibiotics were administered for prophylaxis.  A time out was performed.    The rigid cystoscope was introduced through the urethral meatus.  The bladder was completely inspected.  There were no abnormalities.  The distal end of the left ureteral stent was grasped and brought out through the meatus.  Under fluoroscopic guidance a Benstson guidewire was placed into the renal pelvis. Over the wire a 5Fr catheter was used to perform a retrograde pyelogram.  The wire was replaced, and a 6x22 Fr Resonance metallic stent was then placed using fluoroscopic guidance.  There was a good curl noted in the kidney and a good curl noted in the bladder.      The patients bladder was drained.  They tolerated the procedure well.  They were brought to the recovery room in stable condition.      Hilario Viveros MD

## 2023-03-20 NOTE — PROGRESS NOTES
Pt transferred to unit p2 in stable condition. Report given to Mathew BARBER RN. Verbal also written report sent.

## 2023-03-21 VITALS
HEART RATE: 72 BPM | BODY MASS INDEX: 33.8 KG/M2 | TEMPERATURE: 98.3 F | DIASTOLIC BLOOD PRESSURE: 51 MMHG | OXYGEN SATURATION: 96 % | SYSTOLIC BLOOD PRESSURE: 107 MMHG | RESPIRATION RATE: 18 BRPM | WEIGHT: 215.83 LBS

## 2023-03-21 LAB
LACTATE SERPL-SCNC: 2.1 MMOL/L (ref 0.7–2)
WBC # BLD AUTO: 12.6 10E3/UL (ref 4–11)

## 2023-03-21 PROCEDURE — 999N000157 HC STATISTIC RCP TIME EA 10 MIN

## 2023-03-21 PROCEDURE — 85048 AUTOMATED LEUKOCYTE COUNT: CPT | Performed by: NURSE PRACTITIONER

## 2023-03-21 PROCEDURE — 83605 ASSAY OF LACTIC ACID: CPT | Performed by: UROLOGY

## 2023-03-21 PROCEDURE — 36415 COLL VENOUS BLD VENIPUNCTURE: CPT | Performed by: NURSE PRACTITIONER

## 2023-03-21 PROCEDURE — 94660 CPAP INITIATION&MGMT: CPT

## 2023-03-21 PROCEDURE — 250N000013 HC RX MED GY IP 250 OP 250 PS 637: Performed by: UROLOGY

## 2023-03-21 PROCEDURE — G0378 HOSPITAL OBSERVATION PER HR: HCPCS

## 2023-03-21 PROCEDURE — 36415 COLL VENOUS BLD VENIPUNCTURE: CPT | Performed by: UROLOGY

## 2023-03-21 RX ORDER — AMOXICILLIN 250 MG
1 CAPSULE ORAL 2 TIMES DAILY PRN
Qty: 30 TABLET | Refills: 0 | Status: SHIPPED | OUTPATIENT
Start: 2023-03-21

## 2023-03-21 RX ORDER — LOSARTAN POTASSIUM 100 MG/1
100 TABLET ORAL DAILY
Start: 2023-03-21

## 2023-03-21 RX ADMIN — DULOXETINE HYDROCHLORIDE 60 MG: 60 CAPSULE, DELAYED RELEASE PELLETS ORAL at 08:54

## 2023-03-21 RX ADMIN — LOSARTAN POTASSIUM 100 MG: 50 TABLET, FILM COATED ORAL at 08:54

## 2023-03-21 RX ADMIN — SENNOSIDES AND DOCUSATE SODIUM 1 TABLET: 50; 8.6 TABLET ORAL at 10:41

## 2023-03-21 RX ADMIN — UMECLIDINIUM 1 PUFF: 62.5 AEROSOL, POWDER ORAL at 08:53

## 2023-03-21 RX ADMIN — FLUTICASONE FUROATE 1 PUFF: 100 POWDER RESPIRATORY (INHALATION) at 08:53

## 2023-03-21 ASSESSMENT — ACTIVITIES OF DAILY LIVING (ADL)
ADLS_ACUITY_SCORE: 35
ADLS_ACUITY_SCORE: 39
ADLS_ACUITY_SCORE: 35
ADLS_ACUITY_SCORE: 35
ADLS_ACUITY_SCORE: 39

## 2023-03-21 NOTE — DISCHARGE SUMMARY
[facility] DISCHARGE SUMMARY    Name: Bessy Sevilla  : 1934  MRN: [id number]  PCP: Clementine Watts    Place of service: [facility]    Admission date: 3/20/2023   Discharge date: 3/21/2023     Surgeon: Dr Hilario Viveros     Surgical Procedure: cystoscopy, left retrograde pyelogram, left ureteral stent change (RESONANCE METALLIC)    Date of surgery: 3/20/2023    Principal Diagnosis at Discharge:   Unspecified hydronephrosis [N13.30]    Other diagnosis addressed during hospitalization:  None    Consults:  None    Diagnostic Studies :  None    Complications while in the Hospital:  None    Physical Exam:  Temp: 98.3  F (36.8  C) Temp src: Oral BP: 107/51 Pulse: 72   Resp: 18 SpO2: 96 % O2 Device: None (Room air) Oxygen Delivery: 2 LPM    Gen: nad, alert  Neuro: A&O x 3. Moving all extremities equally.   Resp: Reg resp rate/depth.   Abdomen: Obese, soft, non-tender, no bilateral CVA tenderness.   : 16 Fr alex draining clear yellow urine, 300+ output so far today.   LE: CWMS intact.    Brief history of hospital course:  This is a 88 year old female admitted for Unspecified hydronephrosis [N13.30]. Patient underwent above procedure. Patient tolerated the procedure well. Post operative course was unremarkable with exception of elevated lactic acid, 2.2 on 3/20 and 2.1 on 3/21. WBC 12.7, suspect reactive to stent/surgery. Remains afebrile, without chills and feels well with good energy. Urine clear mitesh. By the day of discharge, the patient was ambulatory, tolerating diet, pain was controlled with oral analgesics, labs and vitals stable. Discharged home with chronic alex catheter.      Labs:  Lactic acid 3/21: 2.2  Lactic acid 3/20: 2.1  WBC 3/21: 12.6    Pending Labs:  Surgical Pathology    DISPOSITION: Home    DISCHARGE CONDITION: Good/Stable    DISCHARGE MEDICATIONS:      Review of your medicines      CONTINUE these medicines which may have CHANGED, or have new prescriptions. If we are  uncertain of the size of tablets/capsules you have at home, strength may be listed as something that might have changed.      Dose / Directions   losartan 100 MG tablet  Commonly known as: COZAAR  This may have changed: additional instructions  Used for: Essential hypertension      Dose: 100 mg  Take 1 tablet (100 mg) by mouth daily Hold if SBP < 140.  Refills: 0        CONTINUE these medicines which have NOT CHANGED      Dose / Directions   acetaminophen 500 MG tablet  Commonly known as: TYLENOL      Dose: 1,000 mg  Take 1,000 mg by mouth 3 times daily as needed for mild pain  Refills: 0     albuterol 108 (90 Base) MCG/ACT inhaler  Commonly known as: PROAIR HFA/PROVENTIL HFA/VENTOLIN HFA      Dose: 2 puff  Inhale 2 puffs into the lungs every 4 hours as needed  Refills: 0     atorvastatin 20 MG tablet  Commonly known as: LIPITOR      Dose: 20 mg  Take 20 mg by mouth At Bedtime  Refills: 0     cetirizine 5 MG tablet  Commonly known as: zyrTEC      Dose: 5 mg  Take 5 mg by mouth daily  Refills: 0     Combivent Respimat  MCG/ACT inhaler  Generic drug: ipratropium-albuterol      Dose: 1 puff  Inhale 1 puff into the lungs 4 times daily as needed for shortness of breath / dyspnea or wheezing  Refills: 0     DULoxetine 60 MG capsule  Commonly known as: CYMBALTA      Dose: 60 mg  [DULOXETINE (CYMBALTA) 60 MG CAPSULE] Take 60 mg by mouth daily.  Refills: 0     Qvar RediHaler 80 MCG/ACT inhaler  Generic drug: beclomethasone HFA      Dose: 2 puff  Inhale 2 puffs into the lungs 2 times daily  Refills: 0     senna-docusate 8.6-50 MG tablet  Commonly known as: SENOKOT-S/PERICOLACE  Used for: Constipation, unspecified constipation type      Dose: 1 tablet  Take 1 tablet by mouth 2 times daily as needed for constipation  Quantity: 30 tablet  Refills: 0     tiotropium 2.5 MCG/ACT inhaler  Commonly known as: SPIRIVA RESPIMAT      Dose: 2 puff  Inhale 2 puffs into the lungs daily  Refills: 0     traZODone 50 MG tablet  Commonly  known as: DESYREL      Dose: 75 mg  Take 75 mg by mouth At Bedtime  Refills: 0     vitamin B-12 500 MCG tablet  Commonly known as: CYANOCOBALAMIN      Dose: 1 tablet  Take 1 tablet by mouth daily  Refills: 0     vitamin C 500 MG tablet  Commonly known as: ASCORBIC ACID      Dose: 1,000 mg  Take 1,000 mg by mouth daily  Refills: 0     vitamin D3 50 mcg (2000 units) tablet  Commonly known as: CHOLECALCIFEROL      Dose: 2 tablet  Take 2 tablets by mouth daily  Refills: 0           Where to get your medicines      These medications were sent to [address]  [address] [place] [id number]    Phone: [phone]     senna-docusate 8.6-50 MG tablet         DISCHARGE PLAN:   - Follow up with Dr Hilario Viveros as scheduled prior to your procedure   - Follow up with Clementine Watts as directed (3-5 days).   - Take medication as prescribed, avoid anticoagulants per surgeon and PCP recommendations.   - Rajput care: As directed prior to discharge. Continue 1 month exchanges, next due 1 month from 3/20.   - Physical activity: As tolerated, no heavy lifting. No driving while taking narcotics.   - Diet: Clear liquid diet, advance to full liquid diet when passing gas and are free of nausea. You may advance to regular diet when you are passing gas regularly and as long as you remain without nausea.   - Medication: please review discharge Med req/AVS  - Warning signs discussed with patient about when to call the clinic/hospital.   - All questions and concerns were answered for the patient prior to discharge  - Patient verbalized understanding.     Discussed patient with Dr Hilario Viveros on day of discharge.     Ernesto Rudd, APRN, CNP  [facility]   [phone]     I saw the patient on the date of discharge  Total time spent for discharge on date of discharge: 20 minutes    Physician(s) in addition to primary physician who should receive a copy:  CC1: Clementine Watts            ?

## 2023-03-21 NOTE — PLAN OF CARE
"  PRIMARY DIAGNOSIS: \"GENERIC\" NURSING  OUTPATIENT/OBSERVATION GOALS TO BE MET BEFORE DISCHARGE:  ADLs back to baseline: Yes    Activity and level of assistance: wheelchair bound     Pain status: Pain free.    Return to near baseline physical activity: Yes     Discharge Planner Nurse   Safe discharge environment identified: Yes  Barriers to discharge: No       Entered by: Мария Lynn RN 03/21/2023 4:12 AM   Мария Lynn RN    Please review provider order for any additional goals.   Nurse to notify provider when observation goals have been met and patient is ready for discharge.  "

## 2023-03-21 NOTE — PLAN OF CARE
"PRIMARY DIAGNOSIS: \"GENERIC\" NURSING  OUTPATIENT/OBSERVATION GOALS TO BE MET BEFORE DISCHARGE:  ADLs back to baseline: Yes    Activity and level of assistance: Up with maximum assistance. Consider SW and/or PT evaluation.     Pain status: Pain free.    Return to near baseline physical activity: Yes     Discharge Planner Nurse   Safe discharge environment identified: Yes  Barriers to discharge: No       Entered by: Мария Lynn RN 03/21/2023 6:29 AM   Мария Lynn RN   Please review provider order for any additional goals.   Nurse to notify provider when observation goals have been met and patient is ready for discharge.                          "

## 2023-03-21 NOTE — PHARMACY-ADMISSION MEDICATION HISTORY
Pharmacy Note - Admission Medication History    Pertinent Provider Information:   -Patient from [facility], but no med list sent to hospital with patient. List below consistent with clinic records & [facility] pharmacy dispenses, but unable to confirm supplements. Last doses updated with assistance from RN.   ______________________________________________________________________    Prior To Admission (PTA) med list completed and updated in EMR.       PTA Med List   Medication Sig Last Dose     acetaminophen (TYLENOL) 500 MG tablet Take 1,000 mg by mouth 3 times daily as needed for mild pain 3/20/2023 at 1000     albuterol (PROVENTIL HFA;VENTOLIN HFA) 90 mcg/actuation inhaler Inhale 2 puffs into the lungs every 4 hours as needed 3/20/2023     ascorbic acid, vitamin C, (VITAMIN C) 500 MG tablet Take 1,000 mg by mouth daily 3/20/2023     atorvastatin (LIPITOR) 20 MG tablet Take 20 mg by mouth At Bedtime 3/19/2023     beclomethasone HFA (QVAR REDIHALER) 80 MCG/ACT inhaler Inhale 2 puffs into the lungs 2 times daily 3/20/2023     cetirizine (ZYRTEC) 5 MG tablet Take 5 mg by mouth daily 3/20/2023     cyanocobalamin 500 MCG tablet Take 1 tablet by mouth daily 3/20/2023     DULoxetine (CYMBALTA) 60 MG capsule [DULOXETINE (CYMBALTA) 60 MG CAPSULE] Take 60 mg by mouth daily. 3/20/2023     ipratropium-albuterol (COMBIVENT RESPIMAT)  MCG/ACT inhaler Inhale 1 puff into the lungs 4 times daily as needed for shortness of breath / dyspnea or wheezing 3/20/2023     losartan (COZAAR) 100 MG tablet Take 100 mg by mouth daily Unknown     senna-docusate (SENOKOT-S/PERICOLACE) 8.6-50 MG tablet Take 1 tablet by mouth 2 times daily as needed for constipation Unknown     tiotropium (SPIRIVA RESPIMAT) 2.5 MCG/ACT inhaler Inhale 2 puffs into the lungs daily 3/20/2023     traZODone (DESYREL) 50 MG tablet Take 75 mg by mouth At Bedtime 3/19/2023     vitamin D3 (CHOLECALCIFEROL) 50 mcg (2000 units) tablet Take 2 tablets by mouth daily  Unknown       Information source(s): Patient's pharmacy, Clinic records and [facility]    Method of interview communication: N/A    Patient was asked about OTC/herbal products specifically.  PTA med list reflects this.    Based on the pharmacist's assessment, the PTA med list information appears somewhat reliable:due to records unavailable    Medication Affordability:  Not including over the counter (OTC) medications, was there a time in the past 12 months when you did not take your medications as prescribed because of cost?: Unable to Assess    Allergies were reviewed, assessed, and updated with the patient.      Patient did not bring any medications to the hospital and can't retrieve from home. No multi-dose medications are available for use during hospital stay.      Thank you for the opportunity to participate in the care of this patient.      Chaparrita Nicole [facility]     3/20/2023     7:34 PM

## 2023-03-21 NOTE — PROVIDER NOTIFICATION
03/21/23 0000   Tech Time   $Tech Time (10 minute increments) 1   Mode: CPAP/ BiPAP/ AVAPS/ AVAPS AE   CPAP/BiPAP/ AVAPS/ AVAPS AE Mode CPAP   Equipment   Device Auto CPAP   CPAP/BiPAP/Settings   $CPAP/BiPAP Subsequent completed   BIPAP/CPAP On Standby On   Oxygen (%) 21       Cyn Jackson RRT

## 2023-03-21 NOTE — PLAN OF CARE
Goal Outcome Evaluation:    Pt is discharged back to her facility.  Discharge instructions and follow up appointments reviewed with pt.  All personal belonging are packed and sent with.  Pt was in a rush to be discharge as her ride is set up for 1:00 pm with [employer].  Rajput catheter care supplies and leg bag are given to pt.

## 2023-03-21 NOTE — PLAN OF CARE
"PRIMARY DIAGNOSIS: \"GENERIC\" NURSING  OUTPATIENT/OBSERVATION GOALS TO BE MET BEFORE DISCHARGE:  1. ADLs back to baseline: No    2. Activity and level of assistance: Wheelchair bound    3. Pain status: Pain free.    4. Return to near baseline physical activity: No     Discharge Planner Nurse   Safe discharge environment identified: Yes  Barriers to discharge: Yes       Entered by: Judi Moreno RN 03/20/2023 10:07 PM     Please review provider order for any additional goals.   Nurse to notify provider when observation goals have been met and patient is ready for discharge.Goal Outcome Evaluation:                         "

## 2023-03-21 NOTE — CONSULTS
Care Management Initial Consult    General Information  Assessment completed with: Care Team Member, STEF-chart review,    Type of CM/SW Visit: Initial Assessment    Primary Care Provider verified and updated as needed: Yes   Readmission within the last 30 days:           Advance Care Planning:            Communication Assessment  Patient's communication style: spoken language (English or Bilingual)    Hearing Difficulty or Deaf: no        Cognitive  Cognitive/Neuro/Behavioral: WDL                      Living Environment:   People in home: alone (lives in Assisted Living)     Current living Arrangements: assisted living  Name of Facility:  ([facility] ([phone]; fax: [phone]).)   Able to return to prior arrangements: yes       Family/Social Support:  Care provided by:    Provides care for:       Children          Description of Support System: Supportive, Involved         Current Resources:   Patient receiving home care services:       Community Resources:    Equipment currently used at home:    Supplies currently used at home:      Employment/Financial:  Employment Status:          Financial Concerns:             Lifestyle & Psychosocial Needs:  Social Determinants of Health     Tobacco Use: Low Risk      Smoking Tobacco Use: Never     Smokeless Tobacco Use: Never     Passive Exposure: Not on file   Alcohol Use: Not on file   Financial Resource Strain: Not on file   Food Insecurity: Not on file   Transportation Needs: Not on file   Physical Activity: Not on file   Stress: Not on file   Social Connections: Not on file   Intimate Partner Violence: Not on file   Depression: Not on file   Housing Stability: Not on file       Functional Status:  Prior to admission patient needed assistance:              Mental Health Status:          Chemical Dependency Status:                Values/Beliefs:  Spiritual, Cultural Beliefs, Advent Practices, Values that affect care:                 Additional  Information:    Pt from [facility] ([phone]; fax: [phone]). Daughter aRni is primary contact for discharge planning. Patient has used WC transport in the past.       Gloria Ray, ANURADHASW

## 2023-03-21 NOTE — PROGRESS NOTES
Respiratory Care Note    Placed on CPAP at night per provider plan. Patient tolerating well. Will continue to monitor.

## 2023-03-22 ENCOUNTER — PATIENT OUTREACH (OUTPATIENT)
Dept: CARE COORDINATION | Facility: CLINIC | Age: 88
End: 2023-03-22
Payer: COMMERCIAL

## 2023-03-27 ENCOUNTER — LAB REQUISITION (OUTPATIENT)
Dept: LAB | Facility: CLINIC | Age: 88
End: 2023-03-27
Payer: COMMERCIAL

## 2023-03-27 DIAGNOSIS — N13.9 OBSTRUCTIVE AND REFLUX UROPATHY, UNSPECIFIED: ICD-10-CM

## 2023-03-29 LAB
ANION GAP SERPL CALCULATED.3IONS-SCNC: 14 MMOL/L (ref 7–15)
BUN SERPL-MCNC: 28.2 MG/DL (ref 8–23)
CALCIUM SERPL-MCNC: 9.6 MG/DL (ref 8.8–10.2)
CHLORIDE SERPL-SCNC: 96 MMOL/L (ref 98–107)
CREAT SERPL-MCNC: 1.26 MG/DL (ref 0.51–0.95)
DEPRECATED HCO3 PLAS-SCNC: 24 MMOL/L (ref 22–29)
ERYTHROCYTE [DISTWIDTH] IN BLOOD BY AUTOMATED COUNT: NORMAL %
GFR SERPL CREATININE-BSD FRML MDRD: 41 ML/MIN/1.73M2
GLUCOSE SERPL-MCNC: 98 MG/DL (ref 70–99)
HCT VFR BLD AUTO: NORMAL %
HGB BLD-MCNC: NORMAL G/DL
MCH RBC QN AUTO: NORMAL PG
MCHC RBC AUTO-ENTMCNC: NORMAL G/DL
MCV RBC AUTO: NORMAL FL
PLATELET # BLD AUTO: NORMAL 10*3/UL
POTASSIUM SERPL-SCNC: 4.1 MMOL/L (ref 3.4–5.3)
RBC # BLD AUTO: NORMAL 10*6/UL
SODIUM SERPL-SCNC: 134 MMOL/L (ref 136–145)
WBC # BLD AUTO: NORMAL 10*3/UL

## 2023-03-29 PROCEDURE — 80048 BASIC METABOLIC PNL TOTAL CA: CPT | Mod: ORL | Performed by: PHYSICIAN ASSISTANT

## 2023-03-29 PROCEDURE — P9604 ONE-WAY ALLOW PRORATED TRIP: HCPCS | Mod: ORL | Performed by: PHYSICIAN ASSISTANT

## 2023-03-29 PROCEDURE — 36415 COLL VENOUS BLD VENIPUNCTURE: CPT | Mod: ORL | Performed by: PHYSICIAN ASSISTANT

## 2023-04-11 ENCOUNTER — LAB REQUISITION (OUTPATIENT)
Dept: LAB | Facility: CLINIC | Age: 88
End: 2023-04-11
Payer: COMMERCIAL

## 2023-04-11 DIAGNOSIS — D51.3 OTHER DIETARY VITAMIN B12 DEFICIENCY ANEMIA: ICD-10-CM

## 2023-04-12 LAB
DEPRECATED CALCIDIOL+CALCIFEROL SERPL-MC: 82 UG/L (ref 20–75)
MAGNESIUM SERPL-MCNC: 2.3 MG/DL (ref 1.7–2.3)
VIT B12 SERPL-MCNC: 1125 PG/ML (ref 232–1245)

## 2023-04-12 PROCEDURE — 82306 VITAMIN D 25 HYDROXY: CPT | Mod: ORL | Performed by: NURSE PRACTITIONER

## 2023-04-12 PROCEDURE — 83735 ASSAY OF MAGNESIUM: CPT | Mod: ORL | Performed by: NURSE PRACTITIONER

## 2023-04-12 PROCEDURE — P9603 ONE-WAY ALLOW PRORATED MILES: HCPCS | Mod: ORL | Performed by: NURSE PRACTITIONER

## 2023-04-12 PROCEDURE — 36415 COLL VENOUS BLD VENIPUNCTURE: CPT | Mod: ORL | Performed by: NURSE PRACTITIONER

## 2023-04-12 PROCEDURE — 82607 VITAMIN B-12: CPT | Mod: ORL | Performed by: NURSE PRACTITIONER

## 2023-10-11 PROBLEM — K85.90 PANCREATITIS: Status: ACTIVE | Noted: 2021-10-27

## 2023-10-11 PROBLEM — I50.33 ACUTE ON CHRONIC DIASTOLIC CONGESTIVE HEART FAILURE (H): Status: ACTIVE | Noted: 2022-08-31

## 2023-10-11 PROBLEM — M81.0 OSTEOPOROSIS: Status: ACTIVE | Noted: 2021-10-27

## 2023-10-11 PROBLEM — N39.0 RECURRENT UTI: Status: ACTIVE | Noted: 2021-10-27

## 2023-10-11 PROBLEM — J38.3 VOCAL CORD DYSFUNCTION: Status: ACTIVE | Noted: 2019-02-26

## 2023-10-11 PROBLEM — R60.0 LOWER EXTREMITY EDEMA: Status: ACTIVE | Noted: 2022-03-09

## 2023-10-11 PROBLEM — J45.40 MODERATE PERSISTENT ASTHMA WITHOUT COMPLICATION: Status: ACTIVE | Noted: 2020-05-13

## 2023-10-11 PROBLEM — I12.9 BENIGN HYPERTENSIVE CKD: Status: ACTIVE | Noted: 2022-03-23

## 2023-10-11 PROBLEM — I69.354 HEMIPARESIS AFFECTING LEFT SIDE AS LATE EFFECT OF STROKE (H): Status: ACTIVE | Noted: 2021-08-29

## 2023-10-11 PROBLEM — N13.5 URETERAL OBSTRUCTION, LEFT: Status: ACTIVE | Noted: 2021-08-29

## 2023-10-11 PROBLEM — B35.4 RINGWORM OF BODY: Status: ACTIVE | Noted: 2021-10-27

## 2023-10-11 PROBLEM — N10 ACUTE PYELONEPHRITIS: Status: ACTIVE | Noted: 2021-09-03

## 2023-10-11 PROBLEM — K57.31 DIVERTICULAR HEMORRHAGE: Status: ACTIVE | Noted: 2019-05-31

## 2023-10-11 PROBLEM — K57.32 SIGMOID DIVERTICULITIS: Status: ACTIVE | Noted: 2021-08-29

## 2023-10-11 PROBLEM — M89.49 OTHER HYPERTROPHIC OSTEOARTHROPATHY, MULTIPLE SITES: Status: ACTIVE | Noted: 2021-10-27

## 2023-10-11 PROBLEM — R31.29 MICROSCOPIC HEMATURIA: Status: ACTIVE | Noted: 2018-06-07

## 2023-10-11 PROBLEM — D69.2 OTHER NONTHROMBOCYTOPENIC PURPURA (H): Status: ACTIVE | Noted: 2022-03-09

## 2023-10-11 PROBLEM — R35.0 INCREASED FREQUENCY OF URINATION: Status: ACTIVE | Noted: 2022-01-10

## 2023-10-11 PROBLEM — I63.9 RIGHT SIDED CEREBRAL HEMISPHERE CEREBROVASCULAR ACCIDENT (CVA) (H): Status: ACTIVE | Noted: 2021-08-29

## 2023-10-11 PROBLEM — R05.3 CHRONIC COUGH: Status: ACTIVE | Noted: 2018-07-16

## 2023-10-11 PROBLEM — G81.90 HEMIPARESIS (H): Status: ACTIVE | Noted: 2022-03-23

## 2023-10-11 PROBLEM — C53.9 CERVICAL CANCER (H): Status: ACTIVE | Noted: 2022-01-05

## 2023-10-11 PROBLEM — E78.5 HYPERLIPIDEMIA: Status: ACTIVE | Noted: 2021-10-27

## 2023-10-11 PROBLEM — K59.01 SLOW TRANSIT CONSTIPATION: Status: ACTIVE | Noted: 2021-11-24

## 2023-10-11 PROBLEM — D68.69 OTHER THROMBOPHILIA (H): Status: ACTIVE | Noted: 2022-01-05

## 2023-10-11 PROBLEM — E53.8 VITAMIN B12 DEFICIENCY: Status: ACTIVE | Noted: 2021-10-27

## 2023-10-11 PROBLEM — F33.9 MAJOR DEPRESSIVE DISORDER, RECURRENT, UNSPECIFIED (H): Status: ACTIVE | Noted: 2022-03-23

## 2023-10-11 PROBLEM — R09.82 POSTNASAL DRIP: Status: ACTIVE | Noted: 2020-05-13

## 2023-10-11 PROBLEM — N18.31 STAGE 3A CHRONIC KIDNEY DISEASE (H): Status: ACTIVE | Noted: 2022-01-05

## 2023-10-11 PROBLEM — R32 URINARY INCONTINENCE: Status: ACTIVE | Noted: 2022-02-23

## 2023-10-11 PROBLEM — H02.135 SENILE ECTROPION OF LEFT LOWER EYELID: Status: ACTIVE | Noted: 2022-01-03

## 2023-10-11 PROBLEM — K92.2 GI BLEED: Status: ACTIVE | Noted: 2021-10-27

## 2023-10-11 PROBLEM — I20.89 OTHER FORMS OF ANGINA PECTORIS (H): Status: ACTIVE | Noted: 2022-01-05

## 2023-10-11 NOTE — DISCHARGE INSTRUCTIONS
Patient may have 1 or 2 further bloody bowel movements over the course of the next couple of days.  Please have hemoglobin rechecked in 2 days.  Please return to the emergency department if you are concerned for ongoing blood loss.  Follow-up with primary care physician to discuss neck steps in evaluating her bleeding

## 2023-10-11 NOTE — ED PROVIDER NOTES
Emergency Department Encounter     Evaluation Date & Time:   10/11/2023  9:18 AM    CHIEF COMPLAINT:  Rectal Bleeding (Woke up went to the bathroom and had dark red/black stool, mostly diarrhea; never had this before; no pain with palp of abdominal; wheelchair bound due to weakness, came from assisted living [facility]; facility stated pt has been non compliant with meds and refusing to bathe, pt denies this.)      Triage Note:Woke up went to the bathroom and had dark red/black stool, mostly diarrhea; never had this before; no pain with palp of abdominal; wheelchair bound due to weakness, came from assisted living [facility]; facility stated pt has been non compliant with meds and refusing to bathe, pt denies this.     Triage Assessment (Adult)       Row Name 10/11/23 0930          Triage Assessment    Airway WDL WDL        Respiratory WDL    Respiratory WDL WDL        Skin Circulation/Temperature WDL    Skin Circulation/Temperature WDL WDL        Cardiac WDL    Cardiac WDL WDL        Peripheral/Neurovascular WDL    Peripheral Neurovascular WDL WDL        Cognitive/Neuro/Behavioral WDL    Cognitive/Neuro/Behavioral WDL WDL                         FINAL IMPRESSION:    ICD-10-CM    1. Diverticulosis of large intestine with hemorrhage  K57.31       2. Chronic obstructive pulmonary disease, unspecified COPD type (H)  J44.9           Impression and Plan     ED COURSE & MEDICAL DECISION MAKIN:56 AM I met with the patient to gather history and to perform my initial exam. I discussed the plan for care while in the Emergency Department. Appropriate PPE was worn during patient encounters.    ED Course as of 10/11/23 1455   Wed Oct 11, 2023   1007 Patient is not a good historian.  According to EMS reports she had a single dark red bloody bowel movement this morning.  Per the home there was no history of this prior to.  However, she is not having any abdominal pain so I doubt that this is ischemic  colitis, certainly could still be something like diverticulosis or an AVM or amyloid bleed.  Consider possible bleeding mass as well but given her age of 89 years colonoscopy is generally not recommended unless it is needed to control the bleeding.  I did review her chart and did find that in August 2022 she was admitted for what was found to be a hemorrhoidal bleed that sounds very similar today.  She had a slight drop in her hemoglobin but it was not severe.  At this time she has had no further bowel movement since here but if she does we will certainly collect that to review.  She has no rectal pain to suggest a fissure so there is no obvious benefit to doing rectal examination at this time.  We will start some gentle fluids and check blood levels as well as CT scan to rule out other obvious cause of bleeding or ongoing blush to suggest ongoing bleeding.  While waiting will give proton pump inhibitor though I doubt that this is a rapid transit upper GI bleed which would have to be to have blood passing per rectum since she is not having abdominal cramping with it.   1116 Her GFR is a bit low and her lactic acid is not elevated making ischemic colitis much less likely thought so I think although it may not show a blush of ongoing bleeding we can start with just unenhanced CT scan of the abdomen pelvis.   1117 Her kidney function appears to be at baseline.  Her hemoglobin is not decreasing compared to previous levels done within the last couple of months.     1404 CAT scan shows diverticulosis without acute inflammation seen.  Certainly diverticulosis and or hemorrhoids could be a source of her bleeding.  No further episodes of bloody bowel movements while here.  Again, given her age colonoscopy is not routinely done if her bleeding resolves.  Could continue to monitor as an outpatient if hemoglobin is adequately stable.  Chest x-ray is unremarkable for acute finding and with her history of COPD her cough is noted  to be chronic.   1443 Hgb adequate with iv hydration.  No further bm since here.  Discharged to care center.   1449 I did attempt to call her daughter but there is no answer so I did get ahold of her son in law.  Updated on diverticulosis.  He did note recently the cough is worse so we discussed an empiric course of antibiotics.       At the conclusion of the encounter I discussed the results of all the tests and the disposition. The questions were answered. The patient or family acknowledged understanding and was agreeable with the care plan.          0 minutes of critical care time        MEDICATIONS GIVEN IN THE EMERGENCY DEPARTMENT:  Medications   sodium chloride 0.9 % infusion (0 mLs Intravenous Stopped 10/11/23 1450)   pantoprazole (PROTONIX) IV push injection 40 mg (40 mg Intravenous $Given 10/11/23 1135)       NEW PRESCRIPTIONS STARTED AT TODAY'S ED VISIT:  New Prescriptions    AMOXICILLIN-CLAVULANATE (AUGMENTIN) 875-125 MG TABLET    Take 1 tablet by mouth 2 times daily for 10 days       HPI     HPI     Bessy Sevilla is a 89 year old female with a pertinent history of GI bleed, anemia, cervical cancer, asthma, COPD, atrial fibrillation, CHF, CKD stage III, hyperlipidemia, and hypertension who presents to this ED via EMS for evaluation of bloody stool.    Per chart review, patient was seen on 8/14/2022 (~1 year ago) at [facility] for a GI bleed. Patient presented with bloody stool. Hemoglobin dropped from 10.6 to 9.2 after IVF. CT with no evidence of a GI bleed. EGD and colonoscopy negative for acute bleeding. Patient discharged in stable condition.    Per EMS, patient comes from [facility] assisted living [facility]. Patient has been noncompliant with medications and is refusing to bathe. Had a dark red and black stool that was diarrhea prior to arrival which she has never had before. Uses a wheelchair at baseline.    Per patient, she had a bloody stool this morning and was sent to the ED for  evaluation. She states that she lives alone. Patient coughing during examination.    REVIEW OF SYSTEMS:  Review of Systems   Respiratory:  Positive for cough.    Gastrointestinal:  Positive for blood in stool (red/black) and diarrhea.     remainder of systems are all otherwise negative.        Medical History     Past Medical History:   Diagnosis Date    Acute cystitis     Acute kidney injury (H24)     Anemia     Asthma     Bradycardia     Bronchiectasis (H)     Cervical cancer (H)     CKD (chronic kidney disease) stage 3, GFR 30-59 ml/min (H)     COPD (chronic obstructive pulmonary disease) (H)     DVT (deep venous thrombosis) (H)     Dysuria     Rajput catheter in place     GIB (gastrointestinal bleeding)     Glaucoma     HLD (hyperlipidemia)     HTN (hypertension)     Hydronephrosis     Idiopathic acute pancreatitis     Insomnia     Kidney stone     Mild cognitive impairment     MRSA (methicillin resistant staphylococcus aureus) pneumonia (H)     LANRE (obstructive sleep apnea)     Osteoarthritis     Paroxysmal atrial fibrillation (H)     Pulmonary hypertension (H)     PVC's (premature ventricular contractions)     Spinal stenosis in cervical region     Spinal stenosis, lumbar     Systolic heart failure (H)     Transfusion history     Vitamin D deficiency        Past Surgical History:   Procedure Laterality Date    CATARACT EXTRACTION      COLONOSCOPY N/A 08/17/2022    Procedure: COLONOSCOPY;  Surgeon: Dallas Rahman MD;  Location: [facility] OR    COMBINED CYSTOSCOPY, RETROGRADES, EXCHANGE STENT URETER(S) Left 08/14/2022    Procedure: CYSTOSCOPY, WITH RETROGRADE PYELOGRAM AND URETERAL STENT REPLACEMENT;  Surgeon: Hilario Viveros MD;  Location: [facility] OR    COMBINED CYSTOSCOPY, RETROGRADES, EXCHANGE STENT URETER(S) Left 3/20/2023    Procedure: CYSTOSCOPY, LEFT RETROGRADE PYELOGRAM, LEFT URETERAL STENT EXCHANGE;  Surgeon: Hilario Viveros MD;  Location: [facility] OR    ESOPHAGOSCOPY,  GASTROSCOPY, DUODENOSCOPY (EGD), COMBINED N/A 08/17/2022    Procedure: ESOPHAGOGASTRODUODENOSCOPY;  Surgeon: Dallas Rahman MD;  Location: [facility] [place]    IR ABSCESS DRAIN INSERTION  07/30/2016    LAPAROSCOPIC CHOLECYSTECTOMY N/A 07/18/2016    Procedure: CHOLECYSTECTOMY LAPAROSCOPIC;  Surgeon: Brianna Cast MD;  Location: [facility];  Service:     ROTATOR CUFF REPAIR RT/LT  2010    SPINAL FUSION         History reviewed. No pertinent family history.    Social History     Tobacco Use    Smoking status: Never    Smokeless tobacco: Never   Substance Use Topics    Alcohol use: No    Drug use: No       amoxicillin-clavulanate (AUGMENTIN) 875-125 MG tablet  acetaminophen (TYLENOL) 500 MG tablet  albuterol (PROVENTIL HFA;VENTOLIN HFA) 90 mcg/actuation inhaler  ascorbic acid, vitamin C, (VITAMIN C) 500 MG tablet  atorvastatin (LIPITOR) 20 MG tablet  beclomethasone HFA (QVAR REDIHALER) 80 MCG/ACT inhaler  cetirizine (ZYRTEC) 5 MG tablet  cyanocobalamin 500 MCG tablet  DULoxetine (CYMBALTA) 60 MG capsule  ipratropium-albuterol (COMBIVENT RESPIMAT)  MCG/ACT inhaler  losartan (COZAAR) 100 MG tablet  senna-docusate (SENOKOT-S/PERICOLACE) 8.6-50 MG tablet  tiotropium (SPIRIVA RESPIMAT) 2.5 MCG/ACT inhaler  traZODone (DESYREL) 50 MG tablet  vitamin D3 (CHOLECALCIFEROL) 50 mcg (2000 units) tablet        Physical Exam     First Vitals:  Patient Vitals for the past 24 hrs:   BP Temp Temp src Pulse Resp SpO2 Height Weight   10/11/23 1300 134/58 -- -- 82 23 95 % -- --   10/11/23 1200 (!) 143/65 -- -- 78 11 95 % -- --   10/11/23 1100 122/81 -- -- 78 -- 97 % -- --   10/11/23 0930 128/65 -- -- 84 -- 94 % -- --   10/11/23 0923 112/73 97.5  F (36.4  C) Oral 80 16 97 % 1.524 m (5') 97.5 kg (215 lb)       PHYSICAL EXAM:   Constitutional:   Sitting up in bed, easily conversive  HENT:  Normocephalic, posterior pharynx wnl, mucous membranes dry and moderately pink   Eyes:  PERRL, EOMI, Conjunctiva normal, No  discharge, no scleral icterus.  Respiratory:  Breathing easily, clear to auscultation  Cardiovascular:  Regular rate and rhythm, nl s1s2 0 murmurs, rubs, or gallops.  Peripheral pulses dp, pt, and radial are wnl.  No peripheral edema   GI:  Bowel sounds normal, Soft, No tenderness, No flank tenderness, nondistended.  :No CVA tenderness. Leg bag with dark yellow urine  Musculoskeletal:  Moves all extremities.  No erythematous or swollen major joints,   Integument:  Normal color, dry  Lymphatic:  No cervical lymphadenopathy  Neurologic:  Alert & oriented x 3, Normal motor function, Normal sensory function, No focal deficits noted. Normal speech.  Psychiatric:  Affect normal, Judgment normal, Mood normal.     Results     LAB AND RADIOLOGY:  All pertinent labs reviewed and interpreted  Results for orders placed or performed during the hospital encounter of 10/11/23   CT Abdomen Pelvis w/o Contrast     Status: None    Narrative    EXAM: CT ABDOMEN PELVIS W/O CONTRAST  LOCATION: [facility]  DATE: 10/11/2023    INDICATION: bloody bm. 8 2022 had a visit for hemorrhoidal bleeding. low gfr  COMPARISON: CT angiogram of the abdomen and pelvis 08/15/2022  TECHNIQUE: CT scan of the abdomen and pelvis was performed without IV contrast. Multiplanar reformats were obtained. Dose reduction techniques were used.  CONTRAST: None.    FINDINGS:   LOWER CHEST: Dependent subpleural atelectasis in the posterior lower lobes, left greater than right. Degenerative calcification of the aortic and mitral annuli and aortic valve leaflets. Moderate atheromatous coronary calcifications.    HEPATOBILIARY: Prior cholecystectomy. No bile duct enlargement. Homogeneous liver attenuation. No liver lesions.    PANCREAS: Normal.    SPLEEN: Normal.    ADRENAL GLANDS: Normal.    KIDNEYS/BLADDER: Appropriately positioned left nephroureteral stent. Unchanged 9 cm cortical cyst arising from the posterior left kidney does not require  specific workup or follow-up. No urinary tract calculi or obstruction. Urinary bladder decompressed   by indwelling Rajput catheter.    BOWEL: Diverticulosis of the colon. No acute inflammatory change. No obstruction.     LYMPH NODES: Normal.    VASCULATURE: Diffuse moderate aortoiliac atheromatous calcification but no aneurysm.    PELVIC ORGANS: Streak artifact from hip replacements obscures the low pelvis. Age concordant uterine atrophy with small myometrial calcifications. No pelvic mass or free fluid.    MUSCULOSKELETAL: Posterior fusion with pedicle screws and paraspinous rods from L3 to L5. Solid ankylosis of the posterior elements at these levels. Slight retrolisthesis of L2 on L3 and L1 on L2. Mild anterior wedging of L1, L2 and T10. Bilateral hip   joint replacements. No periprosthetic fractures. Symmetric osteoarthrosis of both SI joints with subchondral sclerosis along the right SI joint.      Impression    IMPRESSION:     1.  Diverticulosis of the colon particularly of the sigmoid colon but no findings to suggest acute diverticulitis or mechanical obstruction.  2.  Appropriately positioned left nephroureteral stent. No hydronephrosis.       XR Chest 1 View     Status: None    Narrative    EXAM: XR CHEST 1 VIEW  LOCATION: [facility]  DATE: 10/11/2023    INDICATION: cough  COMPARISON: CTA chest and chest x-ray 04/22/2022      Impression    IMPRESSION: Lungs are clear. Heart is enlarged but unchanged. No signs of pneumonia or failure. Portion of the lumbar spinal fusion apparatus is visible.   Basic metabolic panel     Status: Abnormal   Result Value Ref Range    Sodium 134 (L) 135 - 145 mmol/L    Potassium 4.6 3.4 - 5.3 mmol/L    Chloride 94 (L) 98 - 107 mmol/L    Carbon Dioxide (CO2) 30 (H) 22 - 29 mmol/L    Anion Gap 10 7 - 15 mmol/L    Urea Nitrogen 35.4 (H) 8.0 - 23.0 mg/dL    Creatinine 1.56 (H) 0.51 - 0.95 mg/dL    GFR Estimate 31 (L) >60 mL/min/1.73m2    Calcium 9.1 8.8 -  10.2 mg/dL    Glucose 102 (H) 70 - 99 mg/dL   INR     Status: Normal   Result Value Ref Range    INR 0.97 0.85 - 1.15   Lactic acid whole blood     Status: Normal   Result Value Ref Range    Lactic Acid 1.9 0.7 - 2.0 mmol/L   CBC with platelets and differential     Status: Abnormal   Result Value Ref Range    WBC Count 12.0 (H) 4.0 - 11.0 10e3/uL    RBC Count 4.41 3.80 - 5.20 10e6/uL    Hemoglobin 11.9 11.7 - 15.7 g/dL    Hematocrit 37.5 35.0 - 47.0 %    MCV 85 78 - 100 fL    MCH 27.0 26.5 - 33.0 pg    MCHC 31.7 31.5 - 36.5 g/dL    RDW 15.0 10.0 - 15.0 %    Platelet Count 297 150 - 450 10e3/uL    % Neutrophils 76 %    % Lymphocytes 9 %    % Monocytes 11 %    Mids % (Monos, Eos, Basos)      % Eosinophils 2 %    % Basophils 0 %    % Immature Granulocytes 2 %    NRBCs per 100 WBC 0 <1 /100    Absolute Neutrophils 9.2 (H) 1.6 - 8.3 10e3/uL    Absolute Lymphocytes 1.0 0.8 - 5.3 10e3/uL    Absolute Monocytes 1.3 0.0 - 1.3 10e3/uL    Mids Abs (Monos, Eos, Basos)      Absolute Eosinophils 0.2 0.0 - 0.7 10e3/uL    Absolute Basophils 0.1 0.0 - 0.2 10e3/uL    Absolute Immature Granulocytes 0.3 <=0.4 10e3/uL    Absolute NRBCs 0.0 10e3/uL   Hemoglobin     Status: Abnormal   Result Value Ref Range    Hemoglobin 10.4 (L) 11.7 - 15.7 g/dL   Adult Type and Screen     Status: None   Result Value Ref Range    ABO/RH(D) B POS     Antibody Screen Negative Negative    SPECIMEN EXPIRATION DATE [id number]    ABO/Rh type and screen     Status: None    Narrative    The following orders were created for panel order ABO/Rh type and screen.  Procedure                               Abnormality         Status                     ---------                               -----------         ------                     Adult Type and Screen[[id number]]                            Final result                 Please view results for these tests on the individual orders.   CBC with platelets differential     Status: Abnormal    Narrative    The  following orders were created for panel order CBC with platelets differential.  Procedure                               Abnormality         Status                     ---------                               -----------         ------                     CBC with platelets and d...[[id number]]  Abnormal            Final result                 Please view results for these tests on the individual orders.               [facility] Documentation     Medical Decision Making    History:  Supplemental history from: EMS  External Record(s) reviewed: Inpatient Record: 8/14/2022 (See HPI)    Work Up:  Chart documentation includes differential considered and any EKGs or imaging independently interpreted by provider, where specified.  In additional to work up documented, I considered the following work up: Documented in chart, if applicable.    External consultation:  Discussion of management with another provider: Documented in chart, if applicable    Complicating factors:  Care impacted by chronic illness: Anticoagulated State, Cancer/Chemotherapy, Chronic Kidney Disease, Chronic Lung Disease, Heart Disease, Hyperlipidemia, and Hypertension  Care affected by social determinants of health: Problems Related to Primary Support Group    Disposition considerations: Discharge. I recommended the patient continue their current prescription strength medication(s):  . See documentation for any additional details.         The creation of this record is based on the scribe s observations of the work being performed by Sonia Aldridge and the provider s statements to them. This document has been checked and approved by MD Inga Romeo MD  Emergency Medicine  [facility] EMERGENCY DEPARTMENT         Inga Adam MD  10/11/23 7349

## 2023-10-11 NOTE — ED NOTES
Bed: [facility]-  Expected date:   Expected time:   Means of arrival:   Comments:  MPWD- Blood in stool

## 2023-10-11 NOTE — ED TRIAGE NOTES
Woke up went to the bathroom and had dark red/black stool, mostly diarrhea; never had this before; no pain with palp of abdominal; wheelchair bound due to weakness, came from assisted living [facility]; facility stated pt has been non compliant with meds and refusing to bathe, pt denies this.     Triage Assessment (Adult)       Row Name 10/11/23 8118          Triage Assessment    Airway WDL WDL        Respiratory WDL    Respiratory WDL WDL        Skin Circulation/Temperature WDL    Skin Circulation/Temperature WDL WDL        Cardiac WDL    Cardiac WDL WDL        Peripheral/Neurovascular WDL    Peripheral Neurovascular WDL WDL        Cognitive/Neuro/Behavioral WDL    Cognitive/Neuro/Behavioral WDL WDL

## 2024-01-01 ENCOUNTER — LAB REQUISITION (OUTPATIENT)
Dept: LAB | Facility: HOSPITAL | Age: 89
End: 2024-01-01
Payer: COMMERCIAL

## 2024-01-01 ENCOUNTER — LAB REQUISITION (OUTPATIENT)
Dept: LAB | Facility: CLINIC | Age: 89
End: 2024-01-01
Payer: COMMERCIAL

## 2024-01-01 DIAGNOSIS — R30.0 DYSURIA: ICD-10-CM

## 2024-01-01 DIAGNOSIS — K92.2 GASTROINTESTINAL HEMORRHAGE, UNSPECIFIED: ICD-10-CM

## 2024-01-01 DIAGNOSIS — F05 DELIRIUM DUE TO KNOWN PHYSIOLOGICAL CONDITION: ICD-10-CM

## 2024-01-01 LAB
ALBUMIN UR-MCNC: 10 MG/DL
ANION GAP SERPL CALCULATED.3IONS-SCNC: 9 MMOL/L (ref 7–15)
APPEARANCE UR: ABNORMAL
BACTERIA #/AREA URNS HPF: ABNORMAL /HPF
BACTERIA UR CULT: ABNORMAL
BILIRUB UR QL STRIP: NEGATIVE
BUN SERPL-MCNC: 31.2 MG/DL (ref 8–23)
CALCIUM SERPL-MCNC: 9 MG/DL (ref 8.8–10.2)
CHLORIDE SERPL-SCNC: 100 MMOL/L (ref 98–107)
COLOR UR AUTO: ABNORMAL
CREAT SERPL-MCNC: 1.66 MG/DL (ref 0.51–0.95)
DEPRECATED HCO3 PLAS-SCNC: 27 MMOL/L (ref 22–29)
EGFRCR SERPLBLD CKD-EPI 2021: 29 ML/MIN/1.73M2
ERYTHROCYTE [DISTWIDTH] IN BLOOD BY AUTOMATED COUNT: 14.7 % (ref 10–15)
GLUCOSE SERPL-MCNC: 89 MG/DL (ref 70–99)
GLUCOSE UR STRIP-MCNC: NEGATIVE MG/DL
HCT VFR BLD AUTO: 31.3 % (ref 35–47)
HEMOCCULT STL QL: NEGATIVE
HGB BLD-MCNC: 10.1 G/DL (ref 11.7–15.7)
HGB UR QL STRIP: NEGATIVE
HYALINE CASTS: 3 /LPF
KETONES UR STRIP-MCNC: NEGATIVE MG/DL
LEUKOCYTE ESTERASE UR QL STRIP: ABNORMAL
MCH RBC QN AUTO: 28.9 PG (ref 26.5–33)
MCHC RBC AUTO-ENTMCNC: 32.3 G/DL (ref 31.5–36.5)
MCV RBC AUTO: 89 FL (ref 78–100)
MUCOUS THREADS #/AREA URNS LPF: PRESENT /LPF
NITRATE UR QL: NEGATIVE
PH UR STRIP: 6.5 [PH] (ref 5–7)
PLATELET # BLD AUTO: 194 10E3/UL (ref 150–450)
POTASSIUM SERPL-SCNC: 4.2 MMOL/L (ref 3.4–5.3)
RBC # BLD AUTO: 3.5 10E6/UL (ref 3.8–5.2)
RBC URINE: 4 /HPF
SODIUM SERPL-SCNC: 136 MMOL/L (ref 135–145)
SP GR UR STRIP: 1.01 (ref 1–1.03)
SQUAMOUS EPITHELIAL: 4 /HPF
UROBILINOGEN UR STRIP-MCNC: <2 MG/DL
WBC # BLD AUTO: 6.7 10E3/UL (ref 4–11)
WBC CLUMPS #/AREA URNS HPF: PRESENT /HPF
WBC URINE: 117 /HPF

## 2024-01-01 PROCEDURE — 85027 COMPLETE CBC AUTOMATED: CPT | Mod: ORL | Performed by: NURSE PRACTITIONER

## 2024-01-01 PROCEDURE — 36415 COLL VENOUS BLD VENIPUNCTURE: CPT | Mod: ORL | Performed by: NURSE PRACTITIONER

## 2024-01-01 PROCEDURE — 87086 URINE CULTURE/COLONY COUNT: CPT | Mod: ORL

## 2024-01-01 PROCEDURE — P9603 ONE-WAY ALLOW PRORATED MILES: HCPCS | Mod: ORL | Performed by: NURSE PRACTITIONER

## 2024-01-01 PROCEDURE — 82272 OCCULT BLD FECES 1-3 TESTS: CPT | Mod: ORL | Performed by: NURSE PRACTITIONER

## 2024-01-01 PROCEDURE — 80048 BASIC METABOLIC PNL TOTAL CA: CPT | Mod: ORL | Performed by: NURSE PRACTITIONER

## 2024-01-01 PROCEDURE — 81001 URINALYSIS AUTO W/SCOPE: CPT | Mod: ORL

## (undated) DEVICE — GLOVE BIOGEL PI ORTHOPRO SZ 7.5 47675

## (undated) DEVICE — GUIDEWIRE BENTSON FLEX TIP 0.035"X150CM M0066201250

## (undated) DEVICE — CUSTOM PACK CYSTO PREFERRED SOT5BCYHEA

## (undated) DEVICE — CATH FOLEY 5CC 16FR SIL/LTX 0165V16S

## (undated) DEVICE — DRAPE SHEET REV FOLD 3/4 9349

## (undated) DEVICE — MAT FLOOR SURGICAL 40X38 0702140238

## (undated) DEVICE — SOL WATER IRRIG 1000ML BOTTLE 2F7114

## (undated) DEVICE — PREP DYNA-HEX 4% CHG SCRUB 4OZ BOTTLE MDS098710

## (undated) DEVICE — SUCTION MANIFOLD NEPTUNE 2 SYS 1 PORT 702-025-000

## (undated) DEVICE — LEGGINGS 31X48" LF KM89408

## (undated) DEVICE — TUBING SET THERMEDX UROLOGY SGL USE LL0006

## (undated) DEVICE — CATH URETERAL OPEN END 5FRX70CM M0064002010

## (undated) DEVICE — TUBING SUCTION MEDI-VAC 1/4"X20' N620A - HE

## (undated) DEVICE — BAG URINARY DRAIN 2000ML LF 154002

## (undated) DEVICE — SOL WATER IRRIG 3000ML BAG 2B7117

## (undated) RX ORDER — ONDANSETRON 2 MG/ML
INJECTION INTRAMUSCULAR; INTRAVENOUS
Status: DISPENSED
Start: 2023-03-13

## (undated) RX ORDER — PROPOFOL 10 MG/ML
INJECTION, EMULSION INTRAVENOUS
Status: DISPENSED
Start: 2023-03-20

## (undated) RX ORDER — ONDANSETRON 2 MG/ML
INJECTION INTRAMUSCULAR; INTRAVENOUS
Status: DISPENSED
Start: 2023-03-20

## (undated) RX ORDER — DEXAMETHASONE SODIUM PHOSPHATE 10 MG/ML
INJECTION, SOLUTION INTRAMUSCULAR; INTRAVENOUS
Status: DISPENSED
Start: 2023-03-13

## (undated) RX ORDER — PROPOFOL 10 MG/ML
INJECTION, EMULSION INTRAVENOUS
Status: DISPENSED
Start: 2022-08-17

## (undated) RX ORDER — FENTANYL CITRATE 50 UG/ML
INJECTION, SOLUTION INTRAMUSCULAR; INTRAVENOUS
Status: DISPENSED
Start: 2023-03-20

## (undated) RX ORDER — ONDANSETRON 2 MG/ML
INJECTION INTRAMUSCULAR; INTRAVENOUS
Status: DISPENSED
Start: 2022-08-17

## (undated) RX ORDER — DEXAMETHASONE SODIUM PHOSPHATE 10 MG/ML
INJECTION, SOLUTION INTRAMUSCULAR; INTRAVENOUS
Status: DISPENSED
Start: 2023-03-20

## (undated) RX ORDER — FENTANYL CITRATE 50 UG/ML
INJECTION, SOLUTION INTRAMUSCULAR; INTRAVENOUS
Status: DISPENSED
Start: 2023-03-13

## (undated) RX ORDER — DEXAMETHASONE SODIUM PHOSPHATE 10 MG/ML
INJECTION INTRAMUSCULAR; INTRAVENOUS
Status: DISPENSED
Start: 2022-08-17

## (undated) RX ORDER — EPHEDRINE SULFATE 50 MG/ML
INJECTION, SOLUTION INTRAMUSCULAR; INTRAVENOUS; SUBCUTANEOUS
Status: DISPENSED
Start: 2023-03-20

## (undated) RX ORDER — PROPOFOL 10 MG/ML
INJECTION, EMULSION INTRAVENOUS
Status: DISPENSED
Start: 2022-08-14

## (undated) RX ORDER — PROPOFOL 10 MG/ML
INJECTION, EMULSION INTRAVENOUS
Status: DISPENSED
Start: 2023-03-13